# Patient Record
Sex: MALE | Race: WHITE | Employment: FULL TIME | URBAN - METROPOLITAN AREA
[De-identification: names, ages, dates, MRNs, and addresses within clinical notes are randomized per-mention and may not be internally consistent; named-entity substitution may affect disease eponyms.]

---

## 2018-03-30 ENCOUNTER — E-VISIT (OUTPATIENT)
Dept: FAMILY MEDICINE CLINIC | Age: 48
End: 2018-03-30

## 2018-03-30 DIAGNOSIS — J11.1 INFLUENZA: Primary | ICD-10-CM

## 2018-03-30 PROCEDURE — 99444 PR PHYSICIAN ONLINE EVALUATION & MANAGEMENT SERVICE: CPT | Performed by: FAMILY MEDICINE

## 2018-03-30 RX ORDER — OSELTAMIVIR PHOSPHATE 75 MG/1
75 CAPSULE ORAL 2 TIMES DAILY
Qty: 10 CAPSULE | Refills: 0 | Status: SHIPPED | OUTPATIENT
Start: 2018-03-30 | End: 2018-04-04

## 2018-03-30 ASSESSMENT — LIFESTYLE VARIABLES: SMOKING_STATUS: NO

## 2018-04-01 ENCOUNTER — HOSPITAL ENCOUNTER (EMERGENCY)
Age: 48
Discharge: HOME | End: 2018-04-01
Attending: EMERGENCY MEDICINE
Payer: COMMERCIAL

## 2018-04-01 VITALS
TEMPERATURE: 99.3 F | HEIGHT: 76 IN | OXYGEN SATURATION: 99 % | DIASTOLIC BLOOD PRESSURE: 83 MMHG | SYSTOLIC BLOOD PRESSURE: 135 MMHG | WEIGHT: 315 LBS | HEART RATE: 76 BPM | BODY MASS INDEX: 38.36 KG/M2 | RESPIRATION RATE: 16 BRPM

## 2018-04-01 DIAGNOSIS — L03.116 CELLULITIS OF LEFT LOWER EXTREMITY: Primary | ICD-10-CM

## 2018-04-01 PROCEDURE — 99282 EMERGENCY DEPT VISIT SF MDM: CPT

## 2018-04-01 RX ORDER — DOXYCYCLINE HYCLATE 100 MG
100 TABLET ORAL 2 TIMES DAILY
Qty: 20 TABLET | Refills: 0 | Status: SHIPPED | OUTPATIENT
Start: 2018-04-01 | End: 2019-08-30 | Stop reason: SDUPTHER

## 2018-04-01 RX ORDER — CLINDAMYCIN HYDROCHLORIDE 150 MG/1
CAPSULE ORAL
Qty: 80 CAPSULE | Refills: 0 | Status: SHIPPED | OUTPATIENT
Start: 2018-04-01 | End: 2019-08-30 | Stop reason: SDUPTHER

## 2018-04-01 ASSESSMENT — PAIN SCALES - WONG BAKER: WONGBAKER_NUMERICALRESPONSE: 4

## 2018-04-01 ASSESSMENT — PAIN DESCRIPTION - PAIN TYPE: TYPE: ACUTE PAIN

## 2018-04-01 ASSESSMENT — PAIN SCALES - GENERAL: PAINLEVEL_OUTOF10: 4

## 2018-04-01 ASSESSMENT — PAIN DESCRIPTION - DESCRIPTORS: DESCRIPTORS: ACHING

## 2018-04-01 ASSESSMENT — PAIN DESCRIPTION - ORIENTATION: ORIENTATION: LEFT

## 2018-04-02 ENCOUNTER — OFFICE VISIT (OUTPATIENT)
Dept: FAMILY MEDICINE CLINIC | Age: 48
End: 2018-04-02
Payer: COMMERCIAL

## 2018-04-02 VITALS — BODY MASS INDEX: 41.39 KG/M2 | WEIGHT: 315 LBS

## 2018-04-02 DIAGNOSIS — L03.116 CELLULITIS OF LEFT LOWER EXTREMITY: ICD-10-CM

## 2018-04-02 DIAGNOSIS — K12.0 APHTHOUS ULCER: Primary | ICD-10-CM

## 2018-04-02 PROCEDURE — 99213 OFFICE O/P EST LOW 20 MIN: CPT | Performed by: FAMILY MEDICINE

## 2018-04-02 RX ORDER — FLUCONAZOLE 100 MG/1
100 TABLET ORAL DAILY
Qty: 7 TABLET | Refills: 0 | Status: SHIPPED | OUTPATIENT
Start: 2018-04-02 | End: 2018-04-09

## 2018-04-02 RX ORDER — CHLORHEXIDINE GLUCONATE 0.12 MG/ML
RINSE ORAL
Qty: 630 ML | Refills: 0 | Status: SHIPPED | OUTPATIENT
Start: 2018-04-02 | End: 2019-01-10 | Stop reason: ALTCHOICE

## 2018-04-02 ASSESSMENT — PATIENT HEALTH QUESTIONNAIRE - PHQ9
2. FEELING DOWN, DEPRESSED OR HOPELESS: 0
SUM OF ALL RESPONSES TO PHQ9 QUESTIONS 1 & 2: 0
SUM OF ALL RESPONSES TO PHQ QUESTIONS 1-9: 0
1. LITTLE INTEREST OR PLEASURE IN DOING THINGS: 0

## 2018-04-03 RX ORDER — VALACYCLOVIR HYDROCHLORIDE 1 G/1
TABLET, FILM COATED ORAL
Qty: 4 TABLET | Refills: 5 | Status: SHIPPED | OUTPATIENT
Start: 2018-04-03 | End: 2019-08-30 | Stop reason: SDUPTHER

## 2018-04-03 ASSESSMENT — ENCOUNTER SYMPTOMS
STRIDOR: 0
CHOKING: 0
BACK PAIN: 0
RHINORRHEA: 0
DIARRHEA: 0
CHEST TIGHTNESS: 0
SINUS PRESSURE: 0
APNEA: 0
VOICE CHANGE: 0
CONSTIPATION: 0
COUGH: 0
WHEEZING: 0
EYE PAIN: 0
RECTAL PAIN: 0
COLOR CHANGE: 0
NAUSEA: 0
ABDOMINAL PAIN: 0
SHORTNESS OF BREATH: 0
FACIAL SWELLING: 0
VOMITING: 0
EYE REDNESS: 0
PHOTOPHOBIA: 0
EYE ITCHING: 0
BLOOD IN STOOL: 0
ANAL BLEEDING: 0
SORE THROAT: 0
TROUBLE SWALLOWING: 0
ABDOMINAL DISTENTION: 0
EYE DISCHARGE: 0

## 2018-04-03 ASSESSMENT — PATIENT HEALTH QUESTIONNAIRE - PHQ9
SUM OF ALL RESPONSES TO PHQ QUESTIONS 1-9: 0
SUM OF ALL RESPONSES TO PHQ QUESTIONS 1-9: 0
SUM OF ALL RESPONSES TO PHQ9 QUESTIONS 1 & 2: 0
2. FEELING DOWN, DEPRESSED OR HOPELESS: 0
1. LITTLE INTEREST OR PLEASURE IN DOING THINGS: 0
2. FEELING DOWN, DEPRESSED OR HOPELESS: 0
SUM OF ALL RESPONSES TO PHQ9 QUESTIONS 1 & 2: 0
1. LITTLE INTEREST OR PLEASURE IN DOING THINGS: 0

## 2018-04-17 ENCOUNTER — OFFICE VISIT (OUTPATIENT)
Dept: FAMILY MEDICINE CLINIC | Age: 48
End: 2018-04-17
Payer: COMMERCIAL

## 2018-04-17 VITALS — SYSTOLIC BLOOD PRESSURE: 128 MMHG | DIASTOLIC BLOOD PRESSURE: 80 MMHG | WEIGHT: 315 LBS | BODY MASS INDEX: 41.4 KG/M2

## 2018-04-17 DIAGNOSIS — F98.8 ATTENTION DEFICIT DISORDER, UNSPECIFIED HYPERACTIVITY PRESENCE: ICD-10-CM

## 2018-04-17 DIAGNOSIS — M51.16 LUMBAR DISC DISEASE WITH RADICULOPATHY: ICD-10-CM

## 2018-04-17 PROCEDURE — 99213 OFFICE O/P EST LOW 20 MIN: CPT | Performed by: FAMILY MEDICINE

## 2018-04-17 RX ORDER — GABAPENTIN 600 MG/1
600 TABLET ORAL 3 TIMES DAILY
Qty: 180 TABLET | Refills: 3 | Status: SHIPPED | OUTPATIENT
Start: 2018-04-17 | End: 2019-03-28 | Stop reason: SDUPTHER

## 2018-04-17 ASSESSMENT — ENCOUNTER SYMPTOMS
CONSTIPATION: 0
EYE ITCHING: 0
ANAL BLEEDING: 0
SORE THROAT: 0
EYE DISCHARGE: 0
EYE PAIN: 0
PHOTOPHOBIA: 0
APNEA: 0
CHOKING: 0
EYE REDNESS: 0
STRIDOR: 0
WHEEZING: 0
CHEST TIGHTNESS: 0
ABDOMINAL DISTENTION: 0
VOMITING: 0
ABDOMINAL PAIN: 0
VOICE CHANGE: 0
BLOOD IN STOOL: 0
RECTAL PAIN: 0
TROUBLE SWALLOWING: 0
FACIAL SWELLING: 0
BACK PAIN: 0
COUGH: 0
NAUSEA: 0
COLOR CHANGE: 0
SHORTNESS OF BREATH: 0

## 2018-04-17 ASSESSMENT — PATIENT HEALTH QUESTIONNAIRE - PHQ9
2. FEELING DOWN, DEPRESSED OR HOPELESS: 0
1. LITTLE INTEREST OR PLEASURE IN DOING THINGS: 0
SUM OF ALL RESPONSES TO PHQ QUESTIONS 1-9: 0
SUM OF ALL RESPONSES TO PHQ9 QUESTIONS 1 & 2: 0

## 2018-05-10 DIAGNOSIS — I87.2 STASIS DERMATITIS OF BOTH LEGS: ICD-10-CM

## 2018-05-10 DIAGNOSIS — I87.2 VENOUS INSUFFICIENCY (CHRONIC) (PERIPHERAL): ICD-10-CM

## 2018-05-15 ENCOUNTER — TELEPHONE (OUTPATIENT)
Dept: FAMILY MEDICINE CLINIC | Age: 48
End: 2018-05-15

## 2018-05-15 DIAGNOSIS — E05.90 HYPERTHYROIDISM: Primary | ICD-10-CM

## 2018-05-15 DIAGNOSIS — R00.1 BRADYCARDIA: ICD-10-CM

## 2018-06-15 DIAGNOSIS — G25.0 ESSENTIAL TREMOR: ICD-10-CM

## 2018-06-18 ENCOUNTER — OFFICE VISIT (OUTPATIENT)
Dept: FAMILY MEDICINE CLINIC | Age: 48
End: 2018-06-18
Payer: COMMERCIAL

## 2018-06-18 VITALS
WEIGHT: 315 LBS | BODY MASS INDEX: 41.8 KG/M2 | SYSTOLIC BLOOD PRESSURE: 118 MMHG | HEART RATE: 62 BPM | DIASTOLIC BLOOD PRESSURE: 80 MMHG | OXYGEN SATURATION: 98 %

## 2018-06-18 DIAGNOSIS — I10 BENIGN ESSENTIAL HTN: ICD-10-CM

## 2018-06-18 DIAGNOSIS — L20.84 INTRINSIC ECZEMA: Primary | ICD-10-CM

## 2018-06-18 DIAGNOSIS — F98.8 ATTENTION DEFICIT DISORDER, UNSPECIFIED HYPERACTIVITY PRESENCE: ICD-10-CM

## 2018-06-18 DIAGNOSIS — K21.00 GASTROESOPHAGEAL REFLUX DISEASE WITH ESOPHAGITIS: ICD-10-CM

## 2018-06-18 DIAGNOSIS — F51.01 PRIMARY INSOMNIA: ICD-10-CM

## 2018-06-18 PROCEDURE — 99214 OFFICE O/P EST MOD 30 MIN: CPT | Performed by: FAMILY MEDICINE

## 2018-06-18 RX ORDER — TRAZODONE HYDROCHLORIDE 50 MG/1
100 TABLET ORAL NIGHTLY
Qty: 60 TABLET | Refills: 5 | Status: SHIPPED | OUTPATIENT
Start: 2018-06-18 | End: 2018-12-13 | Stop reason: SDUPTHER

## 2018-06-18 RX ORDER — RANITIDINE 300 MG/1
300 TABLET ORAL NIGHTLY
Qty: 30 TABLET | Refills: 5 | Status: SHIPPED | OUTPATIENT
Start: 2018-06-18 | End: 2018-12-13 | Stop reason: SDUPTHER

## 2018-06-18 RX ORDER — LISINOPRIL 40 MG/1
40 TABLET ORAL DAILY
Qty: 90 TABLET | Refills: 4 | Status: SHIPPED | OUTPATIENT
Start: 2018-06-18 | End: 2019-01-10 | Stop reason: SDUPTHER

## 2018-06-18 RX ORDER — CLOBETASOL PROPIONATE 0.5 MG/ML
LOTION TOPICAL
Qty: 118 ML | Refills: 0 | Status: SHIPPED | OUTPATIENT
Start: 2018-06-18 | End: 2019-06-04 | Stop reason: SDUPTHER

## 2018-06-18 RX ORDER — ESOMEPRAZOLE MAGNESIUM 40 MG/1
40 CAPSULE, DELAYED RELEASE ORAL DAILY
Qty: 90 CAPSULE | Refills: 4 | Status: SHIPPED | OUTPATIENT
Start: 2018-06-18 | End: 2019-06-04

## 2018-06-18 RX ORDER — PROPRANOLOL HYDROCHLORIDE 120 MG/1
120 CAPSULE, EXTENDED RELEASE ORAL NIGHTLY
Qty: 30 CAPSULE | Refills: 5 | Status: SHIPPED | OUTPATIENT
Start: 2018-06-18 | End: 2019-01-10 | Stop reason: DRUGHIGH

## 2018-06-18 RX ORDER — DEXAMETHASONE 4 MG/1
4 TABLET ORAL 2 TIMES DAILY WITH MEALS
Qty: 20 TABLET | Refills: 0 | Status: SHIPPED | OUTPATIENT
Start: 2018-06-18 | End: 2018-06-28

## 2018-06-18 ASSESSMENT — ENCOUNTER SYMPTOMS
EYE ITCHING: 0
VOICE CHANGE: 0
RHINORRHEA: 1
WHEEZING: 0
CHEST TIGHTNESS: 0
ABDOMINAL DISTENTION: 0
COLOR CHANGE: 0
SINUS PAIN: 1
VOMITING: 0
EYE REDNESS: 0
TROUBLE SWALLOWING: 0
NAUSEA: 0
HEARTBURN: 1
CONSTIPATION: 0
EYE PAIN: 0
CHOKING: 0
RECTAL PAIN: 0
APNEA: 0
EYE DISCHARGE: 0
BACK PAIN: 0
ANAL BLEEDING: 0
FACIAL SWELLING: 0
ABDOMINAL PAIN: 0
DIARRHEA: 0
STRIDOR: 0
COUGH: 0
PHOTOPHOBIA: 0
SORE THROAT: 0
SHORTNESS OF BREATH: 0
BLOOD IN STOOL: 0
SINUS PRESSURE: 0

## 2018-09-10 DIAGNOSIS — M51.16 LUMBAR DISC DISEASE WITH RADICULOPATHY: ICD-10-CM

## 2018-09-10 RX ORDER — CARISOPRODOL 350 MG/1
TABLET ORAL
Qty: 320 TABLET | Refills: 5 | Status: SHIPPED | OUTPATIENT
Start: 2018-09-10 | End: 2018-11-19 | Stop reason: SDUPTHER

## 2018-11-19 DIAGNOSIS — M51.16 LUMBAR DISC DISEASE WITH RADICULOPATHY: ICD-10-CM

## 2018-11-19 RX ORDER — CARISOPRODOL 350 MG/1
TABLET ORAL
Qty: 320 TABLET | Refills: 5 | Status: SHIPPED | OUTPATIENT
Start: 2018-11-19 | End: 2019-02-26 | Stop reason: SDUPTHER

## 2018-11-19 NOTE — TELEPHONE ENCOUNTER
Patient last office visit 6/18/2018  Last medication refill 9/10/2018 with 5 refills  Order Pended  No future appointment schedule

## 2018-12-13 DIAGNOSIS — F51.01 PRIMARY INSOMNIA: ICD-10-CM

## 2018-12-13 DIAGNOSIS — K21.00 GASTROESOPHAGEAL REFLUX DISEASE WITH ESOPHAGITIS: ICD-10-CM

## 2018-12-13 RX ORDER — TRAZODONE HYDROCHLORIDE 50 MG/1
TABLET ORAL
Qty: 60 TABLET | Refills: 5 | Status: SHIPPED | OUTPATIENT
Start: 2018-12-13 | End: 2019-10-21 | Stop reason: SDUPTHER

## 2018-12-13 RX ORDER — RANITIDINE 300 MG/1
TABLET ORAL
Qty: 30 TABLET | Refills: 5 | Status: SHIPPED | OUTPATIENT
Start: 2018-12-13 | End: 2019-02-26 | Stop reason: SDUPTHER

## 2019-01-10 ENCOUNTER — OFFICE VISIT (OUTPATIENT)
Dept: FAMILY MEDICINE CLINIC | Age: 49
End: 2019-01-10
Payer: COMMERCIAL

## 2019-01-10 VITALS
WEIGHT: 315 LBS | HEART RATE: 60 BPM | BODY MASS INDEX: 38.36 KG/M2 | DIASTOLIC BLOOD PRESSURE: 84 MMHG | RESPIRATION RATE: 18 BRPM | HEIGHT: 76 IN | SYSTOLIC BLOOD PRESSURE: 126 MMHG | OXYGEN SATURATION: 97 %

## 2019-01-10 DIAGNOSIS — Z23 NEED FOR IMMUNIZATION AGAINST INFLUENZA: Primary | ICD-10-CM

## 2019-01-10 DIAGNOSIS — I10 BENIGN ESSENTIAL HTN: ICD-10-CM

## 2019-01-10 DIAGNOSIS — M77.12 LATERAL EPICONDYLITIS OF LEFT ELBOW: ICD-10-CM

## 2019-01-10 DIAGNOSIS — J01.00 ACUTE NON-RECURRENT MAXILLARY SINUSITIS: ICD-10-CM

## 2019-01-10 PROCEDURE — 99213 OFFICE O/P EST LOW 20 MIN: CPT | Performed by: FAMILY MEDICINE

## 2019-01-10 PROCEDURE — 90686 IIV4 VACC NO PRSV 0.5 ML IM: CPT | Performed by: FAMILY MEDICINE

## 2019-01-10 PROCEDURE — 90471 IMMUNIZATION ADMIN: CPT | Performed by: FAMILY MEDICINE

## 2019-01-10 RX ORDER — DEXAMETHASONE 4 MG/1
4 TABLET ORAL 2 TIMES DAILY WITH MEALS
Qty: 20 TABLET | Refills: 0 | Status: SHIPPED | OUTPATIENT
Start: 2019-01-10 | End: 2019-01-20

## 2019-01-10 RX ORDER — INDOMETHACIN 50 MG/1
50 CAPSULE ORAL 2 TIMES DAILY WITH MEALS
Qty: 20 CAPSULE | Refills: 0 | Status: SHIPPED | OUTPATIENT
Start: 2019-01-10 | End: 2019-10-15

## 2019-01-10 RX ORDER — LISINOPRIL 40 MG/1
40 TABLET ORAL DAILY
Qty: 90 TABLET | Refills: 4 | Status: SHIPPED | OUTPATIENT
Start: 2019-01-10 | End: 2020-01-10

## 2019-01-10 RX ORDER — PROPRANOLOL HYDROCHLORIDE 80 MG/1
80 TABLET ORAL DAILY
Qty: 90 TABLET | Refills: 2 | Status: SHIPPED | OUTPATIENT
Start: 2019-01-10 | End: 2019-10-30

## 2019-01-10 RX ORDER — PROPRANOLOL HYDROCHLORIDE 80 MG/1
80 TABLET ORAL 3 TIMES DAILY
COMMUNITY
End: 2019-01-10 | Stop reason: SDUPTHER

## 2019-01-10 ASSESSMENT — PATIENT HEALTH QUESTIONNAIRE - PHQ9
SUM OF ALL RESPONSES TO PHQ QUESTIONS 1-9: 0
2. FEELING DOWN, DEPRESSED OR HOPELESS: 0
1. LITTLE INTEREST OR PLEASURE IN DOING THINGS: 0
SUM OF ALL RESPONSES TO PHQ QUESTIONS 1-9: 0
SUM OF ALL RESPONSES TO PHQ9 QUESTIONS 1 & 2: 0

## 2019-01-12 ASSESSMENT — ENCOUNTER SYMPTOMS
COUGH: 1
SINUS PRESSURE: 1
RHINORRHEA: 1
SINUS PAIN: 1

## 2019-02-26 DIAGNOSIS — K21.00 GASTROESOPHAGEAL REFLUX DISEASE WITH ESOPHAGITIS: ICD-10-CM

## 2019-02-26 DIAGNOSIS — M51.16 LUMBAR DISC DISEASE WITH RADICULOPATHY: ICD-10-CM

## 2019-02-28 RX ORDER — CARISOPRODOL 350 MG/1
TABLET ORAL
Qty: 320 TABLET | Refills: 5 | Status: SHIPPED | OUTPATIENT
Start: 2019-02-28 | End: 2019-07-11 | Stop reason: SDUPTHER

## 2019-02-28 RX ORDER — RANITIDINE 300 MG/1
300 TABLET ORAL DAILY
Qty: 90 TABLET | Refills: 1 | Status: SHIPPED | OUTPATIENT
Start: 2019-02-28 | End: 2019-07-12 | Stop reason: SDUPTHER

## 2019-03-28 DIAGNOSIS — M51.16 LUMBAR DISC DISEASE WITH RADICULOPATHY: ICD-10-CM

## 2019-03-28 RX ORDER — GABAPENTIN 600 MG/1
600 TABLET ORAL 3 TIMES DAILY
Qty: 180 TABLET | Refills: 3 | Status: SHIPPED | OUTPATIENT
Start: 2019-03-28 | End: 2019-10-16 | Stop reason: SDUPTHER

## 2019-04-03 ENCOUNTER — E-VISIT (OUTPATIENT)
Dept: FAMILY MEDICINE CLINIC | Age: 49
End: 2019-04-03
Payer: COMMERCIAL

## 2019-04-03 DIAGNOSIS — J06.9 VIRAL URI: Primary | ICD-10-CM

## 2019-04-03 PROCEDURE — 98969 PR NONPHYSICIAN ONLINE ASSESSMENT AND MANAGEMENT: CPT | Performed by: NURSE PRACTITIONER

## 2019-04-03 ASSESSMENT — LIFESTYLE VARIABLES: SMOKING_STATUS: NO, I'VE NEVER SMOKED

## 2019-04-05 ENCOUNTER — E-VISIT (OUTPATIENT)
Dept: FAMILY MEDICINE CLINIC | Age: 49
End: 2019-04-05
Payer: COMMERCIAL

## 2019-04-05 PROCEDURE — 99212 OFFICE O/P EST SF 10 MIN: CPT | Performed by: FAMILY MEDICINE

## 2019-04-05 RX ORDER — AZITHROMYCIN 250 MG/1
250 TABLET, FILM COATED ORAL SEE ADMIN INSTRUCTIONS
Qty: 6 TABLET | Refills: 0 | Status: SHIPPED | OUTPATIENT
Start: 2019-04-05 | End: 2019-04-10

## 2019-04-05 ASSESSMENT — LIFESTYLE VARIABLES: SMOKING_STATUS: NO, I'VE NEVER SMOKED

## 2019-06-04 ENCOUNTER — OFFICE VISIT (OUTPATIENT)
Dept: FAMILY MEDICINE CLINIC | Age: 49
End: 2019-06-04
Payer: COMMERCIAL

## 2019-06-04 VITALS
HEART RATE: 49 BPM | OXYGEN SATURATION: 97 % | RESPIRATION RATE: 18 BRPM | HEIGHT: 76 IN | SYSTOLIC BLOOD PRESSURE: 124 MMHG | BODY MASS INDEX: 38.36 KG/M2 | WEIGHT: 315 LBS | DIASTOLIC BLOOD PRESSURE: 84 MMHG

## 2019-06-04 DIAGNOSIS — L20.84 INTRINSIC ECZEMA: ICD-10-CM

## 2019-06-04 DIAGNOSIS — K21.9 GASTROESOPHAGEAL REFLUX DISEASE WITHOUT ESOPHAGITIS: Primary | ICD-10-CM

## 2019-06-04 PROCEDURE — 99214 OFFICE O/P EST MOD 30 MIN: CPT | Performed by: FAMILY MEDICINE

## 2019-06-04 RX ORDER — CLOBETASOL PROPIONATE 0.5 MG/ML
LOTION TOPICAL
Qty: 118 ML | Refills: 5 | Status: SHIPPED | OUTPATIENT
Start: 2019-06-04 | End: 2020-01-21

## 2019-06-04 RX ORDER — DEXLANSOPRAZOLE 60 MG/1
60 CAPSULE, DELAYED RELEASE ORAL DAILY
Qty: 30 CAPSULE | Refills: 5 | Status: SHIPPED | OUTPATIENT
Start: 2019-06-04 | End: 2019-07-11 | Stop reason: SDUPTHER

## 2019-06-04 RX ORDER — PREDNISONE 1 MG/1
5 TABLET ORAL DAILY
Qty: 10 TABLET | Refills: 0 | Status: SHIPPED
Start: 2019-06-04 | End: 2020-08-28 | Stop reason: SDUPTHER

## 2019-06-04 ASSESSMENT — ENCOUNTER SYMPTOMS
STRIDOR: 0
CHEST TIGHTNESS: 0
FACIAL SWELLING: 0
NAUSEA: 0
BACK PAIN: 1
RHINORRHEA: 0
ANAL BLEEDING: 0
DIARRHEA: 0
ABDOMINAL DISTENTION: 0
SHORTNESS OF BREATH: 0
BLOOD IN STOOL: 0
TROUBLE SWALLOWING: 0
CONSTIPATION: 0
WHEEZING: 0
APNEA: 0
COLOR CHANGE: 0
ABDOMINAL PAIN: 0
COUGH: 0
CHOKING: 0
SINUS PRESSURE: 0
RECTAL PAIN: 0
VOMITING: 0
SORE THROAT: 0
VOICE CHANGE: 0
HEARTBURN: 1

## 2019-06-04 ASSESSMENT — PATIENT HEALTH QUESTIONNAIRE - PHQ9
1. LITTLE INTEREST OR PLEASURE IN DOING THINGS: 0
SUM OF ALL RESPONSES TO PHQ QUESTIONS 1-9: 0
2. FEELING DOWN, DEPRESSED OR HOPELESS: 0
SUM OF ALL RESPONSES TO PHQ QUESTIONS 1-9: 0
SUM OF ALL RESPONSES TO PHQ9 QUESTIONS 1 & 2: 0

## 2019-06-05 NOTE — PROGRESS NOTES
Carisa Mcfarland is a 51 y.o. male  . Subjective:      Gastroesophageal Reflux   He complains of heartburn. He reports no abdominal pain, no chest pain, no choking, no coughing, no nausea, no sore throat or no wheezing. This is a chronic problem. The current episode started more than 1 year ago. The problem occurs frequently. The problem has been gradually worsening. The heartburn duration is less than a minute. The heartburn is located in the substernum. The heartburn is of moderate intensity. The heartburn does not wake him from sleep. The heartburn does not limit his activity. The heartburn doesn't change with position. The symptoms are aggravated by certain foods. Associated symptoms include fatigue. He has tried a PPI for the symptoms. The treatment provided moderate relief. Rash   This is a chronic problem. The current episode started more than 1 year ago. The problem has been gradually worsening since onset. The affected locations include the left upper leg, left lower leg and right arm. The rash is characterized by scaling, itchiness and dryness. Associated symptoms include fatigue. Pertinent negatives include no congestion, cough, diarrhea, fever, rhinorrhea, shortness of breath, sore throat or vomiting. Past treatments include nothing. The treatment provided no relief. Review of Systems   Constitutional: Positive for fatigue. Negative for activity change, appetite change, chills, diaphoresis, fever and unexpected weight change. HENT: Negative for congestion, dental problem, ear discharge, ear pain, facial swelling, hearing loss, mouth sores, nosebleeds, postnasal drip, rhinorrhea, sinus pressure, sneezing, sore throat, tinnitus, trouble swallowing and voice change. Respiratory: Negative for apnea, cough, choking, chest tightness, shortness of breath, wheezing and stridor. Cardiovascular: Negative for chest pain, palpitations and leg swelling. Gastrointestinal: Positive for heartburn. Negative for abdominal distention, abdominal pain, anal bleeding, blood in stool, constipation, diarrhea, nausea, rectal pain and vomiting. Endocrine: Negative for cold intolerance, heat intolerance, polydipsia, polyphagia and polyuria. Musculoskeletal: Positive for arthralgias and back pain. Skin: Positive for rash. Negative for color change, pallor and wound. Psychiatric/Behavioral: Negative for dysphoric mood, self-injury, sleep disturbance and suicidal ideas. The patient is not nervous/anxious.         Past Medical History:   Diagnosis Date    Colitis, ulcerative chronic (HCC)     GERD (gastroesophageal reflux disease)     Hypertension        Social History     Socioeconomic History    Marital status:      Spouse name: Not on file    Number of children: Not on file    Years of education: Not on file    Highest education level: Not on file   Occupational History    Not on file   Social Needs    Financial resource strain: Not on file    Food insecurity:     Worry: Not on file     Inability: Not on file    Transportation needs:     Medical: Not on file     Non-medical: Not on file   Tobacco Use    Smoking status: Never Smoker    Smokeless tobacco: Never Used   Substance and Sexual Activity    Alcohol use: No     Comment: occasional     Drug use: No    Sexual activity: Yes     Partners: Female   Lifestyle    Physical activity:     Days per week: Not on file     Minutes per session: Not on file    Stress: Not on file   Relationships    Social connections:     Talks on phone: Not on file     Gets together: Not on file     Attends Advent service: Not on file     Active member of club or organization: Not on file     Attends meetings of clubs or organizations: Not on file     Relationship status: Not on file    Intimate partner violence:     Fear of current or ex partner: Not on file     Emotionally abused: Not on file     Physically abused: Not on file     Forced sexual activity: Not on file   Other Topics Concern    Not on file   Social History Narrative    Not on file       Family History   Problem Relation Age of Onset    No Known Problems Mother     No Known Problems Father        Current Outpatient Medications on File Prior to Visit   Medication Sig Dispense Refill    gabapentin (NEURONTIN) 600 MG tablet Take 1 tablet by mouth 3 times daily. 180 tablet 3    carisoprodol (SOMA) 350 MG tablet take 1 tablet by mouth four times a day if needed for muscle spasm. 320 tablet 5    budesonide (RHINOCORT AQUA) 32 MCG/ACT nasal spray 2 sprays by Nasal route daily 1 Bottle 0    lisinopril (ZESTRIL) 40 MG tablet Take 1 tablet by mouth daily 90 tablet 4    propranolol (INDERAL) 80 MG tablet Take 1 tablet by mouth daily 90 tablet 2    traZODone (DESYREL) 50 MG tablet take 2 tablets by mouth at bedtime 60 tablet 5    levothyroxine (SYNTHROID) 200 MCG tablet TAKE 1 TABLET BY MOUTH  DAILY 90 tablet 5    valACYclovir (VALTREX) 1 g tablet Two tablets twice a day for one day 4 tablet 5    ibuprofen (ADVIL;MOTRIN) 800 MG tablet Take 1 tablet by mouth  every 6 hours as needed for pain 360 tablet 5    ranitidine (ZANTAC) 300 MG tablet Take 1 tablet by mouth daily 90 tablet 1    indomethacin (INDOCIN) 50 MG capsule Take 1 capsule by mouth 2 times daily (with meals) for 10 days Take with food. 20 capsule 0     No current facility-administered medications on file prior to visit. Allergies   Allergen Reactions    Penicillins        I have reviewed his allergies, medications, problem list, medical,social and family history and have updated as needed in the electronic medical record. Objective:     Physical Exam   Constitutional: He appears well-developed and well-nourished. No distress. HENT:   Head: Normocephalic and atraumatic. Right Ear: External ear normal.   Left Ear: External ear normal.   Nose: Nose normal.   Mouth/Throat: Oropharynx is clear and moist. No oropharyngeal exudate. Cardiovascular: Normal rate, regular rhythm and intact distal pulses. Exam reveals no gallop and no friction rub. No murmur heard. Pulmonary/Chest: Effort normal and breath sounds normal. No respiratory distress. He has no wheezes. He has no rales. He exhibits no tenderness. Genitourinary:   Genitourinary Comments: Deferred by patient   Musculoskeletal:   Increased spasm L1 to S1 with decreased ROM. + straight leg raising and contralateral straight leg raising tests B/L   Skin: He is not diaphoretic. Plaque psoriasis worsening . Now on arms as well        Assessment / Plan:   Ronnie Alas was seen today for eczema. Diagnoses and all orders for this visit:    Gastroesophageal reflux disease without esophagitis  -     dexlansoprazole (DEXILANT) 60 MG CPDR delayed release capsule; Take 1 capsule by mouth daily    Intrinsic eczema  -     clobetasol propionate 0.05 % LOTN lotion; Apply twice a day  -     predniSONE (DELTASONE) 5 MG tablet; Take 1 tablet by mouth daily for 10 days        Reviewed healthmaintenance report. Patient is aware of deficiencies and suggested preventative tests.

## 2019-07-11 ENCOUNTER — HOSPITAL ENCOUNTER (OUTPATIENT)
Age: 49
Discharge: HOME | End: 2019-07-13
Payer: COMMERCIAL

## 2019-07-11 ENCOUNTER — OFFICE VISIT (OUTPATIENT)
Dept: FAMILY MEDICINE CLINIC | Age: 49
End: 2019-07-11
Payer: COMMERCIAL

## 2019-07-11 VITALS
OXYGEN SATURATION: 96 % | WEIGHT: 315 LBS | HEART RATE: 54 BPM | BODY MASS INDEX: 38.36 KG/M2 | HEIGHT: 76 IN | DIASTOLIC BLOOD PRESSURE: 80 MMHG | RESPIRATION RATE: 18 BRPM | SYSTOLIC BLOOD PRESSURE: 126 MMHG

## 2019-07-11 DIAGNOSIS — K21.9 GASTROESOPHAGEAL REFLUX DISEASE WITHOUT ESOPHAGITIS: ICD-10-CM

## 2019-07-11 DIAGNOSIS — I10 BENIGN ESSENTIAL HTN: Primary | ICD-10-CM

## 2019-07-11 DIAGNOSIS — M51.16 LUMBAR DISC DISEASE WITH RADICULOPATHY: ICD-10-CM

## 2019-07-11 DIAGNOSIS — I10 BENIGN ESSENTIAL HTN: ICD-10-CM

## 2019-07-11 DIAGNOSIS — L20.84 INTRINSIC ECZEMA: ICD-10-CM

## 2019-07-11 DIAGNOSIS — R53.83 FATIGUE, UNSPECIFIED TYPE: ICD-10-CM

## 2019-07-11 LAB
ALBUMIN SERPL-MCNC: 4.8 G/DL (ref 3.5–5.2)
ALP BLD-CCNC: 85 U/L (ref 40–129)
ALT SERPL-CCNC: 26 U/L (ref 0–40)
ANION GAP SERPL CALCULATED.3IONS-SCNC: 12 MMOL/L (ref 7–16)
AST SERPL-CCNC: 23 U/L (ref 0–39)
BASOPHILS ABSOLUTE: 0.05 E9/L (ref 0–0.2)
BASOPHILS RELATIVE PERCENT: 0.6 % (ref 0–2)
BILIRUB SERPL-MCNC: 0.2 MG/DL (ref 0–1.2)
BUN BLDV-MCNC: 14 MG/DL (ref 6–20)
CALCIUM SERPL-MCNC: 10 MG/DL (ref 8.6–10.2)
CHLORIDE BLD-SCNC: 105 MMOL/L (ref 98–107)
CHOLESTEROL, TOTAL: 214 MG/DL (ref 0–199)
CO2: 29 MMOL/L (ref 22–29)
CREAT SERPL-MCNC: 1.1 MG/DL (ref 0.7–1.2)
EOSINOPHILS ABSOLUTE: 0.28 E9/L (ref 0.05–0.5)
EOSINOPHILS RELATIVE PERCENT: 3.2 % (ref 0–6)
GFR AFRICAN AMERICAN: >60
GFR NON-AFRICAN AMERICAN: >60 ML/MIN/1.73
GLUCOSE BLD-MCNC: 88 MG/DL (ref 74–99)
HBA1C MFR BLD: 5.4 % (ref 4–5.6)
HCT VFR BLD CALC: 48.4 % (ref 37–54)
HDLC SERPL-MCNC: 39 MG/DL
HEMOGLOBIN: 15.6 G/DL (ref 12.5–16.5)
IMMATURE GRANULOCYTES #: 0.04 E9/L
IMMATURE GRANULOCYTES %: 0.5 % (ref 0–5)
LDL CHOLESTEROL CALCULATED: 146 MG/DL (ref 0–99)
LYMPHOCYTES ABSOLUTE: 2.21 E9/L (ref 1.5–4)
LYMPHOCYTES RELATIVE PERCENT: 25.3 % (ref 20–42)
MCH RBC QN AUTO: 28.6 PG (ref 26–35)
MCHC RBC AUTO-ENTMCNC: 32.2 % (ref 32–34.5)
MCV RBC AUTO: 88.6 FL (ref 80–99.9)
MONOCYTES ABSOLUTE: 1.03 E9/L (ref 0.1–0.95)
MONOCYTES RELATIVE PERCENT: 11.8 % (ref 2–12)
NEUTROPHILS ABSOLUTE: 5.12 E9/L (ref 1.8–7.3)
NEUTROPHILS RELATIVE PERCENT: 58.6 % (ref 43–80)
PDW BLD-RTO: 14 FL (ref 11.5–15)
PLATELET # BLD: 299 E9/L (ref 130–450)
PMV BLD AUTO: 10.6 FL (ref 7–12)
POTASSIUM SERPL-SCNC: 5 MMOL/L (ref 3.5–5)
RBC # BLD: 5.46 E12/L (ref 3.8–5.8)
SODIUM BLD-SCNC: 146 MMOL/L (ref 132–146)
T4 FREE: 1.42 NG/DL (ref 0.93–1.7)
TOTAL PROTEIN: 7.8 G/DL (ref 6.4–8.3)
TRIGL SERPL-MCNC: 147 MG/DL (ref 0–149)
TSH SERPL DL<=0.05 MIU/L-ACNC: 0.45 UIU/ML (ref 0.27–4.2)
VLDLC SERPL CALC-MCNC: 29 MG/DL
WBC # BLD: 8.7 E9/L (ref 4.5–11.5)

## 2019-07-11 PROCEDURE — 36415 COLL VENOUS BLD VENIPUNCTURE: CPT

## 2019-07-11 PROCEDURE — 83036 HEMOGLOBIN GLYCOSYLATED A1C: CPT

## 2019-07-11 PROCEDURE — 80061 LIPID PANEL: CPT

## 2019-07-11 PROCEDURE — 80053 COMPREHEN METABOLIC PANEL: CPT

## 2019-07-11 PROCEDURE — 84439 ASSAY OF FREE THYROXINE: CPT

## 2019-07-11 PROCEDURE — 99213 OFFICE O/P EST LOW 20 MIN: CPT | Performed by: FAMILY MEDICINE

## 2019-07-11 PROCEDURE — 85025 COMPLETE CBC W/AUTO DIFF WBC: CPT

## 2019-07-11 PROCEDURE — 84443 ASSAY THYROID STIM HORMONE: CPT

## 2019-07-11 RX ORDER — PREDNISONE 10 MG/1
10 TABLET ORAL DAILY
Qty: 14 TABLET | Refills: 0 | Status: SHIPPED | OUTPATIENT
Start: 2019-07-11 | End: 2019-07-25

## 2019-07-11 RX ORDER — DEXLANSOPRAZOLE 60 MG/1
60 CAPSULE, DELAYED RELEASE ORAL DAILY
Qty: 90 CAPSULE | Refills: 5 | Status: SHIPPED | OUTPATIENT
Start: 2019-07-11 | End: 2019-09-13

## 2019-07-11 RX ORDER — CARISOPRODOL 350 MG/1
TABLET ORAL
Qty: 120 TABLET | Refills: 5 | Status: SHIPPED | OUTPATIENT
Start: 2019-07-11 | End: 2020-01-21

## 2019-07-11 ASSESSMENT — PATIENT HEALTH QUESTIONNAIRE - PHQ9
SUM OF ALL RESPONSES TO PHQ QUESTIONS 1-9: 0
1. LITTLE INTEREST OR PLEASURE IN DOING THINGS: 0
SUM OF ALL RESPONSES TO PHQ9 QUESTIONS 1 & 2: 0
2. FEELING DOWN, DEPRESSED OR HOPELESS: 0
SUM OF ALL RESPONSES TO PHQ QUESTIONS 1-9: 0

## 2019-07-12 DIAGNOSIS — K21.00 GASTROESOPHAGEAL REFLUX DISEASE WITH ESOPHAGITIS: ICD-10-CM

## 2019-07-14 ASSESSMENT — ENCOUNTER SYMPTOMS
SHORTNESS OF BREATH: 0
CHEST TIGHTNESS: 0
COUGH: 0
STRIDOR: 0
NAUSEA: 0
WHEEZING: 0
RHINORRHEA: 0
FACIAL SWELLING: 0
DIARRHEA: 0
TROUBLE SWALLOWING: 0
BACK PAIN: 1
ABDOMINAL DISTENTION: 0
ABDOMINAL PAIN: 0
ANAL BLEEDING: 0
HEARTBURN: 1
COLOR CHANGE: 0
APNEA: 0
CONSTIPATION: 0
SINUS PRESSURE: 0
VOICE CHANGE: 0
RECTAL PAIN: 0
VOMITING: 0
BLOOD IN STOOL: 0
CHOKING: 0
SORE THROAT: 0

## 2019-07-15 ENCOUNTER — TELEPHONE (OUTPATIENT)
Dept: FAMILY MEDICINE CLINIC | Age: 49
End: 2019-07-15

## 2019-07-15 RX ORDER — PANTOPRAZOLE SODIUM 40 MG/1
40 TABLET, DELAYED RELEASE ORAL 2 TIMES DAILY
Qty: 60 TABLET | Refills: 5 | Status: SHIPPED | OUTPATIENT
Start: 2019-07-15 | End: 2020-01-24 | Stop reason: SDUPTHER

## 2019-07-15 RX ORDER — RANITIDINE 300 MG/1
300 TABLET ORAL DAILY
Qty: 90 TABLET | Refills: 1 | Status: SHIPPED | OUTPATIENT
Start: 2019-07-15 | End: 2019-10-15

## 2019-07-21 DIAGNOSIS — E03.9 ACQUIRED HYPOTHYROIDISM: ICD-10-CM

## 2019-07-22 RX ORDER — LEVOTHYROXINE SODIUM 0.2 MG/1
200 TABLET ORAL DAILY
Qty: 90 TABLET | Refills: 5 | Status: SHIPPED
Start: 2019-07-22 | End: 2020-08-11

## 2019-08-30 ENCOUNTER — TELEPHONE (OUTPATIENT)
Dept: FAMILY MEDICINE CLINIC | Age: 49
End: 2019-08-30

## 2019-08-30 ENCOUNTER — OFFICE VISIT (OUTPATIENT)
Dept: FAMILY MEDICINE CLINIC | Age: 49
End: 2019-08-30
Payer: COMMERCIAL

## 2019-08-30 VITALS
SYSTOLIC BLOOD PRESSURE: 124 MMHG | HEART RATE: 64 BPM | BODY MASS INDEX: 39.17 KG/M2 | DIASTOLIC BLOOD PRESSURE: 66 MMHG | WEIGHT: 315 LBS | HEIGHT: 75 IN | OXYGEN SATURATION: 98 %

## 2019-08-30 DIAGNOSIS — B00.9 HERPES SIMPLEX: ICD-10-CM

## 2019-08-30 DIAGNOSIS — B37.0 ORAL THRUSH: ICD-10-CM

## 2019-08-30 DIAGNOSIS — M79.662 PAIN AND SWELLING OF LEFT LOWER LEG: ICD-10-CM

## 2019-08-30 DIAGNOSIS — L03.116 CELLULITIS OF LEFT LOWER EXTREMITY: Primary | ICD-10-CM

## 2019-08-30 DIAGNOSIS — M79.89 PAIN AND SWELLING OF LEFT LOWER LEG: ICD-10-CM

## 2019-08-30 PROCEDURE — 99214 OFFICE O/P EST MOD 30 MIN: CPT | Performed by: PHYSICIAN ASSISTANT

## 2019-08-30 PROCEDURE — 90471 IMMUNIZATION ADMIN: CPT | Performed by: PHYSICIAN ASSISTANT

## 2019-08-30 PROCEDURE — 90715 TDAP VACCINE 7 YRS/> IM: CPT | Performed by: PHYSICIAN ASSISTANT

## 2019-08-30 RX ORDER — DOXYCYCLINE HYCLATE 100 MG
100 TABLET ORAL 2 TIMES DAILY
Qty: 20 TABLET | Refills: 0 | Status: SHIPPED | OUTPATIENT
Start: 2019-08-30 | End: 2019-09-09

## 2019-08-30 RX ORDER — VALACYCLOVIR HYDROCHLORIDE 1 G/1
TABLET, FILM COATED ORAL
Qty: 4 TABLET | Refills: 5 | Status: SHIPPED | OUTPATIENT
Start: 2019-08-30 | End: 2020-01-24 | Stop reason: SDUPTHER

## 2019-08-30 RX ORDER — CLINDAMYCIN HYDROCHLORIDE 150 MG/1
300 CAPSULE ORAL 4 TIMES DAILY
Qty: 80 CAPSULE | Refills: 0 | Status: SHIPPED | OUTPATIENT
Start: 2019-08-30 | End: 2019-09-17

## 2019-08-30 RX ORDER — CEPHALEXIN 500 MG/1
500 CAPSULE ORAL 4 TIMES DAILY
COMMUNITY
End: 2019-08-30 | Stop reason: HOSPADM

## 2019-08-30 ASSESSMENT — ENCOUNTER SYMPTOMS
EYE REDNESS: 0
SORE THROAT: 1
COUGH: 0
EYE DISCHARGE: 0
SINUS PRESSURE: 0
VOMITING: 0
DIARRHEA: 0
EYE PAIN: 0
ABDOMINAL DISTENTION: 0
WHEEZING: 0
BACK PAIN: 0
SHORTNESS OF BREATH: 0
NAUSEA: 0
ABDOMINAL PAIN: 0

## 2019-08-30 NOTE — PATIENT INSTRUCTIONS
healthcare professional. [place] [address] any warranty or liability for your use of this information. Patient Education        Candidiasis: Care Instructions  Your Care Instructions  Candidiasis (say \"rrj-aev-PJ-uh-dafne\") is a yeast infection. Yeast normally lives in your body. But it can cause problems if your body's defenses don't work as they should. Some medicines can increase your chance of getting a yeast infection. These include antibiotics, steroids, and cancer drugs. And some diseases like AIDS and diabetes can make you more likely to get yeast infections. There are different types of yeast infections. Shruthi Crane is a yeast infection in the mouth. It usually occurs in people with weak immune systems. It causes white patches inside the mouth and throat. Yeast infections of the skin usually occur in skin folds where the skin stays moist. They cause red, oozing patches on your skin. Babies can get these infections under the diaper. People who often wear gloves can get them on their hands. Many women get vaginal yeast infections. They are most common when women take antibiotics. These infections can cause the vagina to itch and burn. They also cause white discharge that looks like cottage cheese. In rare cases, yeast infects the blood. This can cause serious disease. This kind of infection is treated with medicine given through a needle into a vein (IV). After you start treatment, a yeast infection usually goes away quickly. But if your immune system is weak, the infection may come back. Tell your doctor if you get yeast infections often. Follow-up care is a key part of your treatment and safety. Be sure to make and go to all appointments, and call your doctor if you are having problems. It's also a good idea to know your test results and keep a list of the medicines you take. How can you care for yourself at home? · Take your medicines exactly as prescribed.  Call your doctor if

## 2019-08-31 NOTE — PROGRESS NOTES
8/30/2019     Levi Holt    HPI:  Patient presents office for same-day evaluation. Patient went to a local urgent care recently and was started on some oral Keflex for a left lower leg cellulitis. Patient states he has had similar in the past.  He states he has some mild dry skin and eczema. He had an area to his left lateral distal calf that was dry and scaly and they feel that this was a source of his infection. He states that the area has somewhat improved. He is still having some persistent swelling to his left lower leg. He states he has had some intermittent fever and possible chills. He has been trying to elevate his leg at rest.  He has been taking some Motrin for the pain if needed. He denies any recent travel for any long extended period. He complains of no shortness of breath or chest discomfort. He is not a known diabetic. He also reports he has been having some issues with cold sores intermittently, he is requesting a refill for his Valtrex. He states that because of the oral antibiotic, he currently is suffering from thrush and is also requesting medication concerning this. He had some previous Peridex at home that he was using, but it was not seeming to help with his symptoms. ROS:  Review of Systems   Constitutional: Positive for chills (intermittent) and fever (intermittent per patient). Negative for diaphoresis and fatigue. HENT: Positive for mouth sores (intermittent cold sores) and sore throat (thrush from recent antibiotic). Negative for ear pain and sinus pressure. Eyes: Negative for pain, discharge and redness. Respiratory: Negative for cough, shortness of breath and wheezing. Cardiovascular: Negative for chest pain. Gastrointestinal: Negative for abdominal distention, abdominal pain, diarrhea, nausea and vomiting. Endocrine: Negative for polydipsia and polyuria. Genitourinary: Negative for dysuria and frequency.    Musculoskeletal: Negative for

## 2019-09-13 ENCOUNTER — OFFICE VISIT (OUTPATIENT)
Dept: FAMILY MEDICINE CLINIC | Age: 49
End: 2019-09-13
Payer: COMMERCIAL

## 2019-09-13 VITALS
OXYGEN SATURATION: 97 % | SYSTOLIC BLOOD PRESSURE: 132 MMHG | HEART RATE: 68 BPM | HEIGHT: 75 IN | BODY MASS INDEX: 39.17 KG/M2 | DIASTOLIC BLOOD PRESSURE: 64 MMHG | WEIGHT: 315 LBS | TEMPERATURE: 97.8 F

## 2019-09-13 DIAGNOSIS — L30.9 DERMATITIS: Primary | ICD-10-CM

## 2019-09-13 DIAGNOSIS — L85.3 DRY SKIN: ICD-10-CM

## 2019-09-13 PROCEDURE — 99213 OFFICE O/P EST LOW 20 MIN: CPT | Performed by: PHYSICIAN ASSISTANT

## 2019-09-13 ASSESSMENT — ENCOUNTER SYMPTOMS
SORE THROAT: 0
COLOR CHANGE: 0
ABDOMINAL PAIN: 0
EYE PAIN: 0
EYE REDNESS: 0
DIARRHEA: 0
SHORTNESS OF BREATH: 0
SINUS PRESSURE: 0
NAUSEA: 0
VOMITING: 0
BACK PAIN: 0
COUGH: 0
WHEEZING: 0

## 2019-09-17 ENCOUNTER — OFFICE VISIT (OUTPATIENT)
Dept: FAMILY MEDICINE CLINIC | Age: 49
End: 2019-09-17
Payer: COMMERCIAL

## 2019-09-17 VITALS
BODY MASS INDEX: 39.17 KG/M2 | WEIGHT: 315 LBS | RESPIRATION RATE: 18 BRPM | HEART RATE: 59 BPM | HEIGHT: 75 IN | SYSTOLIC BLOOD PRESSURE: 126 MMHG | DIASTOLIC BLOOD PRESSURE: 80 MMHG | OXYGEN SATURATION: 96 %

## 2019-09-17 DIAGNOSIS — L03.116 CELLULITIS OF LEFT LOWER EXTREMITY: ICD-10-CM

## 2019-09-17 DIAGNOSIS — I87.2 VENOUS INSUFFICIENCY (CHRONIC) (PERIPHERAL): Primary | ICD-10-CM

## 2019-09-17 PROCEDURE — 99213 OFFICE O/P EST LOW 20 MIN: CPT | Performed by: FAMILY MEDICINE

## 2019-09-17 RX ORDER — DOXYCYCLINE HYCLATE 100 MG
100 TABLET ORAL 2 TIMES DAILY WITH MEALS
Qty: 40 TABLET | Refills: 0 | Status: SHIPPED | OUTPATIENT
Start: 2019-09-17 | End: 2019-10-07

## 2019-09-17 ASSESSMENT — PATIENT HEALTH QUESTIONNAIRE - PHQ9
SUM OF ALL RESPONSES TO PHQ9 QUESTIONS 1 & 2: 0
1. LITTLE INTEREST OR PLEASURE IN DOING THINGS: 0
SUM OF ALL RESPONSES TO PHQ QUESTIONS 1-9: 0
SUM OF ALL RESPONSES TO PHQ QUESTIONS 1-9: 0
2. FEELING DOWN, DEPRESSED OR HOPELESS: 0

## 2019-09-18 ASSESSMENT — ENCOUNTER SYMPTOMS
ANAL BLEEDING: 0
BACK PAIN: 0
RECTAL PAIN: 0
CHOKING: 0
EYE DISCHARGE: 0
VOMITING: 0
SORE THROAT: 0
RHINORRHEA: 0
CONSTIPATION: 0
SINUS PRESSURE: 0
CHEST TIGHTNESS: 0
COLOR CHANGE: 0
EYE REDNESS: 0
VOICE CHANGE: 0
FACIAL SWELLING: 0
PHOTOPHOBIA: 0
ABDOMINAL PAIN: 0
APNEA: 0
ABDOMINAL DISTENTION: 0
COUGH: 0
STRIDOR: 0
BLOOD IN STOOL: 0
EYE ITCHING: 0
EYE PAIN: 0
WHEEZING: 0
NAUSEA: 0
TROUBLE SWALLOWING: 0
SHORTNESS OF BREATH: 0
DIARRHEA: 0

## 2019-09-23 ENCOUNTER — TELEPHONE (OUTPATIENT)
Dept: INTERNAL MEDICINE CLINIC | Age: 49
End: 2019-09-23

## 2019-09-23 ENCOUNTER — OFFICE VISIT (OUTPATIENT)
Dept: INTERNAL MEDICINE CLINIC | Age: 49
End: 2019-09-23
Payer: COMMERCIAL

## 2019-09-23 ENCOUNTER — HOSPITAL ENCOUNTER (OUTPATIENT)
Dept: INTERVENTIONAL RADIOLOGY/VASCULAR | Age: 49
Discharge: HOME | End: 2019-09-25
Payer: COMMERCIAL

## 2019-09-23 ENCOUNTER — HOSPITAL ENCOUNTER (OUTPATIENT)
Dept: ULTRASOUND IMAGING | Age: 49
Discharge: HOME | End: 2019-09-23
Payer: COMMERCIAL

## 2019-09-23 ENCOUNTER — TELEPHONE (OUTPATIENT)
Dept: FAMILY MEDICINE CLINIC | Age: 49
End: 2019-09-23

## 2019-09-23 VITALS
BODY MASS INDEX: 38.36 KG/M2 | DIASTOLIC BLOOD PRESSURE: 86 MMHG | OXYGEN SATURATION: 94 % | TEMPERATURE: 98.3 F | WEIGHT: 315 LBS | HEIGHT: 76 IN | SYSTOLIC BLOOD PRESSURE: 137 MMHG | HEART RATE: 68 BPM

## 2019-09-23 DIAGNOSIS — M79.662 PAIN AND SWELLING OF LEFT LOWER LEG: Primary | ICD-10-CM

## 2019-09-23 DIAGNOSIS — M79.89 PAIN AND SWELLING OF LEFT LOWER LEG: ICD-10-CM

## 2019-09-23 DIAGNOSIS — M79.89 PAIN AND SWELLING OF LEFT LOWER LEG: Primary | ICD-10-CM

## 2019-09-23 DIAGNOSIS — L03.116 CELLULITIS OF LEFT LOWER EXTREMITY: ICD-10-CM

## 2019-09-23 DIAGNOSIS — M79.662 PAIN AND SWELLING OF LEFT LOWER LEG: ICD-10-CM

## 2019-09-23 DIAGNOSIS — L03.116 CELLULITIS OF LEFT LOWER EXTREMITY: Primary | ICD-10-CM

## 2019-09-23 PROCEDURE — 93971 EXTREMITY STUDY: CPT

## 2019-09-23 PROCEDURE — 99213 OFFICE O/P EST LOW 20 MIN: CPT | Performed by: PHYSICIAN ASSISTANT

## 2019-09-23 PROCEDURE — 99212 OFFICE O/P EST SF 10 MIN: CPT | Performed by: PHYSICIAN ASSISTANT

## 2019-09-23 RX ORDER — SULFAMETHOXAZOLE AND TRIMETHOPRIM 800; 160 MG/1; MG/1
TABLET ORAL
COMMUNITY
Start: 2019-09-22 | End: 2019-10-02

## 2019-09-23 ASSESSMENT — ENCOUNTER SYMPTOMS
SINUS PRESSURE: 0
NAUSEA: 0
SORE THROAT: 0
DIARRHEA: 0
VOMITING: 0
ABDOMINAL DISTENTION: 0
COUGH: 0
BACK PAIN: 0
EYE REDNESS: 0
WHEEZING: 0
SHORTNESS OF BREATH: 0
EYE PAIN: 0

## 2019-09-23 NOTE — PATIENT INSTRUCTIONS
scrapes, or other injuries to your skin. Cellulitis most often occurs where there is a break in the skin. · If you get a scrape, cut, mild burn, or bite, wash the wound with clean water as soon as you can to help avoid infection. Don't use hydrogen peroxide or alcohol, which can slow healing. · If you have swelling in your legs (edema), support stockings and good skin care may help prevent leg sores and cellulitis. · Take care of your feet, especially if you have diabetes or other conditions that increase the risk of infection. Wear shoes and socks. Do not go barefoot. If you have athlete's foot or other skin problems on your feet, talk to your doctor about how to treat them. When should you call for help? Call your doctor now or seek immediate medical care if:    · You have signs that your infection is getting worse, such as:  ? Increased pain, swelling, warmth, or redness. ? Red streaks leading from the area. ? Pus draining from the area. ? A fever.     · You get a rash.    Watch closely for changes in your health, and be sure to contact your doctor if:    · You do not get better as expected. Where can you learn more? Go to https://PlayMobs.kajeet. org and sign in to your el? account. Enter A544 in the Taplet box to learn more about \"Cellulitis: Care Instructions. \"     If you do not have an account, please click on the \"Sign Up Now\" link. Current as of: April 1, 2019  Content Version: 12.1  © 9644-6908 Healthwise, Incorporated. Care instructions adapted under license by [facility] [place] ([facility]). If you have questions about a medical condition or this instruction, always ask your healthcare professional. [place] [address] any warranty or liability for your use of this information. Patient Education        Leg Pain: Care Instructions  Your Care Instructions  Many things can cause leg pain.  Too much exercise or overuse can cause a muscle cramp (or charley horse). You can get leg cramps from not eating a balanced diet that has enough potassium, calcium, and other minerals. If you do not drink enough fluids or are taking certain medicines, you may develop leg cramps. Other causes of leg pain include injuries, blood flow problems, nerve damage, and twisted and enlarged veins (varicose veins). You can usually ease pain with self-care. Your doctor may recommend that you rest your leg and keep it elevated. Follow-up care is a key part of your treatment and safety. Be sure to make and go to all appointments, and call your doctor if you are having problems. It's also a good idea to know your test results and keep a list of the medicines you take. How can you care for yourself at home? · Take pain medicines exactly as directed. ? If the doctor gave you a prescription medicine for pain, take it as prescribed. ? If you are not taking a prescription pain medicine, ask your doctor if you can take an over-the-counter medicine. · Take any other medicines exactly as prescribed. Call your doctor if you think you are having a problem with your medicine. · Rest your leg while you have pain, and avoid standing for long periods of time. · Prop up your leg at or above the level of your heart when possible. · Make sure you are eating a balanced diet that is rich in calcium, potassium, and magnesium, especially if you are pregnant. · If directed by your doctor, put ice or a cold pack on the area for 10 to 20 minutes at a time. Put a thin cloth between the ice and your skin. · Your leg may be in a splint, a brace, or an elastic bandage, and you may have crutches to help you walk. Follow your doctor's directions about how long to wear supports and how to use the crutches. When should you call for help? Call 911 anytime you think you may need emergency care.  For example, call if:    · You have sudden chest pain and shortness of breath, or you cough up blood.     · Your leg is cool or pale or changes color.    Call your doctor now or seek immediate medical care if:    · You have increasing or severe pain.     · Your leg suddenly feels weak and you cannot move it.     · You have signs of a blood clot, such as:  ? Pain in your calf, back of the knee, thigh, or groin. ? Redness and swelling in your leg or groin.     · You have signs of infection, such as:  ? Increased pain, swelling, warmth, or redness. ? Red streaks leading from the sore area. ? Pus draining from a place on your leg. ? A fever.     · You cannot bear weight on your leg.    Watch closely for changes in your health, and be sure to contact your doctor if:    · You do not get better as expected. Where can you learn more? Go to https://Modus eDiscoverypeRotoPopeb.[employer]. org and sign in to your WebThriftStore account. Enter K889 in the Incont box to learn more about \"Leg Pain: Care Instructions. \"     If you do not have an account, please click on the \"Sign Up Now\" link. Current as of: September 23, 2018  Content Version: 12.1  © 6853-5114 Healthwise, Incorporated. Care instructions adapted under license by [facility] ([facility]). If you have questions about a medical condition or this instruction, always ask your healthcare professional. [address] any warranty or liability for your use of this information.

## 2019-09-23 NOTE — PROGRESS NOTES
dizziness, weakness, light-headedness, numbness and headaches. Hematological: Negative for adenopathy. Psychiatric/Behavioral: Negative. All other systems reviewed and are negative. PHYSICAL EXAM:    Physical Exam   Constitutional: He is oriented to person, place, and time. He appears well-developed and well-nourished. HENT:   Head: Normocephalic and atraumatic. Eyes: Pupils are equal, round, and reactive to light. Conjunctivae and EOM are normal.   Neck: Normal range of motion. Neck supple. Cardiovascular: Normal rate, regular rhythm and normal heart sounds. Pulmonary/Chest: Effort normal and breath sounds normal.   Abdominal: Soft. Bowel sounds are normal. There is no tenderness. Musculoskeletal: Normal range of motion. He exhibits edema (diffuse edema to left lower extremity beneath left knee) and tenderness (tenderness overlying left posterior calf/appears firm). Local STS and tenderness to left lower leg, redness to left anterior/proximal and left lateral/distal lower leg. Dry scabbed wound noted to left lateral inferior leg, approximately 2cm in total diameter. Please refer to pictures below. No posterior left popliteal fullness or palpable cord. Neurological: He is alert and oriented to person, place, and time. Distal pulses left foot DP and PT intact and strong. Skin: Skin is warm and dry. Capillary refill takes less than 2 seconds. Rash noted. There is erythema. Redness to left lower leg, beneath left knee. Psychiatric: He has a normal mood and affect. His behavior is normal. Judgment and thought content normal.   Nursing note and vitals reviewed. Left lateral lower leg wound    Left anterior lower leg    /86   Pulse 68   Temp 98.3 °F (36.8 °C) (Oral)   Ht 6' 4\" (1.93 m)   Wt (!) 349 lb (158.3 kg)   SpO2 94%   BMI 42.48 kg/m²     ASSESSMENT/PLAN:  Evelina Burk was seen today for check-up and health maintenance.     Diagnoses and all orders for this

## 2019-09-23 NOTE — Clinical Note
Karma Tavareseleuterio came to see me in [facility]. He is on the Doxy- you gave him and Bactrim he got from [address]. I sent him for STAT US to R/O DVT. His appointment with vascular not until November. ? If your office could call to see if he could get in sooner or ? Schedule MIGUEL ANGEL testing until that time. Not sure what else to do with him at this time. I put pictures in chart for you. Merlyn Peterson PA-C

## 2019-09-24 DIAGNOSIS — L03.116 CELLULITIS OF LEFT LOWER EXTREMITY: Primary | ICD-10-CM

## 2019-09-24 DIAGNOSIS — I87.2 VENOUS INSUFFICIENCY (CHRONIC) (PERIPHERAL): ICD-10-CM

## 2019-09-30 ENCOUNTER — HOSPITAL ENCOUNTER (OUTPATIENT)
Dept: WOUND CARE | Age: 49
Discharge: HOME | End: 2019-09-30
Payer: COMMERCIAL

## 2019-09-30 VITALS
HEART RATE: 62 BPM | RESPIRATION RATE: 18 BRPM | BODY MASS INDEX: 38.36 KG/M2 | WEIGHT: 315 LBS | SYSTOLIC BLOOD PRESSURE: 110 MMHG | HEIGHT: 76 IN | TEMPERATURE: 98.9 F | DIASTOLIC BLOOD PRESSURE: 72 MMHG

## 2019-09-30 DIAGNOSIS — L97.921 SKIN ULCER OF LEFT LOWER LEG, LIMITED TO BREAKDOWN OF SKIN (HCC): Primary | ICD-10-CM

## 2019-09-30 DIAGNOSIS — I73.9 PAD (PERIPHERAL ARTERY DISEASE) (HCC): ICD-10-CM

## 2019-09-30 PROCEDURE — 99213 OFFICE O/P EST LOW 20 MIN: CPT

## 2019-09-30 PROCEDURE — 87070 CULTURE OTHR SPECIMN AEROBIC: CPT

## 2019-09-30 PROCEDURE — 87075 CULTR BACTERIA EXCEPT BLOOD: CPT

## 2019-10-02 LAB — WOUND/ABSCESS: NORMAL

## 2019-10-04 ENCOUNTER — HOSPITAL ENCOUNTER (OUTPATIENT)
Dept: INTERVENTIONAL RADIOLOGY/VASCULAR | Age: 49
Discharge: HOME | End: 2019-10-06
Payer: COMMERCIAL

## 2019-10-04 DIAGNOSIS — L97.921 SKIN ULCER OF LEFT LOWER LEG, LIMITED TO BREAKDOWN OF SKIN (HCC): ICD-10-CM

## 2019-10-04 PROCEDURE — 93923 UPR/LXTR ART STDY 3+ LVLS: CPT

## 2019-10-05 LAB — ANAEROBIC CULTURE: NORMAL

## 2019-10-07 ENCOUNTER — HOSPITAL ENCOUNTER (OUTPATIENT)
Dept: WOUND CARE | Age: 49
Discharge: HOME | End: 2019-10-07
Payer: COMMERCIAL

## 2019-10-07 VITALS
SYSTOLIC BLOOD PRESSURE: 106 MMHG | RESPIRATION RATE: 18 BRPM | BODY MASS INDEX: 38.36 KG/M2 | HEART RATE: 68 BPM | DIASTOLIC BLOOD PRESSURE: 78 MMHG | WEIGHT: 315 LBS | TEMPERATURE: 98.8 F | HEIGHT: 76 IN

## 2019-10-07 PROCEDURE — 11042 DBRDMT SUBQ TIS 1ST 20SQCM/<: CPT

## 2019-10-15 ENCOUNTER — HOSPITAL ENCOUNTER (OUTPATIENT)
Dept: WOUND CARE | Age: 49
Discharge: HOME | End: 2019-10-15
Payer: COMMERCIAL

## 2019-10-15 VITALS
WEIGHT: 315 LBS | TEMPERATURE: 97.7 F | BODY MASS INDEX: 38.36 KG/M2 | RESPIRATION RATE: 16 BRPM | HEIGHT: 76 IN | DIASTOLIC BLOOD PRESSURE: 80 MMHG | SYSTOLIC BLOOD PRESSURE: 130 MMHG | HEART RATE: 60 BPM

## 2019-10-15 DIAGNOSIS — L81.9 DISCOLORATION OF SKIN OF LOWER LEG: Primary | Chronic | ICD-10-CM

## 2019-10-15 PROCEDURE — 99213 OFFICE O/P EST LOW 20 MIN: CPT

## 2019-10-15 PROCEDURE — 99203 OFFICE O/P NEW LOW 30 MIN: CPT | Performed by: SURGERY

## 2019-10-15 RX ORDER — LIDOCAINE HYDROCHLORIDE 20 MG/ML
JELLY TOPICAL ONCE
Status: DISCONTINUED | OUTPATIENT
Start: 2019-10-15 | End: 2019-10-16 | Stop reason: HOSPADM

## 2019-10-16 DIAGNOSIS — M51.16 LUMBAR DISC DISEASE WITH RADICULOPATHY: ICD-10-CM

## 2019-10-17 RX ORDER — GABAPENTIN 600 MG/1
600 TABLET ORAL 3 TIMES DAILY
Qty: 270 TABLET | Refills: 5 | Status: SHIPPED | OUTPATIENT
Start: 2019-10-17 | End: 2019-10-30 | Stop reason: SDUPTHER

## 2019-10-21 ENCOUNTER — HOSPITAL ENCOUNTER (OUTPATIENT)
Dept: WOUND CARE | Age: 49
Discharge: HOME | End: 2019-10-21
Payer: COMMERCIAL

## 2019-10-21 DIAGNOSIS — F51.01 PRIMARY INSOMNIA: ICD-10-CM

## 2019-10-21 RX ORDER — TRAZODONE HYDROCHLORIDE 50 MG/1
50 TABLET ORAL NIGHTLY
Qty: 60 TABLET | Refills: 5 | Status: SHIPPED | OUTPATIENT
Start: 2019-10-21 | End: 2019-11-08 | Stop reason: SDUPTHER

## 2019-10-30 ENCOUNTER — OFFICE VISIT (OUTPATIENT)
Dept: FAMILY MEDICINE CLINIC | Age: 49
End: 2019-10-30
Payer: COMMERCIAL

## 2019-10-30 ENCOUNTER — HOSPITAL ENCOUNTER (OUTPATIENT)
Dept: INTERVENTIONAL RADIOLOGY/VASCULAR | Age: 49
Discharge: HOME | End: 2019-11-01
Payer: COMMERCIAL

## 2019-10-30 VITALS
WEIGHT: 315 LBS | OXYGEN SATURATION: 96 % | RESPIRATION RATE: 18 BRPM | HEART RATE: 53 BPM | BODY MASS INDEX: 38.36 KG/M2 | SYSTOLIC BLOOD PRESSURE: 120 MMHG | DIASTOLIC BLOOD PRESSURE: 78 MMHG | HEIGHT: 76 IN

## 2019-10-30 DIAGNOSIS — M51.16 LUMBAR DISC DISEASE WITH RADICULOPATHY: ICD-10-CM

## 2019-10-30 DIAGNOSIS — L81.9 DISCOLORATION OF SKIN OF LOWER LEG: Chronic | ICD-10-CM

## 2019-10-30 DIAGNOSIS — I87.2 VENOUS INSUFFICIENCY (CHRONIC) (PERIPHERAL): Primary | ICD-10-CM

## 2019-10-30 PROCEDURE — 99213 OFFICE O/P EST LOW 20 MIN: CPT | Performed by: FAMILY MEDICINE

## 2019-10-30 PROCEDURE — 93970 EXTREMITY STUDY: CPT

## 2019-10-30 RX ORDER — HYDROCHLOROTHIAZIDE 25 MG/1
25 TABLET ORAL DAILY
Qty: 30 TABLET | Refills: 5 | Status: SHIPPED | OUTPATIENT
Start: 2019-10-30 | End: 2020-01-24 | Stop reason: SDUPTHER

## 2019-10-30 RX ORDER — GABAPENTIN 600 MG/1
600 TABLET ORAL 3 TIMES DAILY
Qty: 270 TABLET | Refills: 5 | Status: SHIPPED
Start: 2019-10-30 | End: 2020-12-02

## 2019-10-30 ASSESSMENT — PATIENT HEALTH QUESTIONNAIRE - PHQ9
2. FEELING DOWN, DEPRESSED OR HOPELESS: 0
SUM OF ALL RESPONSES TO PHQ QUESTIONS 1-9: 0
1. LITTLE INTEREST OR PLEASURE IN DOING THINGS: 0
SUM OF ALL RESPONSES TO PHQ QUESTIONS 1-9: 0
SUM OF ALL RESPONSES TO PHQ9 QUESTIONS 1 & 2: 0

## 2019-11-01 ASSESSMENT — ENCOUNTER SYMPTOMS
BLOOD IN STOOL: 0
SHORTNESS OF BREATH: 0
RHINORRHEA: 0
CONSTIPATION: 0
ANAL BLEEDING: 0
ABDOMINAL DISTENTION: 0
EYE PAIN: 0
CHOKING: 0
NAUSEA: 0
TROUBLE SWALLOWING: 0
DIARRHEA: 0
COUGH: 0
VOMITING: 0
WHEEZING: 0
RECTAL PAIN: 0
STRIDOR: 0
SORE THROAT: 0
BACK PAIN: 0
SINUS PRESSURE: 0
EYE REDNESS: 0
VOICE CHANGE: 0
APNEA: 0
EYE DISCHARGE: 0
CHEST TIGHTNESS: 0
ABDOMINAL PAIN: 0
FACIAL SWELLING: 0
PHOTOPHOBIA: 0
EYE ITCHING: 0
COLOR CHANGE: 0

## 2019-11-08 DIAGNOSIS — F51.01 PRIMARY INSOMNIA: ICD-10-CM

## 2019-11-08 RX ORDER — TRAZODONE HYDROCHLORIDE 50 MG/1
100 TABLET ORAL NIGHTLY
Qty: 60 TABLET | Refills: 5 | Status: SHIPPED | OUTPATIENT
Start: 2019-11-08 | End: 2020-01-24 | Stop reason: SDUPTHER

## 2019-12-04 ENCOUNTER — OFFICE VISIT (OUTPATIENT)
Dept: VASCULAR SURGERY | Age: 49
End: 2019-12-04
Payer: COMMERCIAL

## 2019-12-04 VITALS
HEIGHT: 76 IN | DIASTOLIC BLOOD PRESSURE: 69 MMHG | BODY MASS INDEX: 38.36 KG/M2 | RESPIRATION RATE: 16 BRPM | SYSTOLIC BLOOD PRESSURE: 123 MMHG | HEART RATE: 58 BPM | WEIGHT: 315 LBS

## 2019-12-04 DIAGNOSIS — L81.9 DISCOLORATION OF SKIN OF LOWER LEG: Primary | Chronic | ICD-10-CM

## 2019-12-04 DIAGNOSIS — I87.2 VENOUS INSUFFICIENCY: Chronic | ICD-10-CM

## 2019-12-04 PROCEDURE — 99213 OFFICE O/P EST LOW 20 MIN: CPT | Performed by: SURGERY

## 2019-12-23 ENCOUNTER — TELEPHONE (OUTPATIENT)
Dept: VASCULAR SURGERY | Age: 49
End: 2019-12-23

## 2020-01-10 RX ORDER — LISINOPRIL 40 MG/1
TABLET ORAL
Qty: 90 TABLET | Refills: 4 | Status: SHIPPED | OUTPATIENT
Start: 2020-01-10 | End: 2020-01-24 | Stop reason: SDUPTHER

## 2020-03-05 ENCOUNTER — E-VISIT (OUTPATIENT)
Dept: FAMILY MEDICINE CLINIC | Age: 50
End: 2020-03-05
Payer: COMMERCIAL

## 2020-03-05 PROCEDURE — 99421 OL DIG E/M SVC 5-10 MIN: CPT | Performed by: NURSE PRACTITIONER

## 2020-03-05 ASSESSMENT — LIFESTYLE VARIABLES: SMOKING_STATUS: NO, I'VE NEVER SMOKED

## 2020-03-06 RX ORDER — AZITHROMYCIN 250 MG/1
250 TABLET, FILM COATED ORAL SEE ADMIN INSTRUCTIONS
Qty: 6 TABLET | Refills: 0 | Status: SHIPPED | OUTPATIENT
Start: 2020-03-06 | End: 2020-03-11

## 2020-05-01 ENCOUNTER — OFFICE VISIT (OUTPATIENT)
Dept: FAMILY MEDICINE CLINIC | Age: 50
End: 2020-05-01
Payer: COMMERCIAL

## 2020-05-01 VITALS
DIASTOLIC BLOOD PRESSURE: 78 MMHG | RESPIRATION RATE: 20 BRPM | HEART RATE: 53 BPM | BODY MASS INDEX: 38.36 KG/M2 | SYSTOLIC BLOOD PRESSURE: 124 MMHG | OXYGEN SATURATION: 97 % | WEIGHT: 315 LBS | HEIGHT: 76 IN

## 2020-05-01 PROCEDURE — 99214 OFFICE O/P EST MOD 30 MIN: CPT | Performed by: FAMILY MEDICINE

## 2020-05-01 RX ORDER — PROPRANOLOL HYDROCHLORIDE 80 MG/1
80 CAPSULE, EXTENDED RELEASE ORAL DAILY
Qty: 90 CAPSULE | Refills: 5 | Status: SHIPPED
Start: 2020-05-01 | End: 2021-05-03 | Stop reason: SDUPTHER

## 2020-05-01 ASSESSMENT — PATIENT HEALTH QUESTIONNAIRE - PHQ9
1. LITTLE INTEREST OR PLEASURE IN DOING THINGS: 0
SUM OF ALL RESPONSES TO PHQ9 QUESTIONS 1 & 2: 0
SUM OF ALL RESPONSES TO PHQ QUESTIONS 1-9: 0
SUM OF ALL RESPONSES TO PHQ QUESTIONS 1-9: 0
2. FEELING DOWN, DEPRESSED OR HOPELESS: 0

## 2020-05-04 ASSESSMENT — ENCOUNTER SYMPTOMS
BACK PAIN: 0
TROUBLE SWALLOWING: 0
PHOTOPHOBIA: 0
EYE PAIN: 0
SINUS PRESSURE: 0
EYE ITCHING: 0
COUGH: 0
APNEA: 0
CHEST TIGHTNESS: 0
CONSTIPATION: 0
NAUSEA: 0
FACIAL SWELLING: 0
DIARRHEA: 0
CHOKING: 0
ANAL BLEEDING: 0
EYE REDNESS: 0
SORE THROAT: 0
RECTAL PAIN: 0
COLOR CHANGE: 0
SHORTNESS OF BREATH: 0
RHINORRHEA: 0
ABDOMINAL PAIN: 0
WHEEZING: 0
VOICE CHANGE: 0
EYE DISCHARGE: 0
HEARTBURN: 1
ABDOMINAL DISTENTION: 0
BLOOD IN STOOL: 0
VOMITING: 0
STRIDOR: 0

## 2020-05-05 NOTE — PROGRESS NOTES
Monse Bales is a 53 y.o. male  . Subjective:      Hypertension   This is a chronic problem. The current episode started more than 1 year ago. The problem has been waxing and waning since onset. The problem is resistant. Pertinent negatives include no chest pain, headaches, neck pain, palpitations or shortness of breath. Risk factors for coronary artery disease include dyslipidemia, male gender and obesity. Past treatments include ACE inhibitors and beta blockers. The current treatment provides moderate improvement. There are no compliance problems. Gastroesophageal Reflux   He complains of heartburn. He reports no abdominal pain, no chest pain, no choking, no coughing, no nausea, no sore throat or no wheezing. This is a chronic problem. The current episode started more than 1 year ago. The problem occurs occasionally. The problem has been waxing and waning. The heartburn duration is less than a minute. The heartburn is located in the substernum. The heartburn is of moderate intensity. The heartburn does not wake him from sleep. The heartburn does not limit his activity. The heartburn doesn't change with position. Nothing aggravates the symptoms. Associated symptoms include fatigue. There are no known risk factors. He has tried a PPI for the symptoms. The treatment provided moderate relief. Hyperlipidemia   This is a chronic problem. The current episode started more than 1 year ago. The problem is resistant. Recent lipid tests were reviewed and are variable. Pertinent negatives include no chest pain, myalgias or shortness of breath. Current antihyperlipidemic treatment includes diet change. The current treatment provides mild improvement of lipids. Compliance problems include adherence to diet, adherence to exercise and medication side effects. Risk factors for coronary artery disease include diabetes mellitus, dyslipidemia, hypertension, male sex and obesity.    Other   This is a recurrent (umbilical hernia) Endocrine: Negative for cold intolerance, heat intolerance, polydipsia, polyphagia and polyuria. Genitourinary: Negative for decreased urine volume, difficulty urinating, discharge, dysuria, enuresis, flank pain, frequency, genital sores, hematuria, penile pain, penile swelling, scrotal swelling, testicular pain and urgency. Musculoskeletal: Positive for arthralgias. Negative for back pain, gait problem, joint swelling, myalgias, neck pain and neck stiffness. Skin: Negative for color change, pallor, rash and wound. Allergic/Immunologic: Positive for environmental allergies. Negative for food allergies and immunocompromised state. Neurological: Negative for dizziness, tremors, seizures, syncope, facial asymmetry, speech difficulty, weakness, light-headedness, numbness and headaches. Hematological: Negative for adenopathy. Does not bruise/bleed easily. Psychiatric/Behavioral: Negative for agitation, behavioral problems, confusion, decreased concentration, dysphoric mood, hallucinations, self-injury, sleep disturbance and suicidal ideas. The patient is not nervous/anxious and is not hyperactive.         Past Medical History:   Diagnosis Date    Colitis, ulcerative chronic (HCC)     GERD (gastroesophageal reflux disease)     Hypertension        Social History     Socioeconomic History    Marital status:      Spouse name: Not on file    Number of children: Not on file    Years of education: Not on file    Highest education level: Not on file   Occupational History    Not on file   Social Needs    Financial resource strain: Not on file    Food insecurity     Worry: Not on file     Inability: Not on file    Transportation needs     Medical: Not on file     Non-medical: Not on file   Tobacco Use    Smoking status: Never Smoker    Smokeless tobacco: Never Used   Substance and Sexual Activity    Alcohol use: No     Comment: occasional     Drug use: No    Sexual activity: Yes     Partners:

## 2020-05-11 ENCOUNTER — INITIAL CONSULT (OUTPATIENT)
Dept: SURGERY | Age: 50
End: 2020-05-11
Payer: COMMERCIAL

## 2020-05-11 VITALS
HEIGHT: 76 IN | WEIGHT: 315 LBS | DIASTOLIC BLOOD PRESSURE: 82 MMHG | TEMPERATURE: 97.7 F | OXYGEN SATURATION: 95 % | SYSTOLIC BLOOD PRESSURE: 135 MMHG | BODY MASS INDEX: 38.36 KG/M2 | HEART RATE: 62 BPM

## 2020-05-11 PROCEDURE — 99204 OFFICE O/P NEW MOD 45 MIN: CPT | Performed by: SURGERY

## 2020-05-22 RX ORDER — VALACYCLOVIR HYDROCHLORIDE 1 G/1
TABLET, FILM COATED ORAL
Qty: 12 TABLET | Refills: 5 | Status: SHIPPED
Start: 2020-05-22 | End: 2021-06-15

## 2020-05-22 RX ORDER — HYDROCHLOROTHIAZIDE 25 MG/1
25 TABLET ORAL DAILY
Qty: 90 TABLET | Refills: 5 | Status: SHIPPED
Start: 2020-05-22 | End: 2021-07-07

## 2020-06-24 RX ORDER — PANTOPRAZOLE SODIUM 40 MG/1
40 TABLET, DELAYED RELEASE ORAL 2 TIMES DAILY
Qty: 60 TABLET | Refills: 5 | Status: SHIPPED
Start: 2020-06-24 | End: 2020-12-11

## 2020-07-06 ENCOUNTER — TELEPHONE (OUTPATIENT)
Dept: SURGERY | Age: 50
End: 2020-07-06

## 2020-07-06 NOTE — TELEPHONE ENCOUNTER
Per Dr. Felipa Jim, patient is scheduled for Lap robot umbilical hernia repair with mesh at [address] [place] [address] on 7/14/20. Surgery scheduling form faxed with confirmation, surgeon's calendar updated. Dr. Felipa Jim to enter orders. Follow up appointment scheduled. Will check if pre-cert is required.    Electronically signed by Camille Yeboah RN on 7/6/2020 at 1:42 PM

## 2020-07-07 NOTE — TELEPHONE ENCOUNTER
MA called and spoke with Zelda Alvarez regarding CPT 68795 and ICD 10 K42.9. Rep advised there was authorization required. Authorization obtained and Ref # [id number]    Forms scanned into chart.     Electronically signed by Maribel Prieto MA on 7/7/2020 at 1:02 PM

## 2020-07-08 ENCOUNTER — HOSPITAL ENCOUNTER (OUTPATIENT)
Dept: PREADMISSION TESTING | Age: 50
Discharge: HOME | End: 2020-07-08
Payer: COMMERCIAL

## 2020-07-08 ENCOUNTER — HOSPITAL ENCOUNTER (OUTPATIENT)
Age: 50
Discharge: HOME | End: 2020-07-08
Payer: COMMERCIAL

## 2020-07-08 ENCOUNTER — HOSPITAL ENCOUNTER (OUTPATIENT)
Age: 50
Discharge: HOME | End: 2020-07-10
Payer: COMMERCIAL

## 2020-07-08 VITALS
DIASTOLIC BLOOD PRESSURE: 57 MMHG | HEART RATE: 61 BPM | BODY MASS INDEX: 38.36 KG/M2 | SYSTOLIC BLOOD PRESSURE: 111 MMHG | OXYGEN SATURATION: 96 % | HEIGHT: 76 IN | TEMPERATURE: 97 F | WEIGHT: 315 LBS | RESPIRATION RATE: 16 BRPM

## 2020-07-08 LAB
ALBUMIN SERPL-MCNC: 4.2 G/DL (ref 3.5–5.2)
ALP BLD-CCNC: 69 U/L (ref 40–129)
ALT SERPL-CCNC: 28 U/L (ref 0–40)
ANION GAP SERPL CALCULATED.3IONS-SCNC: 11 MMOL/L (ref 7–16)
AST SERPL-CCNC: 28 U/L (ref 0–39)
BASOPHILS ABSOLUTE: 0.04 E9/L (ref 0–0.2)
BASOPHILS RELATIVE PERCENT: 0.5 % (ref 0–2)
BILIRUB SERPL-MCNC: 0.4 MG/DL (ref 0–1.2)
BUN BLDV-MCNC: 24 MG/DL (ref 6–20)
CALCIUM SERPL-MCNC: 9.4 MG/DL (ref 8.6–10.2)
CHLORIDE BLD-SCNC: 103 MMOL/L (ref 98–107)
CHOLESTEROL, TOTAL: 171 MG/DL (ref 0–199)
CO2: 27 MMOL/L (ref 22–29)
CREAT SERPL-MCNC: 1.3 MG/DL (ref 0.7–1.2)
EOSINOPHILS ABSOLUTE: 0.33 E9/L (ref 0.05–0.5)
EOSINOPHILS RELATIVE PERCENT: 4 % (ref 0–6)
GFR AFRICAN AMERICAN: >60
GFR NON-AFRICAN AMERICAN: 59 ML/MIN/1.73
GLUCOSE BLD-MCNC: 96 MG/DL (ref 74–99)
HBA1C MFR BLD: 5.2 % (ref 4–5.6)
HCT VFR BLD CALC: 45.5 % (ref 37–54)
HDLC SERPL-MCNC: 40 MG/DL
HEMOGLOBIN: 14.6 G/DL (ref 12.5–16.5)
IMMATURE GRANULOCYTES #: 0.03 E9/L
IMMATURE GRANULOCYTES %: 0.4 % (ref 0–5)
LDL CHOLESTEROL CALCULATED: 110 MG/DL (ref 0–99)
LYMPHOCYTES ABSOLUTE: 1.74 E9/L (ref 1.5–4)
LYMPHOCYTES RELATIVE PERCENT: 21.3 % (ref 20–42)
MCH RBC QN AUTO: 28.7 PG (ref 26–35)
MCHC RBC AUTO-ENTMCNC: 32.1 % (ref 32–34.5)
MCV RBC AUTO: 89.6 FL (ref 80–99.9)
MONOCYTES ABSOLUTE: 0.88 E9/L (ref 0.1–0.95)
MONOCYTES RELATIVE PERCENT: 10.8 % (ref 2–12)
NEUTROPHILS ABSOLUTE: 5.16 E9/L (ref 1.8–7.3)
NEUTROPHILS RELATIVE PERCENT: 63 % (ref 43–80)
PDW BLD-RTO: 13.7 FL (ref 11.5–15)
PLATELET # BLD: 239 E9/L (ref 130–450)
PMV BLD AUTO: 9.9 FL (ref 7–12)
POTASSIUM SERPL-SCNC: 4.2 MMOL/L (ref 3.5–5)
RBC # BLD: 5.08 E12/L (ref 3.8–5.8)
SODIUM BLD-SCNC: 141 MMOL/L (ref 132–146)
T4 FREE: 1.2 NG/DL (ref 0.93–1.7)
TOTAL PROTEIN: 7 G/DL (ref 6.4–8.3)
TRIGL SERPL-MCNC: 105 MG/DL (ref 0–149)
TSH SERPL DL<=0.05 MIU/L-ACNC: 0.1 UIU/ML (ref 0.27–4.2)
VLDLC SERPL CALC-MCNC: 21 MG/DL
WBC # BLD: 8.2 E9/L (ref 4.5–11.5)

## 2020-07-08 PROCEDURE — 36415 COLL VENOUS BLD VENIPUNCTURE: CPT

## 2020-07-08 PROCEDURE — 80053 COMPREHEN METABOLIC PANEL: CPT

## 2020-07-08 PROCEDURE — 93005 ELECTROCARDIOGRAM TRACING: CPT | Performed by: ANESTHESIOLOGY

## 2020-07-08 PROCEDURE — 84439 ASSAY OF FREE THYROXINE: CPT

## 2020-07-08 PROCEDURE — 80061 LIPID PANEL: CPT

## 2020-07-08 PROCEDURE — 84443 ASSAY THYROID STIM HORMONE: CPT

## 2020-07-08 PROCEDURE — U0003 INFECTIOUS AGENT DETECTION BY NUCLEIC ACID (DNA OR RNA); SEVERE ACUTE RESPIRATORY SYNDROME CORONAVIRUS 2 (SARS-COV-2) (CORONAVIRUS DISEASE [COVID-19]), AMPLIFIED PROBE TECHNIQUE, MAKING USE OF HIGH THROUGHPUT TECHNOLOGIES AS DESCRIBED BY CMS-2020-01-R: HCPCS

## 2020-07-08 PROCEDURE — 85025 COMPLETE CBC W/AUTO DIFF WBC: CPT

## 2020-07-08 PROCEDURE — 83036 HEMOGLOBIN GLYCOSYLATED A1C: CPT

## 2020-07-08 ASSESSMENT — PAIN DESCRIPTION - FREQUENCY: FREQUENCY: INTERMITTENT

## 2020-07-08 ASSESSMENT — PAIN DESCRIPTION - ORIENTATION: ORIENTATION: MID

## 2020-07-08 ASSESSMENT — PAIN DESCRIPTION - LOCATION: LOCATION: ABDOMEN

## 2020-07-08 ASSESSMENT — PAIN DESCRIPTION - PAIN TYPE: TYPE: ACUTE PAIN

## 2020-07-08 ASSESSMENT — PAIN DESCRIPTION - PROGRESSION: CLINICAL_PROGRESSION: GRADUALLY WORSENING

## 2020-07-08 ASSESSMENT — PAIN DESCRIPTION - DESCRIPTORS: DESCRIPTORS: DISCOMFORT;DULL

## 2020-07-08 ASSESSMENT — PAIN SCALES - GENERAL: PAINLEVEL_OUTOF10: 2

## 2020-07-08 NOTE — PROGRESS NOTES
[address] [facility]                                                                                                                    PRE OP INSTRUCTIONS FOR  Jennie Robb        Date: 7/8/2020    Date of surgery:  kirit 7/14/20 Arrival Time: Hospital will call you between 5pm and 7pm with your final arrival time for surgery    1. Do not eat or drink anything after none prior to surgery. This includes no water, chewing gum, mints or ice chips. 2. Take the following medications with a small sip of water on the morning of Surgery: take your propanoolol, levothyroxine, protonix and gabapentin with sip of water      3. Aspirin, Ibuprofen, Advil, Naproxen, Vitamin E and other Anti-inflammatory products should be stopped  before surgery  as directed by your physician. Take Tylenol only unless instructed otherwise by your surgeon. 4. Do not smoke,use illicit drugs and do not drink any alcoholic beverages 24 hours prior to surgery. 5. You may brush your teeth the morning of surgery. DO NOT SWALLOW WATER    6. You MUST make arrangements for a responsible adult to take you home after your surgery. You will not be allowed to leave alone or drive yourself home. It is strongly suggested someone stay with you the first 24 hrs. Your surgery will be cancelled if you do not have a ride home. 7. Please wear simple, loose fitting clothing to the hospital.  Haether Chan not bring valuables (money, credit cards, checkbooks, etc.) Do not wear any makeup (including no eye makeup) or nail polish on your fingers or toes. 8. DO NOT wear any jewelry or piercings on day of surgery. All body piercing jewelry must be removed. 9. Shower the night before surgery with ___Antibacterial soap /GABRIELA WIPES________      10. If you have a Living Will and Durable Power of  for Healthcare, please bring in a copy.     11. If appropriate bring crutches, inspirex, WALKER, CANE etc...    12. Notify your Surgeon if you develop any illness between now and surgery time, cough, cold, fever, sore throat, nausea, vomiting, etc.  Please notify your surgeon if you experience dizziness, shortness of breath or blurred vision between now & the time of your surgery. 13. If you have ___dentures, they will be removed before going to the OR; we will provide you a container. If you wear ___contact lenses or ___glasses, they will be removed; please bring a case for them. 14. To provide excellent care visitors will be limited to 2 in the room at any given time. 15. Please bring picture ID and insurance card. 16. Sleep apnea patients need to bring CPAP AND SETTINGS to hospital on day of surgery. 16. During flu season no children under the age of 15 are permitted in the hospital for the safety of all patients. 18. Other                 Please call AMBULATORY CARE if you have any further questions.    [address] [facility]     [address]

## 2020-07-09 LAB
EKG ATRIAL RATE: 58 BPM
EKG P AXIS: -12 DEGREES
EKG P-R INTERVAL: 162 MS
EKG Q-T INTERVAL: 442 MS
EKG QRS DURATION: 88 MS
EKG QTC CALCULATION (BAZETT): 433 MS
EKG R AXIS: -4 DEGREES
EKG T AXIS: 13 DEGREES
EKG VENTRICULAR RATE: 58 BPM
SARS-COV-2: NOT DETECTED
SOURCE: NORMAL

## 2020-07-14 ENCOUNTER — ANESTHESIA EVENT (OUTPATIENT)
Dept: OPERATING ROOM | Age: 50
End: 2020-07-14
Payer: COMMERCIAL

## 2020-07-14 ENCOUNTER — ANESTHESIA (OUTPATIENT)
Dept: OPERATING ROOM | Age: 50
End: 2020-07-14
Payer: COMMERCIAL

## 2020-07-14 ENCOUNTER — HOSPITAL ENCOUNTER (OUTPATIENT)
Age: 50
Setting detail: OUTPATIENT SURGERY
Discharge: HOME | End: 2020-07-14
Attending: SURGERY | Admitting: SURGERY
Payer: COMMERCIAL

## 2020-07-14 VITALS
SYSTOLIC BLOOD PRESSURE: 118 MMHG | DIASTOLIC BLOOD PRESSURE: 65 MMHG | HEIGHT: 76 IN | HEART RATE: 57 BPM | BODY MASS INDEX: 38.36 KG/M2 | OXYGEN SATURATION: 91 % | TEMPERATURE: 97.1 F | RESPIRATION RATE: 16 BRPM | WEIGHT: 315 LBS

## 2020-07-14 VITALS
SYSTOLIC BLOOD PRESSURE: 128 MMHG | OXYGEN SATURATION: 96 % | RESPIRATION RATE: 2 BRPM | DIASTOLIC BLOOD PRESSURE: 93 MMHG

## 2020-07-14 PROCEDURE — 2580000003 HC RX 258: Performed by: SURGERY

## 2020-07-14 PROCEDURE — 6360000002 HC RX W HCPCS: Performed by: NURSE ANESTHETIST, CERTIFIED REGISTERED

## 2020-07-14 PROCEDURE — 3700000000 HC ANESTHESIA ATTENDED CARE: Performed by: SURGERY

## 2020-07-14 PROCEDURE — C1781 MESH (IMPLANTABLE): HCPCS | Performed by: SURGERY

## 2020-07-14 PROCEDURE — 3600000019 HC SURGERY ROBOT ADDTL 15MIN: Performed by: SURGERY

## 2020-07-14 PROCEDURE — 6360000002 HC RX W HCPCS: Performed by: SURGERY

## 2020-07-14 PROCEDURE — 2500000003 HC RX 250 WO HCPCS: Performed by: NURSE ANESTHETIST, CERTIFIED REGISTERED

## 2020-07-14 PROCEDURE — 3600000009 HC SURGERY ROBOT BASE: Performed by: SURGERY

## 2020-07-14 PROCEDURE — 7100000010 HC PHASE II RECOVERY - FIRST 15 MIN: Performed by: SURGERY

## 2020-07-14 PROCEDURE — 7100000011 HC PHASE II RECOVERY - ADDTL 15 MIN: Performed by: SURGERY

## 2020-07-14 PROCEDURE — 7100000000 HC PACU RECOVERY - FIRST 15 MIN: Performed by: SURGERY

## 2020-07-14 PROCEDURE — 2500000003 HC RX 250 WO HCPCS: Performed by: SURGERY

## 2020-07-14 PROCEDURE — S2900 ROBOTIC SURGICAL SYSTEM: HCPCS | Performed by: SURGERY

## 2020-07-14 PROCEDURE — 6360000002 HC RX W HCPCS: Performed by: ANESTHESIOLOGY

## 2020-07-14 PROCEDURE — 2709999900 HC NON-CHARGEABLE SUPPLY: Performed by: SURGERY

## 2020-07-14 PROCEDURE — 3700000001 HC ADD 15 MINUTES (ANESTHESIA): Performed by: SURGERY

## 2020-07-14 PROCEDURE — 6360000002 HC RX W HCPCS

## 2020-07-14 PROCEDURE — 49653 PR LAP, VENTRAL HERNIA REPAIR,INCARCERATED: CPT | Performed by: SURGERY

## 2020-07-14 PROCEDURE — 7100000001 HC PACU RECOVERY - ADDTL 15 MIN: Performed by: SURGERY

## 2020-07-14 DEVICE — MESH HERN W7.5XL15CM INGUINAL POLYPR SYN FLAT L PORE MFIL: Type: IMPLANTABLE DEVICE | Site: ABDOMEN | Status: FUNCTIONAL

## 2020-07-14 RX ORDER — ONDANSETRON 2 MG/ML
INJECTION INTRAMUSCULAR; INTRAVENOUS PRN
Status: DISCONTINUED | OUTPATIENT
Start: 2020-07-14 | End: 2020-07-14 | Stop reason: SDUPTHER

## 2020-07-14 RX ORDER — ROCURONIUM BROMIDE 10 MG/ML
INJECTION, SOLUTION INTRAVENOUS PRN
Status: DISCONTINUED | OUTPATIENT
Start: 2020-07-14 | End: 2020-07-14 | Stop reason: SDUPTHER

## 2020-07-14 RX ORDER — SODIUM CHLORIDE 0.9 % (FLUSH) 0.9 %
10 SYRINGE (ML) INJECTION PRN
Status: DISCONTINUED | OUTPATIENT
Start: 2020-07-14 | End: 2020-07-14 | Stop reason: HOSPADM

## 2020-07-14 RX ORDER — FENTANYL CITRATE 50 UG/ML
INJECTION, SOLUTION INTRAMUSCULAR; INTRAVENOUS PRN
Status: DISCONTINUED | OUTPATIENT
Start: 2020-07-14 | End: 2020-07-14 | Stop reason: SDUPTHER

## 2020-07-14 RX ORDER — CIPROFLOXACIN 2 MG/ML
400 INJECTION, SOLUTION INTRAVENOUS ONCE
Status: COMPLETED | OUTPATIENT
Start: 2020-07-14 | End: 2020-07-14

## 2020-07-14 RX ORDER — ONDANSETRON 2 MG/ML
4 INJECTION INTRAMUSCULAR; INTRAVENOUS
Status: DISCONTINUED | OUTPATIENT
Start: 2020-07-14 | End: 2020-07-14 | Stop reason: HOSPADM

## 2020-07-14 RX ORDER — PROPOFOL 10 MG/ML
INJECTION, EMULSION INTRAVENOUS PRN
Status: DISCONTINUED | OUTPATIENT
Start: 2020-07-14 | End: 2020-07-14 | Stop reason: SDUPTHER

## 2020-07-14 RX ORDER — OXYCODONE HYDROCHLORIDE AND ACETAMINOPHEN 5; 325 MG/1; MG/1
1 TABLET ORAL EVERY 6 HOURS PRN
Qty: 18 TABLET | Refills: 0 | Status: SHIPPED | OUTPATIENT
Start: 2020-07-14 | End: 2020-07-19

## 2020-07-14 RX ORDER — MEPERIDINE HYDROCHLORIDE 25 MG/ML
12.5 INJECTION INTRAMUSCULAR; INTRAVENOUS; SUBCUTANEOUS EVERY 5 MIN PRN
Status: DISCONTINUED | OUTPATIENT
Start: 2020-07-14 | End: 2020-07-14 | Stop reason: HOSPADM

## 2020-07-14 RX ORDER — SODIUM CHLORIDE 9 MG/ML
INJECTION, SOLUTION INTRAVENOUS CONTINUOUS
Status: DISCONTINUED | OUTPATIENT
Start: 2020-07-14 | End: 2020-07-14 | Stop reason: HOSPADM

## 2020-07-14 RX ORDER — LIDOCAINE HYDROCHLORIDE 20 MG/ML
INJECTION, SOLUTION INTRAVENOUS PRN
Status: DISCONTINUED | OUTPATIENT
Start: 2020-07-14 | End: 2020-07-14 | Stop reason: SDUPTHER

## 2020-07-14 RX ORDER — DEXAMETHASONE SODIUM PHOSPHATE 10 MG/ML
INJECTION, SOLUTION INTRAMUSCULAR; INTRAVENOUS PRN
Status: DISCONTINUED | OUTPATIENT
Start: 2020-07-14 | End: 2020-07-14 | Stop reason: SDUPTHER

## 2020-07-14 RX ORDER — BUPIVACAINE HYDROCHLORIDE AND EPINEPHRINE 2.5; 5 MG/ML; UG/ML
INJECTION, SOLUTION EPIDURAL; INFILTRATION; INTRACAUDAL; PERINEURAL PRN
Status: DISCONTINUED | OUTPATIENT
Start: 2020-07-14 | End: 2020-07-14 | Stop reason: ALTCHOICE

## 2020-07-14 RX ORDER — IBUPROFEN 800 MG/1
800 TABLET ORAL EVERY 6 HOURS PRN
Qty: 90 TABLET | Refills: 1 | Status: SHIPPED | OUTPATIENT
Start: 2020-07-14 | End: 2020-09-10

## 2020-07-14 RX ORDER — GLYCOPYRROLATE 1 MG/5 ML
SYRINGE (ML) INTRAVENOUS PRN
Status: DISCONTINUED | OUTPATIENT
Start: 2020-07-14 | End: 2020-07-14 | Stop reason: SDUPTHER

## 2020-07-14 RX ORDER — SODIUM CHLORIDE 0.9 % (FLUSH) 0.9 %
10 SYRINGE (ML) INJECTION EVERY 12 HOURS SCHEDULED
Status: DISCONTINUED | OUTPATIENT
Start: 2020-07-14 | End: 2020-07-14 | Stop reason: HOSPADM

## 2020-07-14 RX ORDER — DOCUSATE SODIUM 100 MG/1
100 CAPSULE, LIQUID FILLED ORAL 2 TIMES DAILY
Qty: 30 CAPSULE | Refills: 0 | Status: SHIPPED | OUTPATIENT
Start: 2020-07-14 | End: 2020-07-29

## 2020-07-14 RX ADMIN — SODIUM CHLORIDE: 9 INJECTION, SOLUTION INTRAVENOUS at 10:44

## 2020-07-14 RX ADMIN — Medication 0.6 MG: at 12:58

## 2020-07-14 RX ADMIN — PROPOFOL 200 MG: 10 INJECTION, EMULSION INTRAVENOUS at 12:18

## 2020-07-14 RX ADMIN — HYDROMORPHONE HYDROCHLORIDE 0.25 MG: 1 INJECTION, SOLUTION INTRAMUSCULAR; INTRAVENOUS; SUBCUTANEOUS at 13:24

## 2020-07-14 RX ADMIN — LIDOCAINE HYDROCHLORIDE 80 MG: 20 INJECTION, SOLUTION INTRAVENOUS at 12:18

## 2020-07-14 RX ADMIN — HYDROMORPHONE HYDROCHLORIDE 0.25 MG: 1 INJECTION, SOLUTION INTRAMUSCULAR; INTRAVENOUS; SUBCUTANEOUS at 13:43

## 2020-07-14 RX ADMIN — SODIUM CHLORIDE: 9 INJECTION, SOLUTION INTRAVENOUS at 12:48

## 2020-07-14 RX ADMIN — Medication 3 MG: at 12:59

## 2020-07-14 RX ADMIN — FENTANYL CITRATE 200 MCG: 50 INJECTION, SOLUTION INTRAMUSCULAR; INTRAVENOUS at 12:18

## 2020-07-14 RX ADMIN — SUGAMMADEX 500 MG: 100 INJECTION, SOLUTION INTRAVENOUS at 13:05

## 2020-07-14 RX ADMIN — DEXAMETHASONE SODIUM PHOSPHATE 10 MG: 10 INJECTION, SOLUTION INTRAMUSCULAR; INTRAVENOUS at 12:22

## 2020-07-14 RX ADMIN — ONDANSETRON 4 MG: 2 INJECTION INTRAMUSCULAR; INTRAVENOUS at 12:57

## 2020-07-14 RX ADMIN — HYDROMORPHONE HYDROCHLORIDE 0.25 MG: 1 INJECTION, SOLUTION INTRAMUSCULAR; INTRAVENOUS; SUBCUTANEOUS at 14:03

## 2020-07-14 RX ADMIN — ROCURONIUM BROMIDE 50 MG: 10 SOLUTION INTRAVENOUS at 12:18

## 2020-07-14 RX ADMIN — CIPROFLOXACIN 400 MG: 2 INJECTION INTRAVENOUS at 12:22

## 2020-07-14 RX ADMIN — FENTANYL CITRATE 50 MCG: 50 INJECTION, SOLUTION INTRAMUSCULAR; INTRAVENOUS at 12:37

## 2020-07-14 RX ADMIN — HYDROMORPHONE HYDROCHLORIDE 0.25 MG: 1 INJECTION, SOLUTION INTRAMUSCULAR; INTRAVENOUS; SUBCUTANEOUS at 13:31

## 2020-07-14 ASSESSMENT — PULMONARY FUNCTION TESTS
PIF_VALUE: 28
PIF_VALUE: 28
PIF_VALUE: 27
PIF_VALUE: 15
PIF_VALUE: 6
PIF_VALUE: 28
PIF_VALUE: 1
PIF_VALUE: 27
PIF_VALUE: 17
PIF_VALUE: 22
PIF_VALUE: 28
PIF_VALUE: 0
PIF_VALUE: 17
PIF_VALUE: 27
PIF_VALUE: 28
PIF_VALUE: 2
PIF_VALUE: 30
PIF_VALUE: 29
PIF_VALUE: 18
PIF_VALUE: 27
PIF_VALUE: 1
PIF_VALUE: 28
PIF_VALUE: 2
PIF_VALUE: 18
PIF_VALUE: 27
PIF_VALUE: 27
PIF_VALUE: 17
PIF_VALUE: 17
PIF_VALUE: 28
PIF_VALUE: 17
PIF_VALUE: 28
PIF_VALUE: 34
PIF_VALUE: 27
PIF_VALUE: 18
PIF_VALUE: 1
PIF_VALUE: 27
PIF_VALUE: 28
PIF_VALUE: 21
PIF_VALUE: 18
PIF_VALUE: 0
PIF_VALUE: 17
PIF_VALUE: 12
PIF_VALUE: 19
PIF_VALUE: 28
PIF_VALUE: 28
PIF_VALUE: 1
PIF_VALUE: 2
PIF_VALUE: 3
PIF_VALUE: 17
PIF_VALUE: 0
PIF_VALUE: 26
PIF_VALUE: 28
PIF_VALUE: 26
PIF_VALUE: 3
PIF_VALUE: 27
PIF_VALUE: 28
PIF_VALUE: 67
PIF_VALUE: 18
PIF_VALUE: 28

## 2020-07-14 ASSESSMENT — PAIN SCALES - GENERAL
PAINLEVEL_OUTOF10: 4
PAINLEVEL_OUTOF10: 4
PAINLEVEL_OUTOF10: 5
PAINLEVEL_OUTOF10: 0
PAINLEVEL_OUTOF10: 5
PAINLEVEL_OUTOF10: 3
PAINLEVEL_OUTOF10: 5

## 2020-07-14 ASSESSMENT — PAIN DESCRIPTION - ONSET
ONSET: ON-GOING
ONSET: ON-GOING

## 2020-07-14 ASSESSMENT — PAIN DESCRIPTION - ORIENTATION
ORIENTATION: MID

## 2020-07-14 ASSESSMENT — PAIN DESCRIPTION - PAIN TYPE
TYPE: SURGICAL PAIN

## 2020-07-14 ASSESSMENT — PAIN DESCRIPTION - DESCRIPTORS
DESCRIPTORS: CONSTANT;DISCOMFORT;DULL
DESCRIPTORS: ACHING
DESCRIPTORS: DISCOMFORT
DESCRIPTORS: ACHING;DULL;DISCOMFORT
DESCRIPTORS: ACHING;DISCOMFORT;SORE

## 2020-07-14 ASSESSMENT — PAIN DESCRIPTION - LOCATION
LOCATION: ABDOMEN;UMBILICUS
LOCATION: ABDOMEN

## 2020-07-14 ASSESSMENT — PAIN DESCRIPTION - FREQUENCY
FREQUENCY: INTERMITTENT
FREQUENCY: CONTINUOUS
FREQUENCY: CONTINUOUS

## 2020-07-14 ASSESSMENT — PAIN DESCRIPTION - PROGRESSION: CLINICAL_PROGRESSION: GRADUALLY WORSENING

## 2020-07-14 ASSESSMENT — PAIN - FUNCTIONAL ASSESSMENT
PAIN_FUNCTIONAL_ASSESSMENT: ACTIVITIES ARE NOT PREVENTED
PAIN_FUNCTIONAL_ASSESSMENT: 0-10

## 2020-07-14 NOTE — OP NOTE
quadrant and one in the left lateral abdomen under direct visualization. The bed was fractured to separate the costal margin and ASIS. We then docked the robot and performed a medial to lateral dissection, started at the left side of the peritoneum. I was able to reduce the omentum prior to starting my dissection, dissecting the preperitoneal plane, dissecting across the midline and identified the hernia sac, completely reducing it as well as some preperitoneal fat that was incarcerated within the hernia itself. They were completely reduced. The hernia defect size was about 3 cm in diameter. I cleared off an area of about 3.5 x 6.5 inches in the preperitoneal space. I then used a  nonabsorbable permanent 6-inch barbed suture in order to reapproximate the fascial edges with the hernia defect. I completely closed the defect. I then used a piece of 3 x 6 inch Bard soft mesh, cut it to size to fit the space and inserted it into the preperitoneal space and overlap of at least 4 cm on either side as well as about 6 cm cephalad and caudad overlapping on the hernia repair, secured in place with 3-0 Vicryl interrupted stitches. It was secured into the posterior fascial sheath. I then reapproximated the peritoneum and this completely  obliterated the mesh into the preperitoneal space and there was no mesh exposed at the intra-abdominal or intraperitoneal contents. I then closed the peritoneum using a running absorbable 6-inch barbed suture until I completely closed the peritoneal defect. I then removed each one of the needles, and removed each one of the trocars, decompressed the abdomen, and reapproximated the skin incisions with 4-0 Monocryl suture and surgical glue. The patient was then awoken, extubated, and transferred to postoperative care unit in stable condition. All instrument counts, lap counts and needle counts were correct at completion of the procedure.     Radha Garcia MD, MS  Minimally Invasive and Bariatric Surgery  [phone] (p)  7/14/2020  12:58 PM

## 2020-07-14 NOTE — ANESTHESIA PRE PROCEDURE
Department of Anesthesiology  Preprocedure Note       Name:  Dustin Dillon   Age:  53 y.o.  :  1970                                          MRN:  [id number]         Date:  2020      Surgeon: Jeannine Nicole):  Gilles Maher MD    Procedure: Procedure(s): UMBILICAL HERNIA REPAIR WITH MESH, LAPAROSCOPIC, ROBOTIC XI ASSISTED    Medications prior to admission:   Prior to Admission medications    Medication Sig Start Date End Date Taking? Authorizing Provider   pantoprazole (PROTONIX) 40 MG tablet Take 1 tablet by mouth 2 times daily 20  Yes Isa Plummer DO   propranolol (INDERAL LA) 80 MG extended release capsule Take 1 capsule by mouth daily  Patient taking differently: Take 80 mg by mouth nightly  20  Yes Isa Plummer DO   gabapentin (NEURONTIN) 600 MG tablet Take 1 tablet by mouth 3 times daily. Patient taking differently: Take 600 mg by mouth 3 times daily.  TAKES 1800MG AT HS 10/30/19 10/29/20 Yes Isa Plummer DO   levothyroxine (SYNTHROID) 200 MCG tablet TAKE 1 TABLET BY MOUTH  DAILY 19  Yes Isa Plummer DO   valACYclovir (VALTREX) 1 g tablet TAKE TWO TABLETS BY MOUTH  TWICE A DAY FOR ONE DAY 20   Isa Plummer DO   hydroCHLOROthiazide (HYDRODIURIL) 25 MG tablet TAKE 1 TABLET BY MOUTH  DAILY 20   Isa Plummer DO   ibuprofen (ADVIL;MOTRIN) 800 MG tablet take 1 tablet by mouth every 6 hours if needed for pain 20   Isa Plummer DO   traZODone (DESYREL) 50 MG tablet Take 2 tablets by mouth nightly 20   Isa Plummer DO   lisinopril (PRINIVIL;ZESTRIL) 40 MG tablet Take 1 tablet by mouth daily  Patient not taking: Reported on 2020   Isa Plummer DO       Current medications:    Current Facility-Administered Medications   Medication Dose Route Frequency Provider Last Rate Last Dose    0.9 % sodium chloride infusion   Intravenous Continuous Gilles Maher  mL/hr at 20 1044      sodium chloride flush 0.9 % injection 10 mL  10 mL Intravenous 2 times per day Ezio Botello MD        sodium chloride flush 0.9 % injection 10 mL  10 mL Intravenous PRN Ezio Botello MD        ciprofloxacin (CIPRO) IVPB 400 mg  400 mg Intravenous Once Ezio Botello MD           Allergies:     Allergies   Allergen Reactions    Penicillins        Problem List:    Patient Active Problem List   Diagnosis Code    ADD (attention deficit disorder) F98.8    Benign essential HTN I10    [facility] Y56.56    Gastroesophageal reflux disease with esophagitis K21.0    Discoloration of skin of lower leg L81.9    Venous insufficiency I87.2       Past Medical History:        Diagnosis Date    Colitis, ulcerative chronic (HCC)     GERD (gastroesophageal reflux disease)     Hypertension     Thyroid disease        Past Surgical History:        Procedure Laterality Date    COLONOSCOPY      ENDOSCOPY, COLON, DIAGNOSTIC      SHOULDER SURGERY Right 1996    clean torn cartilage       Social History:    Social History     Tobacco Use    Smoking status: Never Smoker    Smokeless tobacco: Never Used   Substance Use Topics    Alcohol use: No     Comment: occasional                                 Counseling given: Not Answered      Vital Signs (Current):   Vitals:    07/14/20 1029   BP: (!) 124/59   Pulse: 60   Resp: 16   Temp: 97.5 °F (36.4 °C)   TempSrc: Temporal   SpO2: 95%   Weight: (!) 336 lb 4.8 oz (152.5 kg)   Height: 6' 4\" (1.93 m)                                              BP Readings from Last 3 Encounters:   07/14/20 (!) 124/59   07/08/20 (!) 111/57   05/11/20 135/82       NPO Status: Time of last liquid consumption: 1830                        Time of last solid consumption: 1830                        Date of last liquid consumption: 07/13/20                        Date of last solid food consumption: 07/13/20    BMI:   Wt Readings from Last 3 Encounters:   07/14/20 (!) 336 lb 4.8 oz (152.5 kg) 07/08/20 (!) 336 lb 4.8 oz (152.5 kg)   05/11/20 (!) 335 lb (152 kg)     Body mass index is 40.94 kg/m². CBC:   Lab Results   Component Value Date    WBC 8.2 07/08/2020    RBC 5.08 07/08/2020    HGB 14.6 07/08/2020    HCT 45.5 07/08/2020    MCV 89.6 07/08/2020    RDW 13.7 07/08/2020     07/08/2020       CMP:   Lab Results   Component Value Date     07/08/2020    K 4.2 07/08/2020     07/08/2020    CO2 27 07/08/2020    BUN 24 07/08/2020    CREATININE 1.3 07/08/2020    GFRAA >60 07/08/2020    LABGLOM 59 07/08/2020    GLUCOSE 96 07/08/2020    PROT 7.0 07/08/2020    CALCIUM 9.4 07/08/2020    BILITOT 0.4 07/08/2020    ALKPHOS 69 07/08/2020    AST 28 07/08/2020    ALT 28 07/08/2020       POC Tests: No results for input(s): POCGLU, POCNA, POCK, POCCL, POCBUN, POCHEMO, POCHCT in the last 72 hours.     Coags: No results found for: PROTIME, INR, APTT    HCG (If Applicable): No results found for: PREGTESTUR, PREGSERUM, HCG, HCGQUANT     ABGs: No results found for: PHART, PO2ART, CKN5KYB, KBI8HGM, BEART, T9OSBYOH     Type & Screen (If Applicable):  No results found for: LABABO, LABRH    Drug/Infectious Status (If Applicable):  No results found for: HIV, HEPCAB    COVID-19 Screening (If Applicable):   Lab Results   Component Value Date    COVID19 Not Detected 07/08/2020         Anesthesia Evaluation  Patient summary reviewed  Airway: Mallampati: II  TM distance: >3 FB   Neck ROM: full  Mouth opening: > = 3 FB Dental: normal exam         Pulmonary: breath sounds clear to auscultation                             Cardiovascular:  Exercise tolerance: good (>4 METS),   (+) hypertension: moderate,     (-) past MI and CAD    ECG reviewed  Rhythm: regular  Rate: normal                    Neuro/Psych:   (+) psychiatric history: stable with treatmentdepression/anxiety             GI/Hepatic/Renal:   (+) GERD:, PUD, morbid obesity          Endo/Other:    (+) hypothyroidism::., .    (-) diabetes mellitus, blood dyscrasia               Abdominal:   (+) obese,         Vascular:                                        Anesthesia Plan      general     ASA 3       Induction: intravenous. BIS  MIPS: Postoperative opioids intended and Prophylactic antiemetics administered. Anesthetic plan and risks discussed with patient. Plan discussed with CRNA.                   Brielle Palafox MD   7/14/2020

## 2020-07-14 NOTE — H&P
General Surgery History and Physical  [facility]    Patient's Name/Date of Birth: Shannon Sanchez / 1970    Date: July 14, 2020     Surgeon: Wally Vivar M.D.    PCP: Jesus January, DO     Chief Complaint: Abdominal hernia    HPI:   Shannon Sanchez is a 53 y.o. male who presents for evaluation of umbilical hernia. Timing is constant, radiation to midline, alleviated by none and started several months ago and severity is 7/10. he has no previous abdominal surgeries. he has no history of previous repairs of the hernia. Denies nausea, vomiting, fever, chills, SOB, chest pain, diarrheal constipation. No history of abnormal colonoscopy. They are a nonsmoker. Patient Active Problem List   Diagnosis    ADD (attention deficit disorder)    Benign essential HTN    [facility] complications ([facility]    Gastroesophageal reflux disease with esophagitis    Discoloration of skin of lower leg    Venous insufficiency       Past Medical History:   Diagnosis Date    Colitis, ulcerative chronic (HCC)     GERD (gastroesophageal reflux disease)     Hypertension     Thyroid disease        Past Surgical History:   Procedure Laterality Date    COLONOSCOPY      ENDOSCOPY, COLON, DIAGNOSTIC      SHOULDER SURGERY Right 1996    clean torn cartilage       Allergies   Allergen Reactions    Penicillins        The patient has a family history that is negative for severe cardiovascular or respiratory issues, negative for reaction to anesthesia. Time spent reviewing past medical, surgical, social and family history, vitals, nursing assessment and images. No changes from above documented history.     Social History     Socioeconomic History    Marital status:      Spouse name: Not on file    Number of children: Not on file    Years of education: Not on file    Highest education level: Not on file   Occupational History    Not on file   Social Needs    Financial resource strain: Not negative for - visual or auditory hallucinations, suicidal ideation    Physical exam:   BP (!) 124/59   Pulse 60   Temp 97.5 °F (36.4 °C) (Temporal)   Resp 16   Ht 6' 4\" (1.93 m)   Wt (!) 336 lb 4.8 oz (152.5 kg)   SpO2 95%   BMI 40.94 kg/m²   General appearance:  NAD, appears stated age  Head: NCAT, PERRLA, EOMI, red conjunctiva  Neck: supple, no masses, trachea midline  Lungs: Equal chest rise bilateral, no retractions, no wheezing  Heart: Reg rate  Abdomen: soft, obese, reducible umbilical hernia, tender, no overlying skin changes  Skin; warm and dry, no cyanosis  Gu: no cva tenderness  Extremities: atraumatic, no focal motor deficits, no open wounds  Psych: No tremor, visual hallucinations        Radiology: I reviewed relevant abdominal imaging from this admission and that available in the EMR    Assessment:  Dorothea Wells is a 53 y.o. male with umbilical hernia  Patient Active Problem List   Diagnosis    ADD (attention deficit disorder)    Benign essential HTN    Ulcerative colitis without complications (HCC)    Gastroesophageal reflux disease with esophagitis    Discoloration of skin of lower leg    Venous insufficiency         Plan:  OR for Laparoscopic robot assisted umbilical hernia repair with mesh    Discussed the risk, benefits and alternatives of surgery including wound infections, bleeding, scar, seroma, mesh infection, mesh removal and migration and recurrent hernia formation and the risks of general anesthetic including MI, CVA, sudden death or reactions to anesthetic medications. The patient understands the risks and alternatives and the possibility of converting to an open procedure. All questions were answered to the patient's satisfaction and they freely signed the consent. The patient was counseled at length about the risks of juana Covid-19 during their perioperative period and any recovery window from their procedure.   The patient was made aware that juana Covid-19 may worsen their prognosis for recovering from their procedure  and lend to a higher morbidity and/or mortality risk. All material risks, benefits, and reasonable alternatives including postponing the procedure were discussed. The patient does wish to proceed with the procedure at this time.           Christina Good MD  11:54 AM  7/14/2020

## 2020-07-29 ENCOUNTER — OFFICE VISIT (OUTPATIENT)
Dept: SURGERY | Age: 50
End: 2020-07-29

## 2020-07-29 VITALS
HEIGHT: 76 IN | BODY MASS INDEX: 38.36 KG/M2 | WEIGHT: 315 LBS | TEMPERATURE: 97.6 F | HEART RATE: 67 BPM | OXYGEN SATURATION: 95 %

## 2020-07-29 PROCEDURE — 99024 POSTOP FOLLOW-UP VISIT: CPT | Performed by: SURGERY

## 2020-07-29 RX ORDER — ASPIRIN 81 MG
TABLET, DELAYED RELEASE (ENTERIC COATED) ORAL
COMMUNITY
Start: 2020-07-14 | End: 2020-07-29 | Stop reason: SDUPTHER

## 2020-07-29 NOTE — PROGRESS NOTES
or hematuria  Musculoskeletal ROS: negative for - gait disturbance, muscle pain or muscular weakness  Psych/Neuro ROS: negative for - visual or auditory hallucinations, suicidal ideation    General appearance: AA, NAD  HEENT: NCAT, PERRLA, EOMI  Lungs: Clear, equal rise bilateral  Heart: Reg  Abdomen: soft, nondistended, nontender, incisions well healed, no signs of infection, no cellulitis, no hematoma  Ext: Atraumatic, no swelling  : No hernia         Assessment/Plan:  Marlene Martines is a 53 y.o. male 2 weeks s/p laparoscopic robot assisted hernia repair with mesh. Doing well.     Resume activity gradually   No heavy lifting more than 20lbs for 4 weeks total  Pathology reviewed and copy provided  Follow up as needed    Physician Signature: Electronically signed by Dr. Felipa Jim  [phone] (p)  7/29/2020  8:40 AM

## 2020-07-29 NOTE — LETTER
[facility]  [address] [address]  Phone: [phone]  Fax: [phone]    Florencio Jimenes MD        7/29/2020    Patient: Caro Hills   YOB: 1970   Date of Visit: 7/29/2020       To Whom it May Concern:    Caro Hills was seen in my clinic on 7/29/2020. Caro Hills recently underwent a surgical procedure/hospitalization with us on 7/14. They were seen in our office on 7/29/2020. They may return to unrestricted activity on 7/29/20. Thank you for your assistance in helping us accommodate Caro Hills in their time of recovery. If you have any questions or concerns, please don't hesitate to call.     Sincerely,      Livia Schmid MD, MS  Minimally Invasive General Surgery

## 2020-08-10 NOTE — TELEPHONE ENCOUNTER
Last Appointment   5/1/2020  Next Appointment  11/2/2020    Name from pharmacy: LEVOTHYROXINE  200MCG  TAB           Will file in chart as: levothyroxine (SYNTHROID) 200 MCG tablet          Sig: TAKE 1 TABLET BY MOUTH  DAILY     Disp:  90 tablet    Refills:  3     Start: 8/9/2020     Class: Normal     Non-formulary  For: Acquired hypothyroidism     To pharmacy: Requesting 1 year supply     Last ordered: 1 year ago by Lizzie Gomez DO

## 2020-08-11 RX ORDER — LEVOTHYROXINE SODIUM 0.2 MG/1
200 TABLET ORAL DAILY
Qty: 90 TABLET | Refills: 3 | Status: SHIPPED
Start: 2020-08-11 | End: 2020-08-12

## 2020-08-12 RX ORDER — LEVOTHYROXINE SODIUM 0.2 MG/1
200 TABLET ORAL DAILY
Qty: 90 TABLET | Refills: 1 | Status: SHIPPED
Start: 2020-08-12 | End: 2021-07-22

## 2020-08-24 RX ORDER — TRAZODONE HYDROCHLORIDE 50 MG/1
100 TABLET ORAL NIGHTLY
Qty: 60 TABLET | Refills: 1 | Status: SHIPPED
Start: 2020-08-24 | End: 2020-09-22

## 2020-08-28 RX ORDER — CARISOPRODOL 350 MG/1
TABLET ORAL
Qty: 120 TABLET | Refills: 5 | Status: SHIPPED | OUTPATIENT
Start: 2020-08-28 | End: 2021-08-28

## 2020-08-28 RX ORDER — PREDNISONE 1 MG/1
5 TABLET ORAL DAILY
Qty: 10 TABLET | Refills: 0 | Status: SHIPPED | OUTPATIENT
Start: 2020-08-28 | End: 2020-09-07

## 2020-09-10 ENCOUNTER — OFFICE VISIT (OUTPATIENT)
Dept: FAMILY MEDICINE CLINIC | Age: 50
End: 2020-09-10
Payer: COMMERCIAL

## 2020-09-10 VITALS
OXYGEN SATURATION: 98 % | RESPIRATION RATE: 18 BRPM | HEART RATE: 57 BPM | WEIGHT: 315 LBS | BODY MASS INDEX: 38.36 KG/M2 | DIASTOLIC BLOOD PRESSURE: 80 MMHG | HEIGHT: 76 IN | SYSTOLIC BLOOD PRESSURE: 118 MMHG

## 2020-09-10 PROCEDURE — 99213 OFFICE O/P EST LOW 20 MIN: CPT | Performed by: FAMILY MEDICINE

## 2020-09-10 RX ORDER — DEXAMETHASONE 4 MG/1
4 TABLET ORAL 2 TIMES DAILY WITH MEALS
Qty: 20 TABLET | Refills: 0 | Status: SHIPPED | OUTPATIENT
Start: 2020-09-10 | End: 2020-09-20

## 2020-09-10 RX ORDER — INDOMETHACIN 50 MG/1
50 CAPSULE ORAL 2 TIMES DAILY WITH MEALS
Qty: 20 CAPSULE | Refills: 5 | Status: SHIPPED
Start: 2020-09-10 | End: 2021-11-22

## 2020-09-11 ASSESSMENT — ENCOUNTER SYMPTOMS
RECTAL PAIN: 0
ABDOMINAL DISTENTION: 0
CHOKING: 0
VOICE CHANGE: 0
EYE REDNESS: 0
WHEEZING: 0
FACIAL SWELLING: 0
SHORTNESS OF BREATH: 0
TROUBLE SWALLOWING: 0
EYE DISCHARGE: 0
NAUSEA: 0
ANAL BLEEDING: 0
BLOOD IN STOOL: 0
COLOR CHANGE: 0
EYE PAIN: 0
PHOTOPHOBIA: 0
CONSTIPATION: 0
RHINORRHEA: 0
EYE ITCHING: 0
ABDOMINAL PAIN: 0
SORE THROAT: 0
SINUS PRESSURE: 0
VOMITING: 0
DIARRHEA: 0
CHEST TIGHTNESS: 0
BACK PAIN: 1
COUGH: 0
STRIDOR: 0
APNEA: 0

## 2020-09-11 NOTE — PROGRESS NOTES
Lieutenant Lucas is a 53 y.o. male  . Subjective:      Back Pain   This is a recurrent problem. The current episode started more than 1 year ago. The problem occurs daily. The problem has been waxing and waning since onset. The pain is present in the lumbar spine. The quality of the pain is described as aching. The pain does not radiate. The pain is at a severity of 6/10. The pain is moderate. The pain is the same all the time. The symptoms are aggravated by bending and sitting. Pertinent negatives include no abdominal pain, chest pain, dysuria, fever, headaches, numbness or weakness. Risk factors include obesity. He has tried muscle relaxant for the symptoms. The treatment provided moderate relief. Hypertension   This is a chronic problem. The current episode started more than 1 year ago. The problem has been waxing and waning since onset. The problem is controlled. Pertinent negatives include no chest pain, headaches, neck pain, palpitations or shortness of breath. There are no associated agents to hypertension. Risk factors for coronary artery disease include post-menopausal state. Past treatments include beta blockers. The current treatment provides moderate improvement. There are no compliance problems. Review of Systems   Constitutional: Negative for activity change, appetite change, chills, diaphoresis, fatigue, fever and unexpected weight change. HENT: Negative for congestion, dental problem, drooling, ear discharge, ear pain, facial swelling, hearing loss, mouth sores, nosebleeds, postnasal drip, rhinorrhea, sinus pressure, sneezing, sore throat, tinnitus, trouble swallowing and voice change. Eyes: Negative for photophobia, pain, discharge, redness, itching and visual disturbance. Respiratory: Negative for apnea, cough, choking, chest tightness, shortness of breath, wheezing and stridor. Cardiovascular: Negative for chest pain, palpitations and leg swelling.    Gastrointestinal: Negative for abdominal distention, abdominal pain, anal bleeding, blood in stool, constipation, diarrhea, nausea, rectal pain and vomiting. Endocrine: Negative for cold intolerance, heat intolerance, polydipsia, polyphagia and polyuria. Genitourinary: Negative for decreased urine volume, difficulty urinating, discharge, dysuria, enuresis, flank pain, frequency, genital sores, hematuria, penile pain, penile swelling, scrotal swelling, testicular pain and urgency. Musculoskeletal: Positive for arthralgias and back pain. Negative for gait problem, joint swelling, myalgias, neck pain and neck stiffness. Skin: Negative for color change, pallor, rash and wound. Allergic/Immunologic: Negative for environmental allergies, food allergies and immunocompromised state. Neurological: Negative for dizziness, tremors, seizures, syncope, facial asymmetry, speech difficulty, weakness, light-headedness, numbness and headaches. Hematological: Negative for adenopathy. Does not bruise/bleed easily. Psychiatric/Behavioral: Negative for agitation, behavioral problems, confusion, decreased concentration, dysphoric mood, hallucinations, self-injury, sleep disturbance and suicidal ideas. The patient is not nervous/anxious and is not hyperactive.         Past Medical History:   Diagnosis Date    Colitis, ulcerative chronic (HCC)     GERD (gastroesophageal reflux disease)     Hypertension     Thyroid disease        Social History     Socioeconomic History    Marital status:      Spouse name: Not on file    Number of children: Not on file    Years of education: Not on file    Highest education level: Not on file   Occupational History    Not on file   Social Needs    Financial resource strain: Not on file    Food insecurity     Worry: Not on file     Inability: Not on file    Transportation needs     Medical: Not on file     Non-medical: Not on file   Tobacco Use    Smoking status: Never Smoker    Smokeless tobacco: Never Used   Substance and Sexual Activity    Alcohol use: No     Comment: occasional     Drug use: No    Sexual activity: Yes     Partners: Female   Lifestyle    Physical activity     Days per week: Not on file     Minutes per session: Not on file    Stress: Not on file   Relationships    Social connections     Talks on phone: Not on file     Gets together: Not on file     Attends Zoroastrianism service: Not on file     Active member of club or organization: Not on file     Attends meetings of clubs or organizations: Not on file     Relationship status: Not on file    Intimate partner violence     Fear of current or ex partner: Not on file     Emotionally abused: Not on file     Physically abused: Not on file     Forced sexual activity: Not on file   Other Topics Concern    Not on file   Social History Narrative    Not on file       Family History   Problem Relation Age of Onset    Heart Disease Mother     Heart Disease Father     Arthritis Sister        Current Outpatient Medications on File Prior to Visit   Medication Sig Dispense Refill    carisoprodol (SOMA) 350 MG tablet take 1 tablet by mouth four times a day if needed for muscle spasm 120 tablet 5    traZODone (DESYREL) 50 MG tablet TAKE 2 TABLETS BY MOUTH  NIGHTLY 60 tablet 1    levothyroxine (SYNTHROID) 200 MCG tablet TAKE 1 TABLET BY MOUTH  DAILY 90 tablet 1    pantoprazole (PROTONIX) 40 MG tablet Take 1 tablet by mouth 2 times daily 60 tablet 5    valACYclovir (VALTREX) 1 g tablet TAKE TWO TABLETS BY MOUTH  TWICE A DAY FOR ONE DAY 12 tablet 5    hydroCHLOROthiazide (HYDRODIURIL) 25 MG tablet TAKE 1 TABLET BY MOUTH  DAILY 90 tablet 5    propranolol (INDERAL LA) 80 MG extended release capsule Take 1 capsule by mouth daily (Patient taking differently: Take 80 mg by mouth nightly ) 90 capsule 5    lisinopril (PRINIVIL;ZESTRIL) 40 MG tablet Take 1 tablet by mouth daily 90 tablet 4    gabapentin (NEURONTIN) 600 MG tablet Take 1 tablet by mouth 3 cervical adenopathy. Skin:     General: Skin is warm and dry. Coloration: Skin is not pale. Findings: No erythema or rash. Neurological:      Mental Status: He is alert and oriented to person, place, and time. Cranial Nerves: No cranial nerve deficit. Motor: No abnormal muscle tone. Coordination: Coordination normal.      Deep Tendon Reflexes: Reflexes are normal and symmetric. Reflexes normal.   Psychiatric:         Behavior: Behavior normal.         Thought Content: Thought content normal.         Judgment: Judgment normal.         Assessment / Plan:   Vinay Miranda was seen today for back pain. Diagnoses and all orders for this visit:    Benign essential HTN  -     TSH without Reflex; Future  -     T4, Free; Future  -     Hemoglobin A1C; Future  -     Comprehensive Metabolic Panel; Future  -     CBC Auto Differential; Future  -     Lipid Panel; Future    Acquired hypothyroidism  -     TSH without Reflex; Future  -     T4, Free; Future  -     Hemoglobin A1C; Future  -     Comprehensive Metabolic Panel; Future  -     CBC Auto Differential; Future  -     Lipid Panel; Future    Chronic bilateral low back pain without sciatica  -     indomethacin (INDOCIN) 50 MG capsule; Take 1 capsule by mouth 2 times daily (with meals) for 10 days Take with food. Acute non-recurrent maxillary sinusitis    Lateral epicondylitis of left elbow  -     indomethacin (INDOCIN) 50 MG capsule; Take 1 capsule by mouth 2 times daily (with meals) for 10 days Take with food. Lumbar disc disease with radiculopathy  -     dexamethasone (DECADRON) 4 MG tablet; Take 1 tablet by mouth 2 times daily (with meals) for 10 days  -     indomethacin (INDOCIN) 50 MG capsule; Take 1 capsule by mouth 2 times daily (with meals) for 10 days Take with food. Reviewed healthmaintenance report. Patient is aware of deficiencies and suggested preventative tests.

## 2020-09-18 ENCOUNTER — TELEPHONE (OUTPATIENT)
Dept: FAMILY MEDICINE CLINIC | Age: 50
End: 2020-09-18

## 2020-09-18 NOTE — TELEPHONE ENCOUNTER
Patient wife phoned stating that patient has c diff again and needs medication again. Please advise. Thanks.

## 2020-09-21 RX ORDER — METRONIDAZOLE 500 MG/1
500 TABLET ORAL 3 TIMES DAILY
Qty: 60 TABLET | Refills: 0 | Status: SHIPPED | OUTPATIENT
Start: 2020-09-21 | End: 2020-10-11

## 2020-09-22 RX ORDER — TRAZODONE HYDROCHLORIDE 50 MG/1
TABLET ORAL
Qty: 60 TABLET | Refills: 11 | Status: SHIPPED
Start: 2020-09-22 | End: 2021-06-21

## 2020-11-02 ENCOUNTER — OFFICE VISIT (OUTPATIENT)
Dept: FAMILY MEDICINE CLINIC | Age: 50
End: 2020-11-02
Payer: COMMERCIAL

## 2020-11-02 VITALS
RESPIRATION RATE: 18 BRPM | DIASTOLIC BLOOD PRESSURE: 70 MMHG | SYSTOLIC BLOOD PRESSURE: 110 MMHG | HEIGHT: 75 IN | TEMPERATURE: 97.3 F | BODY MASS INDEX: 39.17 KG/M2 | HEART RATE: 64 BPM | WEIGHT: 315 LBS | OXYGEN SATURATION: 95 %

## 2020-11-02 PROCEDURE — 99214 OFFICE O/P EST MOD 30 MIN: CPT | Performed by: FAMILY MEDICINE

## 2020-11-02 RX ORDER — BUSPIRONE HYDROCHLORIDE 7.5 MG/1
7.5 TABLET ORAL 2 TIMES DAILY
Qty: 60 TABLET | Refills: 5 | Status: SHIPPED
Start: 2020-11-02 | End: 2021-05-03

## 2020-11-02 ASSESSMENT — ENCOUNTER SYMPTOMS
ABDOMINAL PAIN: 0
RHINORRHEA: 0
COLOR CHANGE: 0
CONSTIPATION: 0
NAUSEA: 0
SORE THROAT: 0
SINUS PRESSURE: 0
VOICE CHANGE: 0
FACIAL SWELLING: 0
EYE REDNESS: 0
EYE ITCHING: 0
DIARRHEA: 1
ANAL BLEEDING: 0
APNEA: 0
CHEST TIGHTNESS: 0
PHOTOPHOBIA: 0
VOMITING: 0
CHOKING: 0
COUGH: 0
STRIDOR: 0
EYE PAIN: 0
BACK PAIN: 0
BLOOD IN STOOL: 0
EYE DISCHARGE: 0
RECTAL PAIN: 0
WHEEZING: 0
ABDOMINAL DISTENTION: 0
TROUBLE SWALLOWING: 0
SHORTNESS OF BREATH: 0

## 2020-11-02 NOTE — PROGRESS NOTES
sores, hematuria, penile pain, penile swelling, scrotal swelling, testicular pain and urgency. Musculoskeletal: Positive for arthralgias. Negative for back pain, gait problem, joint swelling, myalgias, neck pain and neck stiffness. Skin: Negative for color change, pallor, rash and wound. Allergic/Immunologic: Negative for environmental allergies, food allergies and immunocompromised state. Neurological: Negative for dizziness, tremors, seizures, syncope, facial asymmetry, speech difficulty, weakness, light-headedness, numbness and headaches. Hematological: Negative for adenopathy. Does not bruise/bleed easily. Psychiatric/Behavioral: Negative for agitation, behavioral problems, confusion, decreased concentration, dysphoric mood, hallucinations, self-injury, sleep disturbance and suicidal ideas. The patient is not nervous/anxious and is not hyperactive.         Past Medical History:   Diagnosis Date    Colitis, ulcerative chronic (HCC)     GERD (gastroesophageal reflux disease)     Hypertension     Thyroid disease        Social History     Socioeconomic History    Marital status:      Spouse name: Not on file    Number of children: Not on file    Years of education: Not on file    Highest education level: Not on file   Occupational History    Not on file   Social Needs    Financial resource strain: Not on file    Food insecurity     Worry: Not on file     Inability: Not on file    Transportation needs     Medical: Not on file     Non-medical: Not on file   Tobacco Use    Smoking status: Never Smoker    Smokeless tobacco: Never Used   Substance and Sexual Activity    Alcohol use: No     Comment: occasional     Drug use: No    Sexual activity: Yes     Partners: Female   Lifestyle    Physical activity     Days per week: Not on file     Minutes per session: Not on file    Stress: Not on file   Relationships    Social connections     Talks on phone: Not on file     Gets together: Not on file     Attends Denominational service: Not on file     Active member of club or organization: Not on file     Attends meetings of clubs or organizations: Not on file     Relationship status: Not on file    Intimate partner violence     Fear of current or ex partner: Not on file     Emotionally abused: Not on file     Physically abused: Not on file     Forced sexual activity: Not on file   Other Topics Concern    Not on file   Social History Narrative    Not on file       Family History   Problem Relation Age of Onset    Heart Disease Mother     Heart Disease Father     Arthritis Sister        Current Outpatient Medications on File Prior to Visit   Medication Sig Dispense Refill    traZODone (DESYREL) 50 MG tablet TAKE 2 TABLETS BY MOUTH AT  NIGHT 60 tablet 11    indomethacin (INDOCIN) 50 MG capsule Take 1 capsule by mouth 2 times daily (with meals) for 10 days Take with food. 20 capsule 5    carisoprodol (SOMA) 350 MG tablet take 1 tablet by mouth four times a day if needed for muscle spasm 120 tablet 5    levothyroxine (SYNTHROID) 200 MCG tablet TAKE 1 TABLET BY MOUTH  DAILY 90 tablet 1    pantoprazole (PROTONIX) 40 MG tablet Take 1 tablet by mouth 2 times daily 60 tablet 5    valACYclovir (VALTREX) 1 g tablet TAKE TWO TABLETS BY MOUTH  TWICE A DAY FOR ONE DAY 12 tablet 5    hydroCHLOROthiazide (HYDRODIURIL) 25 MG tablet TAKE 1 TABLET BY MOUTH  DAILY 90 tablet 5    propranolol (INDERAL LA) 80 MG extended release capsule Take 1 capsule by mouth daily (Patient taking differently: Take 80 mg by mouth nightly ) 90 capsule 5    gabapentin (NEURONTIN) 600 MG tablet Take 1 tablet by mouth 3 times daily. (Patient taking differently: Take 600 mg by mouth 3 times daily. TAKES 1800MG AT HS) 270 tablet 5     No current facility-administered medications on file prior to visit.         Allergies   Allergen Reactions    Penicillins        I have reviewed his allergies, medications, problem list, medical,social and family history and have updated as needed in the electronic medical record. Objective:     Physical Exam  Vitals signs and nursing note reviewed. Constitutional:       General: He is not in acute distress. Appearance: He is well-developed. He is not diaphoretic. HENT:      Head: Normocephalic and atraumatic. Right Ear: External ear normal.      Left Ear: External ear normal.      Nose: Nose normal.      Mouth/Throat:      Pharynx: No oropharyngeal exudate. Eyes:      General: No scleral icterus. Right eye: No discharge. Left eye: No discharge. Conjunctiva/sclera: Conjunctivae normal.      Pupils: Pupils are equal, round, and reactive to light. Neck:      Musculoskeletal: Normal range of motion and neck supple. Thyroid: No thyromegaly. Vascular: No JVD. Trachea: No tracheal deviation. Cardiovascular:      Rate and Rhythm: Normal rate and regular rhythm. Heart sounds: Normal heart sounds. No murmur. No friction rub. No gallop. Pulmonary:      Effort: Pulmonary effort is normal. No respiratory distress. Breath sounds: Normal breath sounds. No stridor. No wheezing or rales. Chest:      Chest wall: No tenderness. Abdominal:      General: Bowel sounds are normal. There is no distension. Palpations: Abdomen is soft. There is no mass. Tenderness: There is no abdominal tenderness. There is no guarding or rebound. Genitourinary:     Comments: Deferred by patient  Musculoskeletal: Normal range of motion. General: No tenderness. Lymphadenopathy:      Cervical: No cervical adenopathy. Skin:     General: Skin is warm and dry. Coloration: Skin is not pale. Findings: No erythema or rash. Neurological:      Mental Status: He is alert and oriented to person, place, and time. Cranial Nerves: No cranial nerve deficit. Motor: No abnormal muscle tone.       Coordination: Coordination normal.      Deep Tendon Reflexes: Reflexes are normal and symmetric. Reflexes normal.   Psychiatric:         Behavior: Behavior normal.         Thought Content: Thought content normal.         Judgment: Judgment normal.         Assessment / Plan:   Becca Vallecillo was seen today for hypertension. Diagnoses and all orders for this visit:    Anxiety  -     busPIRone (BUSPAR) 7.5 MG tablet; Take 1 tablet by mouth 2 times daily    Acquired hypothyroidism    Benign essential HTN    Primary insomnia    Ulcerative colitis without complications, unspecified location [facility])        Reviewed healthmaintenance report. Patient is aware of deficiencies and suggested preventative tests.

## 2020-12-02 RX ORDER — GABAPENTIN 600 MG/1
600 TABLET ORAL 3 TIMES DAILY
Qty: 270 TABLET | Refills: 3 | Status: SHIPPED
Start: 2020-12-02 | End: 2021-05-03 | Stop reason: SDUPTHER

## 2020-12-11 RX ORDER — PANTOPRAZOLE SODIUM 40 MG/1
TABLET, DELAYED RELEASE ORAL
Qty: 180 TABLET | Refills: 3 | Status: SHIPPED
Start: 2020-12-11 | End: 2021-05-03 | Stop reason: ALTCHOICE

## 2021-01-13 ENCOUNTER — APPOINTMENT (OUTPATIENT)
Dept: ULTRASOUND IMAGING | Age: 51
End: 2021-01-13
Payer: COMMERCIAL

## 2021-01-13 ENCOUNTER — HOSPITAL ENCOUNTER (EMERGENCY)
Age: 51
Discharge: HOME | End: 2021-01-13
Attending: EMERGENCY MEDICINE
Payer: COMMERCIAL

## 2021-01-13 VITALS
TEMPERATURE: 98 F | OXYGEN SATURATION: 96 % | HEIGHT: 76 IN | BODY MASS INDEX: 38.36 KG/M2 | DIASTOLIC BLOOD PRESSURE: 88 MMHG | WEIGHT: 315 LBS | HEART RATE: 74 BPM | SYSTOLIC BLOOD PRESSURE: 142 MMHG | RESPIRATION RATE: 18 BRPM

## 2021-01-13 DIAGNOSIS — R10.13 ABDOMINAL PAIN, EPIGASTRIC: ICD-10-CM

## 2021-01-13 DIAGNOSIS — R11.2 NON-INTRACTABLE VOMITING WITH NAUSEA, UNSPECIFIED VOMITING TYPE: Primary | ICD-10-CM

## 2021-01-13 LAB
ALBUMIN SERPL-MCNC: 4.5 G/DL (ref 3.5–5.2)
ALP BLD-CCNC: 73 U/L (ref 40–129)
ALT SERPL-CCNC: 19 U/L (ref 0–40)
ANION GAP SERPL CALCULATED.3IONS-SCNC: 12 MMOL/L (ref 7–16)
AST SERPL-CCNC: 25 U/L (ref 0–39)
BASOPHILS ABSOLUTE: 0.06 E9/L (ref 0–0.2)
BASOPHILS RELATIVE PERCENT: 0.4 % (ref 0–2)
BILIRUB SERPL-MCNC: 0.4 MG/DL (ref 0–1.2)
BILIRUBIN URINE: NEGATIVE
BLOOD, URINE: NEGATIVE
BUN BLDV-MCNC: 14 MG/DL (ref 6–20)
CALCIUM SERPL-MCNC: 10.2 MG/DL (ref 8.6–10.2)
CHLORIDE BLD-SCNC: 99 MMOL/L (ref 98–107)
CLARITY: CLEAR
CO2: 27 MMOL/L (ref 22–29)
COLOR: YELLOW
CREAT SERPL-MCNC: 1.1 MG/DL (ref 0.7–1.2)
EOSINOPHILS ABSOLUTE: 0.19 E9/L (ref 0.05–0.5)
EOSINOPHILS RELATIVE PERCENT: 1.3 % (ref 0–6)
GFR AFRICAN AMERICAN: >60
GFR NON-AFRICAN AMERICAN: >60 ML/MIN/1.73
GLUCOSE BLD-MCNC: 102 MG/DL (ref 74–99)
GLUCOSE URINE: NEGATIVE MG/DL
HCT VFR BLD CALC: 50 % (ref 37–54)
HEMOGLOBIN: 17.2 G/DL (ref 12.5–16.5)
IMMATURE GRANULOCYTES #: 0.07 E9/L
IMMATURE GRANULOCYTES %: 0.5 % (ref 0–5)
KETONES, URINE: ABNORMAL MG/DL
LACTIC ACID: 1.2 MMOL/L (ref 0.5–2.2)
LEUKOCYTE ESTERASE, URINE: NEGATIVE
LIPASE: 15 U/L (ref 13–60)
LYMPHOCYTES ABSOLUTE: 1.36 E9/L (ref 1.5–4)
LYMPHOCYTES RELATIVE PERCENT: 9 % (ref 20–42)
MAGNESIUM: 2 MG/DL (ref 1.6–2.6)
MCH RBC QN AUTO: 29.6 PG (ref 26–35)
MCHC RBC AUTO-ENTMCNC: 34.4 % (ref 32–34.5)
MCV RBC AUTO: 86.1 FL (ref 80–99.9)
MONOCYTES ABSOLUTE: 1.14 E9/L (ref 0.1–0.95)
MONOCYTES RELATIVE PERCENT: 7.6 % (ref 2–12)
NEUTROPHILS ABSOLUTE: 12.25 E9/L (ref 1.8–7.3)
NEUTROPHILS RELATIVE PERCENT: 81.2 % (ref 43–80)
NITRITE, URINE: NEGATIVE
PDW BLD-RTO: 12.7 FL (ref 11.5–15)
PH UA: 8 (ref 5–9)
PLATELET # BLD: 325 E9/L (ref 130–450)
PMV BLD AUTO: 9.7 FL (ref 7–12)
POTASSIUM SERPL-SCNC: 3.8 MMOL/L (ref 3.5–5)
PROTEIN UA: NEGATIVE MG/DL
RBC # BLD: 5.81 E12/L (ref 3.8–5.8)
SODIUM BLD-SCNC: 138 MMOL/L (ref 132–146)
SPECIFIC GRAVITY UA: 1.01 (ref 1–1.03)
TOTAL PROTEIN: 7.8 G/DL (ref 6.4–8.3)
TROPONIN: <0.01 NG/ML (ref 0–0.03)
UROBILINOGEN, URINE: 0.2 E.U./DL
WBC # BLD: 15.1 E9/L (ref 4.5–11.5)

## 2021-01-13 PROCEDURE — 80053 COMPREHEN METABOLIC PANEL: CPT

## 2021-01-13 PROCEDURE — 2580000003 HC RX 258: Performed by: STUDENT IN AN ORGANIZED HEALTH CARE EDUCATION/TRAINING PROGRAM

## 2021-01-13 PROCEDURE — 93005 ELECTROCARDIOGRAM TRACING: CPT | Performed by: STUDENT IN AN ORGANIZED HEALTH CARE EDUCATION/TRAINING PROGRAM

## 2021-01-13 PROCEDURE — 83690 ASSAY OF LIPASE: CPT

## 2021-01-13 PROCEDURE — 96375 TX/PRO/DX INJ NEW DRUG ADDON: CPT

## 2021-01-13 PROCEDURE — 81003 URINALYSIS AUTO W/O SCOPE: CPT

## 2021-01-13 PROCEDURE — 83735 ASSAY OF MAGNESIUM: CPT

## 2021-01-13 PROCEDURE — 96374 THER/PROPH/DIAG INJ IV PUSH: CPT

## 2021-01-13 PROCEDURE — 76705 ECHO EXAM OF ABDOMEN: CPT

## 2021-01-13 PROCEDURE — 85025 COMPLETE CBC W/AUTO DIFF WBC: CPT

## 2021-01-13 PROCEDURE — 6370000000 HC RX 637 (ALT 250 FOR IP): Performed by: STUDENT IN AN ORGANIZED HEALTH CARE EDUCATION/TRAINING PROGRAM

## 2021-01-13 PROCEDURE — 83605 ASSAY OF LACTIC ACID: CPT

## 2021-01-13 PROCEDURE — 84484 ASSAY OF TROPONIN QUANT: CPT

## 2021-01-13 PROCEDURE — 99283 EMERGENCY DEPT VISIT LOW MDM: CPT

## 2021-01-13 PROCEDURE — 6360000002 HC RX W HCPCS: Performed by: STUDENT IN AN ORGANIZED HEALTH CARE EDUCATION/TRAINING PROGRAM

## 2021-01-13 RX ORDER — SUCRALFATE 1 G/1
1 TABLET ORAL 4 TIMES DAILY
Qty: 12 TABLET | Refills: 0 | Status: SHIPPED
Start: 2021-01-13 | End: 2021-05-03 | Stop reason: ALTCHOICE

## 2021-01-13 RX ORDER — FENTANYL CITRATE 50 UG/ML
50 INJECTION, SOLUTION INTRAMUSCULAR; INTRAVENOUS ONCE
Status: COMPLETED | OUTPATIENT
Start: 2021-01-13 | End: 2021-01-13

## 2021-01-13 RX ORDER — ONDANSETRON 4 MG/1
4 TABLET, ORALLY DISINTEGRATING ORAL EVERY 12 HOURS PRN
Qty: 6 TABLET | Refills: 0 | Status: SHIPPED | OUTPATIENT
Start: 2021-01-13 | End: 2021-01-16

## 2021-01-13 RX ORDER — ONDANSETRON 2 MG/ML
4 INJECTION INTRAMUSCULAR; INTRAVENOUS ONCE
Status: COMPLETED | OUTPATIENT
Start: 2021-01-13 | End: 2021-01-13

## 2021-01-13 RX ORDER — 0.9 % SODIUM CHLORIDE 0.9 %
1000 INTRAVENOUS SOLUTION INTRAVENOUS ONCE
Status: COMPLETED | OUTPATIENT
Start: 2021-01-13 | End: 2021-01-13

## 2021-01-13 RX ADMIN — ONDANSETRON 4 MG: 2 INJECTION INTRAMUSCULAR; INTRAVENOUS at 18:16

## 2021-01-13 RX ADMIN — SODIUM CHLORIDE 1000 ML: 9 INJECTION, SOLUTION INTRAVENOUS at 18:15

## 2021-01-13 RX ADMIN — FENTANYL CITRATE 50 MCG: 50 INJECTION INTRAMUSCULAR; INTRAVENOUS at 18:16

## 2021-01-13 RX ADMIN — LIDOCAINE HYDROCHLORIDE: 20 SOLUTION ORAL; TOPICAL at 20:32

## 2021-01-13 ASSESSMENT — ENCOUNTER SYMPTOMS
TROUBLE SWALLOWING: 0
VOMITING: 0
RECTAL PAIN: 0
BACK PAIN: 0
ABDOMINAL PAIN: 1
COLOR CHANGE: 0
DIARRHEA: 0
COUGH: 0
WHEEZING: 0
VOICE CHANGE: 0
CONSTIPATION: 0
SHORTNESS OF BREATH: 0
NAUSEA: 1

## 2021-01-13 ASSESSMENT — PAIN DESCRIPTION - PAIN TYPE
TYPE: ACUTE PAIN
TYPE: ACUTE PAIN;CHRONIC PAIN

## 2021-01-13 ASSESSMENT — PAIN SCALES - GENERAL: PAINLEVEL_OUTOF10: 8

## 2021-01-13 ASSESSMENT — PAIN DESCRIPTION - ONSET: ONSET: ON-GOING

## 2021-01-13 NOTE — ED PROVIDER NOTES
[address]      Pt Name: Darnell Oliveira  MRN: [id number]  [place] 1970  Date of evaluation: 1/13/2021      CHIEF COMPLAINT       Chief Complaint   Patient presents with    Abdominal Pain     onset this morning.  Nausea        HPI  Darnell Oliveira is a 53 y.o. male with a past medical history of ulcerative colitis and GERD presents with complaints of abdominal pain that started this morning. Describes the pain as gnawing, aching, moderate in severity located at his periumbilical region. He describes as like somebody is punching him in the stomach. It is nonradiating. Exacerbated with movement. Alleviated with rest.  He has not taken any medications or measures to alleviate his symptoms. Admits to nausea and vomiting today. Denies any fevers, chills, chest pain, shortness of breath, flank pain, dysuria, hematuria, diarrhea, constipation, new rashes or sores. Except as noted above the remainder of the review of systems was reviewed and negative. Review of Systems   Constitutional: Negative for appetite change, chills, diaphoresis, fatigue, fever and unexpected weight change. HENT: Negative for trouble swallowing and voice change. Eyes: Negative for visual disturbance. Respiratory: Negative for cough, shortness of breath and wheezing. Cardiovascular: Negative for chest pain. Gastrointestinal: Positive for abdominal pain and nausea. Negative for constipation, diarrhea, rectal pain and vomiting. Endocrine: Negative for polyphagia and polyuria. Genitourinary: Negative for dysuria and frequency. Musculoskeletal: Negative for arthralgias and back pain. Skin: Negative for color change, pallor, rash and wound. Neurological: Negative for weakness and headaches. Hematological: Negative for adenopathy. Psychiatric/Behavioral: Negative for agitation.    All other systems reviewed and are negative. Physical Exam  Vitals signs and nursing note reviewed. Constitutional:       General: He is not in acute distress. Appearance: Normal appearance. He is not ill-appearing or diaphoretic. HENT:      Head: Normocephalic and atraumatic. Nose: Nose normal.   Eyes:      Extraocular Movements: Extraocular movements intact. Pupils: Pupils are equal, round, and reactive to light. Neck:      Musculoskeletal: Normal range of motion. Cardiovascular:      Rate and Rhythm: Normal rate and regular rhythm. Pulses: Normal pulses. Heart sounds: Normal heart sounds. Pulmonary:      Effort: Pulmonary effort is normal.      Breath sounds: Normal breath sounds. Abdominal:      General: Abdomen is flat. Bowel sounds are normal.      Palpations: Abdomen is soft. Tenderness: There is abdominal tenderness in the epigastric area. There is no right CVA tenderness, left CVA tenderness, guarding or rebound. Negative signs include Wolf's sign, Rovsing's sign and McBurney's sign. Hernia: No hernia is present. Musculoskeletal: Normal range of motion. Skin:     General: Skin is warm and dry. Capillary Refill: Capillary refill takes less than 2 seconds. Neurological:      General: No focal deficit present. Mental Status: He is alert and oriented to person, place, and time. Psychiatric:         Mood and Affect: Mood normal.          Procedures     MDM  Number of Diagnoses or Management Options  Abdominal pain, epigastric  Non-intractable vomiting with nausea, unspecified vomiting type  Diagnosis management comments: 46-year-old male with a past medical history of ulcerative colitis, GERD, presents with complaints of epigastric abdominal pain that started suddenly today. Describes it as gnawing, aching, and moderate in severity. Vitals within normal limits. On physical exam patient is no acute distress, speaking full sentences, alert and oriented x3.   Lungs clear to auscultation bilaterally. No wheeze rales rhonchi appreciated. Normal S1-S2. Abdomen mildly tender at the epigastrium in the right upper quadrant. Negative Wolf sign. No tenderness McBurney's point. Negative Rovsing sign. No rebound or guarding. Positive bowel sounds. No bilateral leg edema. Diagnostic labs significant for mildly elevated leukocytosis of 15.6. Possibly due to a stress reaction from patient's symptoms this morning. CBC within normal limits. Electrolytes showed no signs of electrolyte imbalance. Right upper quadrant ultrasound negative for any acute process. Lipase negative. Patient was given fentanyl, Zofran, and GI cocktail in the department with moderate relief of symptoms. He was recommended to follow-up with his primary care physician and gastroenterologist.  He is agreeable plan for discharge. Amount and/or Complexity of Data Reviewed  Decide to obtain previous medical records or to obtain history from someone other than the patient: yes           Patient is awake alert, hemodynamic stable, afebrile and in no respiratory distress. Discussed with patient plan for close outpatient follow-up with the patient's PCP as well as return precautions and the patient understands and agrees to the plan. ED Course as of Jan 16 0559 Wed Jan 13, 2021   1754 Epigastric abdominal pain started at [address] in stomach. Laying flat on back. [JV]      ED Course User Index  [JV] Perfecto De Los Santos MD               --------------------------------------------- PAST HISTORY ---------------------------------------------  Past Medical History:  has a past medical history of Colitis, ulcerative chronic ([address] GERD (gastroesophageal reflux disease), Hypertension, and Thyroid disease. Past Surgical History:  has a past surgical history that includes shoulder surgery (Right, 1996); Colonoscopy; Endoscopy, colon, diagnostic; and hernia repair (N/A, 7/14/2020).     Social History: reports that he has never smoked. He has never used smokeless tobacco. He reports that he does not drink alcohol or use drugs. Family History: family history includes Arthritis in his sister; Heart Disease in his father and mother. The patients home medications have been reviewed.     Allergies: Penicillins    -------------------------------------------------- RESULTS -------------------------------------------------  Labs:  Results for orders placed or performed during the hospital encounter of 01/13/21   CBC Auto Differential   Result Value Ref Range    WBC 15.1 (H) 4.5 - 11.5 E9/L    RBC 5.81 (H) 3.80 - 5.80 E12/L    Hemoglobin 17.2 (H) 12.5 - 16.5 g/dL    Hematocrit 50.0 37.0 - 54.0 %    MCV 86.1 80.0 - 99.9 fL    MCH 29.6 26.0 - 35.0 pg    MCHC 34.4 32.0 - 34.5 %    RDW 12.7 11.5 - 15.0 fL    Platelets 270 055 - 395 E9/L    MPV 9.7 7.0 - 12.0 fL    Neutrophils % 81.2 (H) 43.0 - 80.0 %    Immature Granulocytes % 0.5 0.0 - 5.0 %    Lymphocytes % 9.0 (L) 20.0 - 42.0 %    Monocytes % 7.6 2.0 - 12.0 %    Eosinophils % 1.3 0.0 - 6.0 %    Basophils % 0.4 0.0 - 2.0 %    Neutrophils Absolute 12.25 (H) 1.80 - 7.30 E9/L    Immature Granulocytes # 0.07 E9/L    Lymphocytes Absolute 1.36 (L) 1.50 - 4.00 E9/L    Monocytes Absolute 1.14 (H) 0.10 - 0.95 E9/L    Eosinophils Absolute 0.19 0.05 - 0.50 E9/L    Basophils Absolute 0.06 0.00 - 0.20 E9/L   Comprehensive Metabolic Panel   Result Value Ref Range    Sodium 138 132 - 146 mmol/L    Potassium 3.8 3.5 - 5.0 mmol/L    Chloride 99 98 - 107 mmol/L    CO2 27 22 - 29 mmol/L    Anion Gap 12 7 - 16 mmol/L    Glucose 102 (H) 74 - 99 mg/dL    BUN 14 6 - 20 mg/dL    CREATININE 1.1 0.7 - 1.2 mg/dL    GFR Non-African American >60 >=60 mL/min/1.73    GFR African American >60     Calcium 10.2 8.6 - 10.2 mg/dL    Total Protein 7.8 6.4 - 8.3 g/dL    Alb 4.5 3.5 - 5.2 g/dL    Total Bilirubin 0.4 0.0 - 1.2 mg/dL    Alkaline Phosphatase 73 40 - 129 U/L    ALT 19 0 - 40 U/L    AST 25 0 - 39 U/L   Magnesium   Result Value Ref Range    Magnesium 2.0 1.6 - 2.6 mg/dL   Lactic Acid, Plasma   Result Value Ref Range    Lactic Acid 1.2 0.5 - 2.2 mmol/L   Lipase   Result Value Ref Range    Lipase 15 13 - 60 U/L   Urinalysis   Result Value Ref Range    Color, UA Yellow Straw/Yellow    Clarity, UA Clear Clear    Glucose, Ur Negative Negative mg/dL    Bilirubin Urine Negative Negative    Ketones, Urine TRACE (A) Negative mg/dL    Specific Gravity, UA 1.015 1.005 - 1.030    Blood, Urine Negative Negative    pH, UA 8.0 5.0 - 9.0    Protein, UA Negative Negative mg/dL    Urobilinogen, Urine 0.2 <2.0 E.U./dL    Nitrite, Urine Negative Negative    Leukocyte Esterase, Urine Negative Negative   Troponin   Result Value Ref Range    Troponin <0.01 0.00 - 0.03 ng/mL   EKG 12 Lead   Result Value Ref Range    Ventricular Rate 62 BPM    Atrial Rate 62 BPM    P-R Interval 154 ms    QRS Duration 88 ms    Q-T Interval 418 ms    QTc Calculation (Bazett) 424 ms    P Axis -5 degrees    R Axis 0 degrees    T Axis 29 degrees     EKG read by me. Heart rate 62. Normal sinus rhythm. Normal axis deviation. No acute evaluation. No ST elevations or depressions. Stable as compared to previous EKG on 7/8/2020. Radiology:  US ABDOMEN LIMITED   Final Result   Unremarkable right upper quadrant ultrasound. Specifically, there is no   sonographic evidence of acute cholecystitis.          ------------------------- NURSING NOTES AND VITALS REVIEWED ---------------------------  Date / Time Roomed:  1/13/2021  5:41 PM  ED Bed Assignment:  CADEN/CADEN    The nursing notes within the ED encounter and vital signs as below have been reviewed.    BP (!) 142/88   Pulse 74   Temp 98 °F (36.7 °C) (Oral)   Resp 18   Ht 6' 4\" (1.93 m)   Wt (!) 330 lb (149.7 kg)   SpO2 96%   BMI 40.17 kg/m²   Oxygen Saturation Interpretation: normal      ------------------------------------------ PROGRESS NOTES ------------------------------------------    I have spoken with the patient and discussed todays results, in addition to providing specific details for the plan of care and counseling regarding the diagnosis and prognosis. Their questions are answered at this time and they are agreeable with the plan. I discussed at length with them reasons for immediate return here for re evaluation. They will followup with their PCP by calling their office tomorrow. --------------------------------- ADDITIONAL PROVIDER NOTES ---------------------------------  At this time the patient is without objective evidence of an acute process requiring hospitalization or inpatient management. They have remained hemodynamically stable throughout their entire ED visit and are stable for discharge with outpatient follow-up. The plan has been discussed in detail and they are aware of the specific conditions for emergent return, as well as the importance of follow-up. Discharge Medication List as of 1/13/2021  9:08 PM      START taking these medications    Details   sucralfate (CARAFATE) 1 GM tablet Take 1 tablet by mouth 4 times daily for 3 days, Disp-12 tablet, R-0Normal      ondansetron (ZOFRAN ODT) 4 MG disintegrating tablet Take 1 tablet by mouth every 12 hours as needed for Nausea or Vomiting, Disp-6 tablet, R-0Normal             Diagnosis:  1. Non-intractable vomiting with nausea, unspecified vomiting type    2. Abdominal pain, epigastric        Disposition:  Patient's disposition: Discharge to home  Patient's condition is stable.         Ben Paris MD  Resident  01/16/21 [id number]

## 2021-01-13 NOTE — ED NOTES
FIRST PROVIDER CONTACT ASSESSMENT NOTE      Department of Emergency Medicine   Admit Date: No admission date for patient encounter. Chief Complaint: Abdominal Pain (onset this morning. ) and Nausea      History of Present Illness:    Zay Hudson is a 53 y.o. male who presents to the ED for complaints of midepigastric abdominal pain which she states began early this morning. He reports nausea but denies any vomiting or diarrhea. Did have a normal bowel movement earlier today. Denies any shortness of breath. Denies any chest pain. He states he had a hernia repair by Dr. Zeyad Milligan in June. Does still have a gallbladder in place. States he occasionally has pain into the right flank area.         -----------------END OF FIRST PROVIDER CONTACT ASSESSMENT NOTE--------------  Electronically signed by RENETTA David CNP   DD: 1/13/21               RENETTA Mccann CNP  01/13/21 1659

## 2021-01-14 LAB
EKG ATRIAL RATE: 62 BPM
EKG P AXIS: -5 DEGREES
EKG P-R INTERVAL: 154 MS
EKG Q-T INTERVAL: 418 MS
EKG QRS DURATION: 88 MS
EKG QTC CALCULATION (BAZETT): 424 MS
EKG R AXIS: 0 DEGREES
EKG T AXIS: 29 DEGREES
EKG VENTRICULAR RATE: 62 BPM

## 2021-01-14 NOTE — ED NOTES
Call to lab to inquire of Troponin level, they are processing now.       Liliana Kapadia RN  01/13/21 2040

## 2021-02-12 DIAGNOSIS — J01.90 ACUTE BACTERIAL SINUSITIS: Primary | ICD-10-CM

## 2021-02-12 DIAGNOSIS — B96.89 ACUTE BACTERIAL SINUSITIS: Primary | ICD-10-CM

## 2021-02-12 DIAGNOSIS — U07.1 COVID-19: ICD-10-CM

## 2021-02-12 RX ORDER — AZITHROMYCIN 250 MG/1
250 TABLET, FILM COATED ORAL SEE ADMIN INSTRUCTIONS
Qty: 6 TABLET | Refills: 0 | Status: SHIPPED | OUTPATIENT
Start: 2021-02-12 | End: 2021-02-17

## 2021-02-12 RX ORDER — DEXAMETHASONE 4 MG/1
4 TABLET ORAL 2 TIMES DAILY WITH MEALS
Qty: 20 TABLET | Refills: 0 | Status: SHIPPED | OUTPATIENT
Start: 2021-02-12 | End: 2021-02-22

## 2021-05-03 ENCOUNTER — OFFICE VISIT (OUTPATIENT)
Dept: FAMILY MEDICINE CLINIC | Age: 51
End: 2021-05-03
Payer: COMMERCIAL

## 2021-05-03 VITALS
HEIGHT: 76 IN | OXYGEN SATURATION: 98 % | HEART RATE: 58 BPM | DIASTOLIC BLOOD PRESSURE: 80 MMHG | BODY MASS INDEX: 38.36 KG/M2 | WEIGHT: 315 LBS | RESPIRATION RATE: 18 BRPM | SYSTOLIC BLOOD PRESSURE: 126 MMHG

## 2021-05-03 DIAGNOSIS — I10 BENIGN ESSENTIAL HTN: ICD-10-CM

## 2021-05-03 DIAGNOSIS — F41.9 ANXIETY: ICD-10-CM

## 2021-05-03 DIAGNOSIS — M51.16 LUMBAR DISC DISEASE WITH RADICULOPATHY: ICD-10-CM

## 2021-05-03 DIAGNOSIS — M71.30 MYXOID CYST: Primary | ICD-10-CM

## 2021-05-03 DIAGNOSIS — Z12.5 SCREENING FOR MALIGNANT NEOPLASM OF PROSTATE: ICD-10-CM

## 2021-05-03 DIAGNOSIS — K21.9 GASTROESOPHAGEAL REFLUX DISEASE WITHOUT ESOPHAGITIS: ICD-10-CM

## 2021-05-03 DIAGNOSIS — Z12.11 COLON CANCER SCREENING: ICD-10-CM

## 2021-05-03 DIAGNOSIS — G25.0 BENIGN ESSENTIAL TREMOR: ICD-10-CM

## 2021-05-03 PROCEDURE — 99214 OFFICE O/P EST MOD 30 MIN: CPT | Performed by: FAMILY MEDICINE

## 2021-05-03 RX ORDER — BUSPIRONE HYDROCHLORIDE 7.5 MG/1
TABLET ORAL
Qty: 60 TABLET | Refills: 5 | Status: SHIPPED
Start: 2021-05-03 | End: 2021-12-01

## 2021-05-03 RX ORDER — OMEPRAZOLE 40 MG/1
40 CAPSULE, DELAYED RELEASE ORAL
Qty: 90 CAPSULE | Refills: 1
Start: 2021-05-03 | End: 2022-01-14 | Stop reason: SDUPTHER

## 2021-05-03 RX ORDER — GABAPENTIN 600 MG/1
600 TABLET ORAL 3 TIMES DAILY
Qty: 270 TABLET | Refills: 3 | Status: SHIPPED
Start: 2021-05-03 | End: 2022-04-01

## 2021-05-03 RX ORDER — PROPRANOLOL HYDROCHLORIDE 80 MG/1
80 CAPSULE, EXTENDED RELEASE ORAL DAILY
Qty: 90 CAPSULE | Refills: 5 | Status: SHIPPED
Start: 2021-05-03 | End: 2022-04-12

## 2021-05-03 RX ORDER — METAXALONE 800 MG/1
800 TABLET ORAL 3 TIMES DAILY
Qty: 270 TABLET | Refills: 3 | Status: SHIPPED | OUTPATIENT
Start: 2021-05-03 | End: 2021-06-02

## 2021-05-03 ASSESSMENT — PATIENT HEALTH QUESTIONNAIRE - PHQ9
SUM OF ALL RESPONSES TO PHQ QUESTIONS 1-9: 0
2. FEELING DOWN, DEPRESSED OR HOPELESS: 0
SUM OF ALL RESPONSES TO PHQ QUESTIONS 1-9: 0
SUM OF ALL RESPONSES TO PHQ9 QUESTIONS 1 & 2: 0

## 2021-05-04 ASSESSMENT — ENCOUNTER SYMPTOMS
BLOOD IN STOOL: 0
RHINORRHEA: 1
PHOTOPHOBIA: 0
STRIDOR: 0
TROUBLE SWALLOWING: 0
VOMITING: 0
EYE REDNESS: 0
EYE DISCHARGE: 0
COUGH: 0
NAUSEA: 0
APNEA: 0
COLOR CHANGE: 0
CONSTIPATION: 0
SHORTNESS OF BREATH: 0
ABDOMINAL PAIN: 0
SORE THROAT: 0
ANAL BLEEDING: 0
DIARRHEA: 0
EYE ITCHING: 0
FACIAL SWELLING: 0
WHEEZING: 0
EYE PAIN: 0
ABDOMINAL DISTENTION: 0
CHOKING: 0
VOICE CHANGE: 0
BACK PAIN: 1
CHEST TIGHTNESS: 0
SINUS PRESSURE: 0
RECTAL PAIN: 0

## 2021-05-04 NOTE — PROGRESS NOTES
Mac Dowling is a 49 y.o. male  . Subjective:      Complains of cyst to toe. Has been present for a few months. Back Pain  This is a recurrent problem. The current episode started more than 1 year ago. The problem occurs daily. The problem has been waxing and waning since onset. The pain is present in the lumbar spine. The quality of the pain is described as aching. The pain does not radiate. The pain is at a severity of 6/10. The pain is moderate. The pain is the same all the time. The symptoms are aggravated by bending and sitting. Pertinent negatives include no abdominal pain, chest pain, dysuria, fever, headaches, numbness or weakness. Risk factors include obesity. He has tried muscle relaxant for the symptoms. The treatment provided moderate relief. Hypertension  This is a chronic problem. The current episode started more than 1 year ago. The problem has been waxing and waning since onset. The problem is controlled. Pertinent negatives include no chest pain, headaches, neck pain, palpitations or shortness of breath. There are no associated agents to hypertension. Risk factors for coronary artery disease include post-menopausal state. Past treatments include beta blockers. The current treatment provides moderate improvement. There are no compliance problems. Fatigue  This is a chronic problem. The current episode started more than 1 year ago. The problem occurs daily. The problem has been waxing and waning. Associated symptoms include arthralgias and fatigue. Pertinent negatives include no abdominal pain, chest pain, chills, congestion, coughing, diaphoresis, fever, headaches, joint swelling, myalgias, nausea, neck pain, numbness, rash, sore throat, vomiting or weakness. Exacerbated by: hypothyroidism  Treatments tried: thyroid treatment  The treatment provided moderate relief. Review of Systems   Constitutional: Positive for fatigue.  Negative for activity change, appetite change, chills, Colitis, ulcerative chronic (HCC)     GERD (gastroesophageal reflux disease)     Hypertension     Thyroid disease        Social History     Socioeconomic History    Marital status:      Spouse name: Not on file    Number of children: Not on file    Years of education: Not on file    Highest education level: Not on file   Occupational History    Not on file   Social Needs    Financial resource strain: Not on file    Food insecurity     Worry: Not on file     Inability: Not on file    Transportation needs     Medical: Not on file     Non-medical: Not on file   Tobacco Use    Smoking status: Never Smoker    Smokeless tobacco: Never Used   Substance and Sexual Activity    Alcohol use: No     Comment: occasional     Drug use: No    Sexual activity: Yes     Partners: Female   Lifestyle    Physical activity     Days per week: Not on file     Minutes per session: Not on file    Stress: Not on file   Relationships    Social connections     Talks on phone: Not on file     Gets together: Not on file     Attends Zoroastrianism service: Not on file     Active member of club or organization: Not on file     Attends meetings of clubs or organizations: Not on file     Relationship status: Not on file    Intimate partner violence     Fear of current or ex partner: Not on file     Emotionally abused: Not on file     Physically abused: Not on file     Forced sexual activity: Not on file   Other Topics Concern    Not on file   Social History Narrative    Not on file       Family History   Problem Relation Age of Onset    Heart Disease Mother     Heart Disease Father     Arthritis Sister        Current Outpatient Medications on File Prior to Visit   Medication Sig Dispense Refill    traZODone (DESYREL) 50 MG tablet TAKE 2 TABLETS BY MOUTH AT  NIGHT 60 tablet 11    carisoprodol (SOMA) 350 MG tablet take 1 tablet by mouth four times a day if needed for muscle spasm 120 tablet 5    levothyroxine (SYNTHROID) 200 MCG tablet TAKE 1 TABLET BY MOUTH  DAILY 90 tablet 1    valACYclovir (VALTREX) 1 g tablet TAKE TWO TABLETS BY MOUTH  TWICE A DAY FOR ONE DAY 12 tablet 5    hydroCHLOROthiazide (HYDRODIURIL) 25 MG tablet TAKE 1 TABLET BY MOUTH  DAILY 90 tablet 5    indomethacin (INDOCIN) 50 MG capsule Take 1 capsule by mouth 2 times daily (with meals) for 10 days Take with food. 20 capsule 5     No current facility-administered medications on file prior to visit. Allergies   Allergen Reactions    Penicillins        I have reviewed his allergies, medications, problem list, medical,social and family history and have updated as needed in the electronic medical record. Objective:     Physical Exam  Vitals signs and nursing note reviewed. Constitutional:       General: He is not in acute distress. Appearance: He is well-developed. He is not diaphoretic. HENT:      Head: Normocephalic and atraumatic. Right Ear: External ear normal.      Left Ear: External ear normal.      Nose: Nose normal.      Mouth/Throat:      Pharynx: No oropharyngeal exudate. Eyes:      General: No scleral icterus. Right eye: No discharge. Left eye: No discharge. Conjunctiva/sclera: Conjunctivae normal.      Pupils: Pupils are equal, round, and reactive to light. Neck:      Musculoskeletal: Normal range of motion and neck supple. Thyroid: No thyromegaly. Vascular: No JVD. Trachea: No tracheal deviation. Cardiovascular:      Rate and Rhythm: Normal rate and regular rhythm. Heart sounds: Normal heart sounds. No murmur. No friction rub. No gallop. Pulmonary:      Effort: Pulmonary effort is normal. No respiratory distress. Breath sounds: Normal breath sounds. No stridor. No wheezing or rales. Chest:      Chest wall: No tenderness. Abdominal:      General: Bowel sounds are normal. There is no distension. Palpations: Abdomen is soft. There is no mass. Tenderness:  There is no TSH without Reflex; Future  -     Lipid Panel; Future  -     Hemoglobin A1C; Future  -     Vitamin B12 & Folate; Future  -     T4, Free; Future  -     HEPATITIS C ANTIBODY; Future    Benign essential tremor  -     propranolol (INDERAL LA) 80 MG extended release capsule; Take 1 capsule by mouth daily  -     CBC Auto Differential; Future  -     Comprehensive Metabolic Panel; Future  -     TSH without Reflex; Future  -     Lipid Panel; Future  -     Hemoglobin A1C; Future  -     Vitamin B12 & Folate; Future  -     T4, Free; Future  -     HEPATITIS C ANTIBODY; Future    Colon cancer screening  -     Cologuard (For External Results Only); Future  -     CBC Auto Differential; Future  -     Comprehensive Metabolic Panel; Future  -     TSH without Reflex; Future  -     Lipid Panel; Future  -     Hemoglobin A1C; Future  -     Vitamin B12 & Folate; Future  -     T4, Free; Future  -     HEPATITIS C ANTIBODY; Future    Screening for malignant neoplasm of prostate  -     PSA screening; Future  -     HEPATITIS C ANTIBODY; Future    Gastroesophageal reflux disease without esophagitis  -     omeprazole (PRILOSEC) 40 MG delayed release capsule; Take 1 capsule by mouth every morning (before breakfast)        Reviewed healthmaintenance report. Patient is aware of deficiencies and suggested preventative tests.

## 2021-05-28 DIAGNOSIS — Z12.11 COLON CANCER SCREENING: ICD-10-CM

## 2021-06-07 DIAGNOSIS — R19.5 POSITIVE COLORECTAL CANCER SCREENING USING COLOGUARD TEST: Primary | ICD-10-CM

## 2021-06-11 DIAGNOSIS — B00.9 HERPES SIMPLEX: ICD-10-CM

## 2021-06-15 RX ORDER — VALACYCLOVIR HYDROCHLORIDE 1 G/1
TABLET, FILM COATED ORAL
Qty: 12 TABLET | Refills: 3 | Status: SHIPPED
Start: 2021-06-15 | End: 2022-05-16

## 2021-06-18 DIAGNOSIS — F51.01 PRIMARY INSOMNIA: ICD-10-CM

## 2021-06-21 RX ORDER — TRAZODONE HYDROCHLORIDE 50 MG/1
TABLET ORAL
Qty: 60 TABLET | Refills: 11 | Status: SHIPPED
Start: 2021-06-21 | End: 2022-03-28

## 2021-07-04 DIAGNOSIS — I87.2 VENOUS INSUFFICIENCY (CHRONIC) (PERIPHERAL): ICD-10-CM

## 2021-07-07 RX ORDER — HYDROCHLOROTHIAZIDE 25 MG/1
25 TABLET ORAL DAILY
Qty: 90 TABLET | Refills: 3 | Status: SHIPPED
Start: 2021-07-07 | End: 2022-06-06

## 2021-07-21 DIAGNOSIS — E03.9 ACQUIRED HYPOTHYROIDISM: ICD-10-CM

## 2021-07-22 RX ORDER — LEVOTHYROXINE SODIUM 0.2 MG/1
200 TABLET ORAL DAILY
Qty: 90 TABLET | Refills: 3 | Status: SHIPPED
Start: 2021-07-22 | End: 2022-06-24

## 2021-08-10 ENCOUNTER — E-VISIT (OUTPATIENT)
Dept: PRIMARY CARE CLINIC | Age: 51
End: 2021-08-10
Payer: COMMERCIAL

## 2021-08-10 DIAGNOSIS — J01.01 RECURRENT MAXILLARY SINUSITIS: Primary | ICD-10-CM

## 2021-08-10 PROCEDURE — 99421 OL DIG E/M SVC 5-10 MIN: CPT | Performed by: INTERNAL MEDICINE

## 2021-08-10 RX ORDER — AZITHROMYCIN 250 MG/1
TABLET, FILM COATED ORAL
Qty: 6 TABLET | Refills: 0 | Status: SHIPPED | OUTPATIENT
Start: 2021-08-10 | End: 2021-08-20

## 2021-08-10 ASSESSMENT — LIFESTYLE VARIABLES: SMOKING_STATUS: NO, I'VE NEVER SMOKED

## 2021-10-20 DIAGNOSIS — Z12.11 COLON CANCER SCREENING: ICD-10-CM

## 2021-10-20 DIAGNOSIS — M51.16 LUMBAR DISC DISEASE WITH RADICULOPATHY: ICD-10-CM

## 2021-10-20 DIAGNOSIS — M71.30 MYXOID CYST: ICD-10-CM

## 2021-10-20 DIAGNOSIS — Z12.5 SCREENING FOR MALIGNANT NEOPLASM OF PROSTATE: ICD-10-CM

## 2021-10-20 DIAGNOSIS — I10 BENIGN ESSENTIAL HTN: ICD-10-CM

## 2021-10-20 DIAGNOSIS — G25.0 BENIGN ESSENTIAL TREMOR: ICD-10-CM

## 2021-10-20 LAB
ALBUMIN SERPL-MCNC: 4.3 G/DL (ref 3.5–5.2)
ALP BLD-CCNC: 70 U/L (ref 40–129)
ALT SERPL-CCNC: 22 U/L (ref 0–40)
ANION GAP SERPL CALCULATED.3IONS-SCNC: 15 MMOL/L (ref 7–16)
AST SERPL-CCNC: 25 U/L (ref 0–39)
BASOPHILS ABSOLUTE: 0.06 E9/L (ref 0–0.2)
BASOPHILS RELATIVE PERCENT: 0.8 % (ref 0–2)
BILIRUB SERPL-MCNC: 0.4 MG/DL (ref 0–1.2)
BUN BLDV-MCNC: 19 MG/DL (ref 6–20)
CALCIUM SERPL-MCNC: 10.2 MG/DL (ref 8.6–10.2)
CHLORIDE BLD-SCNC: 98 MMOL/L (ref 98–107)
CHOLESTEROL, TOTAL: 212 MG/DL (ref 0–199)
CO2: 26 MMOL/L (ref 22–29)
CREAT SERPL-MCNC: 1.1 MG/DL (ref 0.7–1.2)
EOSINOPHILS ABSOLUTE: 0.24 E9/L (ref 0.05–0.5)
EOSINOPHILS RELATIVE PERCENT: 3 % (ref 0–6)
FOLATE: 5.6 NG/ML (ref 4.8–24.2)
GFR AFRICAN AMERICAN: >60
GFR NON-AFRICAN AMERICAN: >60 ML/MIN/1.73
GLUCOSE BLD-MCNC: 91 MG/DL (ref 74–99)
HBA1C MFR BLD: 5.1 % (ref 4–5.6)
HCT VFR BLD CALC: 48.8 % (ref 37–54)
HDLC SERPL-MCNC: 41 MG/DL
HEMOGLOBIN: 16.3 G/DL (ref 12.5–16.5)
IMMATURE GRANULOCYTES #: 0.03 E9/L
IMMATURE GRANULOCYTES %: 0.4 % (ref 0–5)
LDL CHOLESTEROL CALCULATED: 140 MG/DL (ref 0–99)
LYMPHOCYTES ABSOLUTE: 1.99 E9/L (ref 1.5–4)
LYMPHOCYTES RELATIVE PERCENT: 24.9 % (ref 20–42)
MCH RBC QN AUTO: 29.5 PG (ref 26–35)
MCHC RBC AUTO-ENTMCNC: 33.4 % (ref 32–34.5)
MCV RBC AUTO: 88.2 FL (ref 80–99.9)
MONOCYTES ABSOLUTE: 0.89 E9/L (ref 0.1–0.95)
MONOCYTES RELATIVE PERCENT: 11.1 % (ref 2–12)
NEUTROPHILS ABSOLUTE: 4.78 E9/L (ref 1.8–7.3)
NEUTROPHILS RELATIVE PERCENT: 59.8 % (ref 43–80)
PDW BLD-RTO: 14.1 FL (ref 11.5–15)
PLATELET # BLD: 279 E9/L (ref 130–450)
PMV BLD AUTO: 10.2 FL (ref 7–12)
POTASSIUM SERPL-SCNC: 4.1 MMOL/L (ref 3.5–5)
PROSTATE SPECIFIC ANTIGEN: 0.95 NG/ML (ref 0–4)
RBC # BLD: 5.53 E12/L (ref 3.8–5.8)
SODIUM BLD-SCNC: 139 MMOL/L (ref 132–146)
T4 FREE: 1.6 NG/DL (ref 0.93–1.7)
TOTAL PROTEIN: 7.3 G/DL (ref 6.4–8.3)
TRIGL SERPL-MCNC: 154 MG/DL (ref 0–149)
TSH SERPL DL<=0.05 MIU/L-ACNC: 0.12 UIU/ML (ref 0.27–4.2)
VITAMIN B-12: 430 PG/ML (ref 211–946)
VLDLC SERPL CALC-MCNC: 31 MG/DL
WBC # BLD: 8 E9/L (ref 4.5–11.5)

## 2021-10-21 LAB — HEPATITIS C ANTIBODY INTERPRETATION: NORMAL

## 2021-11-08 DIAGNOSIS — F41.9 ANXIETY: ICD-10-CM

## 2021-11-08 RX ORDER — BUSPIRONE HYDROCHLORIDE 7.5 MG/1
TABLET ORAL
Qty: 60 TABLET | Refills: 5 | OUTPATIENT
Start: 2021-11-08

## 2021-11-22 ENCOUNTER — OFFICE VISIT (OUTPATIENT)
Dept: FAMILY MEDICINE CLINIC | Age: 51
End: 2021-11-22
Payer: COMMERCIAL

## 2021-11-22 VITALS
HEIGHT: 76 IN | WEIGHT: 315 LBS | DIASTOLIC BLOOD PRESSURE: 80 MMHG | BODY MASS INDEX: 38.36 KG/M2 | HEART RATE: 61 BPM | OXYGEN SATURATION: 97 % | RESPIRATION RATE: 18 BRPM | SYSTOLIC BLOOD PRESSURE: 118 MMHG

## 2021-11-22 DIAGNOSIS — R73.01 IFG (IMPAIRED FASTING GLUCOSE): ICD-10-CM

## 2021-11-22 DIAGNOSIS — K51.90 ULCERATIVE COLITIS WITHOUT COMPLICATIONS, UNSPECIFIED LOCATION (HCC): ICD-10-CM

## 2021-11-22 DIAGNOSIS — R53.82 CHRONIC FATIGUE: ICD-10-CM

## 2021-11-22 DIAGNOSIS — E78.2 MIXED HYPERLIPIDEMIA: ICD-10-CM

## 2021-11-22 DIAGNOSIS — I10 BENIGN ESSENTIAL HTN: Primary | ICD-10-CM

## 2021-11-22 DIAGNOSIS — E03.9 ACQUIRED HYPOTHYROIDISM: ICD-10-CM

## 2021-11-22 PROCEDURE — 99213 OFFICE O/P EST LOW 20 MIN: CPT | Performed by: FAMILY MEDICINE

## 2021-11-22 SDOH — ECONOMIC STABILITY: FOOD INSECURITY: WITHIN THE PAST 12 MONTHS, YOU WORRIED THAT YOUR FOOD WOULD RUN OUT BEFORE YOU GOT MONEY TO BUY MORE.: NEVER TRUE

## 2021-11-22 SDOH — ECONOMIC STABILITY: FOOD INSECURITY: WITHIN THE PAST 12 MONTHS, THE FOOD YOU BOUGHT JUST DIDN'T LAST AND YOU DIDN'T HAVE MONEY TO GET MORE.: NEVER TRUE

## 2021-11-22 ASSESSMENT — SOCIAL DETERMINANTS OF HEALTH (SDOH): HOW HARD IS IT FOR YOU TO PAY FOR THE VERY BASICS LIKE FOOD, HOUSING, MEDICAL CARE, AND HEATING?: NOT HARD AT ALL

## 2021-11-24 ASSESSMENT — ENCOUNTER SYMPTOMS
ABDOMINAL PAIN: 0
BACK PAIN: 0
CHEST TIGHTNESS: 0
APNEA: 0
DIARRHEA: 0
VOICE CHANGE: 0
FACIAL SWELLING: 0
COLOR CHANGE: 0
PHOTOPHOBIA: 0
WHEEZING: 0
COUGH: 0
TROUBLE SWALLOWING: 0
SINUS PRESSURE: 0
CHOKING: 0
SHORTNESS OF BREATH: 0
STRIDOR: 0
EYE ITCHING: 0
RECTAL PAIN: 0
ABDOMINAL DISTENTION: 0
EYE PAIN: 0
SORE THROAT: 0
EYE REDNESS: 0
BLOOD IN STOOL: 0
EYE DISCHARGE: 0
CONSTIPATION: 0
NAUSEA: 0
VOMITING: 0
ANAL BLEEDING: 0
RHINORRHEA: 0

## 2021-11-30 DIAGNOSIS — F41.9 ANXIETY: ICD-10-CM

## 2021-12-01 RX ORDER — BUSPIRONE HYDROCHLORIDE 7.5 MG/1
TABLET ORAL
Qty: 60 TABLET | Refills: 5 | Status: SHIPPED
Start: 2021-12-01 | End: 2022-06-06

## 2022-01-11 ENCOUNTER — E-VISIT (OUTPATIENT)
Dept: FAMILY MEDICINE CLINIC | Age: 52
End: 2022-01-11
Payer: COMMERCIAL

## 2022-01-11 DIAGNOSIS — K29.50 CHRONIC GASTRITIS WITHOUT BLEEDING, UNSPECIFIED GASTRITIS TYPE: ICD-10-CM

## 2022-01-11 DIAGNOSIS — K21.9 GASTROESOPHAGEAL REFLUX DISEASE WITHOUT ESOPHAGITIS: Primary | ICD-10-CM

## 2022-01-11 PROCEDURE — 99421 OL DIG E/M SVC 5-10 MIN: CPT | Performed by: FAMILY MEDICINE

## 2022-01-11 RX ORDER — SUCRALFATE 1 G/1
1 TABLET ORAL 4 TIMES DAILY
Qty: 120 TABLET | Refills: 3 | Status: SHIPPED
Start: 2022-01-11 | End: 2022-03-15

## 2022-01-11 NOTE — PATIENT INSTRUCTIONS
Go back on protonix. Stay on this while we start a new medication. If no significant improvement in symptoms we may want to send back to gastro for upper endoscopy.

## 2022-02-06 ENCOUNTER — E-VISIT (OUTPATIENT)
Dept: FAMILY MEDICINE CLINIC | Age: 52
End: 2022-02-06
Payer: COMMERCIAL

## 2022-02-06 DIAGNOSIS — J01.90 ACUTE BACTERIAL SINUSITIS: Primary | ICD-10-CM

## 2022-02-06 DIAGNOSIS — B96.89 ACUTE BACTERIAL SINUSITIS: Primary | ICD-10-CM

## 2022-02-06 PROCEDURE — 99421 OL DIG E/M SVC 5-10 MIN: CPT | Performed by: FAMILY MEDICINE

## 2022-02-06 RX ORDER — BROMPHENIRAMINE MALEATE, PSEUDOEPHEDRINE HYDROCHLORIDE, AND DEXTROMETHORPHAN HYDROBROMIDE 2; 30; 10 MG/5ML; MG/5ML; MG/5ML
5 SYRUP ORAL 4 TIMES DAILY PRN
Qty: 120 ML | Refills: 0 | Status: SHIPPED
Start: 2022-02-06 | End: 2022-06-28 | Stop reason: ALTCHOICE

## 2022-02-06 RX ORDER — AZITHROMYCIN 250 MG/1
250 TABLET, FILM COATED ORAL SEE ADMIN INSTRUCTIONS
Qty: 6 TABLET | Refills: 0 | Status: SHIPPED
Start: 2022-02-06 | End: 2022-06-28 | Stop reason: SDUPTHER

## 2022-02-06 ASSESSMENT — LIFESTYLE VARIABLES: SMOKING_STATUS: NO, I'VE NEVER SMOKED

## 2022-03-11 DIAGNOSIS — F51.01 PRIMARY INSOMNIA: ICD-10-CM

## 2022-03-14 RX ORDER — TRAZODONE HYDROCHLORIDE 50 MG/1
TABLET ORAL
Qty: 60 TABLET | Refills: 11 | OUTPATIENT
Start: 2022-03-14

## 2022-03-15 RX ORDER — SUCRALFATE ORAL 1 G/10ML
1 SUSPENSION ORAL 4 TIMES DAILY
Qty: 1200 ML | Refills: 3 | Status: SHIPPED | OUTPATIENT
Start: 2022-03-15

## 2022-03-25 DIAGNOSIS — F51.01 PRIMARY INSOMNIA: ICD-10-CM

## 2022-03-28 DIAGNOSIS — M51.16 LUMBAR DISC DISEASE WITH RADICULOPATHY: ICD-10-CM

## 2022-03-28 RX ORDER — TRAZODONE HYDROCHLORIDE 50 MG/1
TABLET ORAL
Qty: 60 TABLET | Refills: 11 | Status: SHIPPED | OUTPATIENT
Start: 2022-03-28

## 2022-04-01 RX ORDER — GABAPENTIN 600 MG/1
600 TABLET ORAL 3 TIMES DAILY
Qty: 270 TABLET | Refills: 3 | Status: SHIPPED | OUTPATIENT
Start: 2022-04-01 | End: 2023-04-01

## 2022-04-11 DIAGNOSIS — I10 BENIGN ESSENTIAL HTN: ICD-10-CM

## 2022-04-11 DIAGNOSIS — G25.0 BENIGN ESSENTIAL TREMOR: ICD-10-CM

## 2022-04-12 RX ORDER — PROPRANOLOL HYDROCHLORIDE 80 MG/1
80 CAPSULE, EXTENDED RELEASE ORAL DAILY
Qty: 90 CAPSULE | Refills: 3 | Status: SHIPPED
Start: 2022-04-12 | End: 2022-07-03 | Stop reason: SDUPTHER

## 2022-05-06 DIAGNOSIS — K21.9 GASTROESOPHAGEAL REFLUX DISEASE WITHOUT ESOPHAGITIS: ICD-10-CM

## 2022-05-06 DIAGNOSIS — K29.50 CHRONIC GASTRITIS WITHOUT BLEEDING, UNSPECIFIED GASTRITIS TYPE: ICD-10-CM

## 2022-05-06 RX ORDER — SUCRALFATE 1 G/1
TABLET ORAL
Qty: 120 TABLET | Refills: 3 | OUTPATIENT
Start: 2022-05-06

## 2022-05-13 DIAGNOSIS — B00.9 HERPES SIMPLEX: ICD-10-CM

## 2022-05-16 RX ORDER — VALACYCLOVIR HYDROCHLORIDE 1 G/1
TABLET, FILM COATED ORAL
Qty: 12 TABLET | Refills: 3 | Status: SHIPPED | OUTPATIENT
Start: 2022-05-16

## 2022-06-05 DIAGNOSIS — I87.2 VENOUS INSUFFICIENCY (CHRONIC) (PERIPHERAL): ICD-10-CM

## 2022-06-06 DIAGNOSIS — F41.9 ANXIETY: ICD-10-CM

## 2022-06-06 RX ORDER — HYDROCHLOROTHIAZIDE 25 MG/1
25 TABLET ORAL DAILY
Qty: 90 TABLET | Refills: 3 | Status: SHIPPED | OUTPATIENT
Start: 2022-06-06

## 2022-06-06 RX ORDER — BUSPIRONE HYDROCHLORIDE 7.5 MG/1
TABLET ORAL
Qty: 60 TABLET | Refills: 5 | Status: SHIPPED | OUTPATIENT
Start: 2022-06-06

## 2022-06-23 DIAGNOSIS — E03.9 ACQUIRED HYPOTHYROIDISM: ICD-10-CM

## 2022-06-25 RX ORDER — LEVOTHYROXINE SODIUM 0.2 MG/1
200 TABLET ORAL DAILY
Qty: 90 TABLET | Refills: 0 | Status: SHIPPED
Start: 2022-06-25 | End: 2022-06-28 | Stop reason: SDUPTHER

## 2022-06-28 ENCOUNTER — OFFICE VISIT (OUTPATIENT)
Dept: FAMILY MEDICINE CLINIC | Age: 52
End: 2022-06-28
Payer: COMMERCIAL

## 2022-06-28 VITALS
BODY MASS INDEX: 38.36 KG/M2 | RESPIRATION RATE: 18 BRPM | DIASTOLIC BLOOD PRESSURE: 78 MMHG | HEIGHT: 76 IN | OXYGEN SATURATION: 97 % | WEIGHT: 315 LBS | SYSTOLIC BLOOD PRESSURE: 120 MMHG | HEART RATE: 58 BPM

## 2022-06-28 DIAGNOSIS — K58.0 IRRITABLE BOWEL SYNDROME WITH DIARRHEA: ICD-10-CM

## 2022-06-28 DIAGNOSIS — H65.01 NON-RECURRENT ACUTE SEROUS OTITIS MEDIA OF RIGHT EAR: ICD-10-CM

## 2022-06-28 DIAGNOSIS — E03.9 ACQUIRED HYPOTHYROIDISM: ICD-10-CM

## 2022-06-28 DIAGNOSIS — K21.00 GASTROESOPHAGEAL REFLUX DISEASE WITH ESOPHAGITIS WITHOUT HEMORRHAGE: ICD-10-CM

## 2022-06-28 DIAGNOSIS — G25.0 BENIGN ESSENTIAL TREMOR: ICD-10-CM

## 2022-06-28 DIAGNOSIS — J01.90 ACUTE BACTERIAL SINUSITIS: ICD-10-CM

## 2022-06-28 DIAGNOSIS — B96.89 ACUTE BACTERIAL SINUSITIS: ICD-10-CM

## 2022-06-28 DIAGNOSIS — I10 BENIGN ESSENTIAL HTN: ICD-10-CM

## 2022-06-28 DIAGNOSIS — K51.90 ULCERATIVE COLITIS WITHOUT COMPLICATIONS, UNSPECIFIED LOCATION (HCC): Primary | ICD-10-CM

## 2022-06-28 DIAGNOSIS — R53.82 CHRONIC FATIGUE: ICD-10-CM

## 2022-06-28 DIAGNOSIS — E78.2 MIXED HYPERLIPIDEMIA: ICD-10-CM

## 2022-06-28 DIAGNOSIS — R73.01 IFG (IMPAIRED FASTING GLUCOSE): ICD-10-CM

## 2022-06-28 PROCEDURE — 99214 OFFICE O/P EST MOD 30 MIN: CPT | Performed by: FAMILY MEDICINE

## 2022-06-28 RX ORDER — BUDESONIDE 28 MG/1
AEROSOL, FOAM RECTAL
Qty: 33.4 G | Refills: 5 | Status: SHIPPED | OUTPATIENT
Start: 2022-06-28

## 2022-06-28 RX ORDER — AZITHROMYCIN 250 MG/1
250 TABLET, FILM COATED ORAL SEE ADMIN INSTRUCTIONS
Qty: 6 TABLET | Refills: 0 | Status: SHIPPED | OUTPATIENT
Start: 2022-06-28 | End: 2022-07-03

## 2022-06-28 RX ORDER — LEVOTHYROXINE SODIUM 0.2 MG/1
200 TABLET ORAL DAILY
Qty: 90 TABLET | Refills: 3 | Status: SHIPPED
Start: 2022-06-28 | End: 2022-07-03 | Stop reason: SDUPTHER

## 2022-06-28 RX ORDER — HYOSCYAMINE SULFATE 0.125 MG
0.12 TABLET ORAL EVERY 4 HOURS PRN
Qty: 120 TABLET | Refills: 3 | Status: SHIPPED | OUTPATIENT
Start: 2022-06-28

## 2022-06-28 RX ORDER — BUDESONIDE 9 MG/1
1 TABLET, FILM COATED, EXTENDED RELEASE ORAL DAILY
Qty: 30 TABLET | Refills: 5 | Status: SHIPPED | OUTPATIENT
Start: 2022-06-28

## 2022-06-28 ASSESSMENT — PATIENT HEALTH QUESTIONNAIRE - PHQ9
SUM OF ALL RESPONSES TO PHQ QUESTIONS 1-9: 0
SUM OF ALL RESPONSES TO PHQ9 QUESTIONS 1 & 2: 0
SUM OF ALL RESPONSES TO PHQ QUESTIONS 1-9: 0
1. LITTLE INTEREST OR PLEASURE IN DOING THINGS: 0
SUM OF ALL RESPONSES TO PHQ QUESTIONS 1-9: 0
SUM OF ALL RESPONSES TO PHQ QUESTIONS 1-9: 0
2. FEELING DOWN, DEPRESSED OR HOPELESS: 0

## 2022-06-28 ASSESSMENT — LIFESTYLE VARIABLES: HOW OFTEN DO YOU HAVE A DRINK CONTAINING ALCOHOL: NEVER

## 2022-06-30 ENCOUNTER — TELEPHONE (OUTPATIENT)
Dept: FAMILY MEDICINE CLINIC | Age: 52
End: 2022-06-30

## 2022-06-30 NOTE — TELEPHONE ENCOUNTER
Pharmacy wants clarification on UCERIS foam, states it normally prescribed 2mg bid for 2 weeks and then for maintenance once daily in the evening for 2 weeks.  You have directions just daily as needed

## 2022-07-03 RX ORDER — PROPRANOLOL HYDROCHLORIDE 80 MG/1
80 CAPSULE, EXTENDED RELEASE ORAL DAILY
Qty: 90 CAPSULE | Refills: 3
Start: 2022-07-03

## 2022-07-03 RX ORDER — LEVOTHYROXINE SODIUM 0.2 MG/1
200 TABLET ORAL DAILY
Qty: 90 TABLET | Refills: 3
Start: 2022-07-03

## 2022-07-03 ASSESSMENT — ENCOUNTER SYMPTOMS
CONSTIPATION: 0
NAUSEA: 0
VOICE CHANGE: 0
BLOOD IN STOOL: 0
PHOTOPHOBIA: 0
EYE REDNESS: 0
APNEA: 0
CHOKING: 0
BACK PAIN: 0
EYE ITCHING: 0
EYE PAIN: 0
VOMITING: 0
SINUS PAIN: 1
SORE THROAT: 0
ANAL BLEEDING: 0
SINUS PRESSURE: 1
COLOR CHANGE: 0
ABDOMINAL DISTENTION: 0
DIARRHEA: 1
CHEST TIGHTNESS: 0
RECTAL PAIN: 0
TROUBLE SWALLOWING: 0
ABDOMINAL PAIN: 0
FACIAL SWELLING: 0
EYE DISCHARGE: 0
STRIDOR: 0
SHORTNESS OF BREATH: 0
WHEEZING: 0
COUGH: 0
RHINORRHEA: 1

## 2022-07-03 NOTE — PROGRESS NOTES
Ruby Meyers is a 47 y.o. male  . Subjective:      Patient has been having issues with inflammatory bowel. Has an upcoming appointment with [facility] [employer] [facility] for reevaluation. Patient is due for blood work. We will attempt to get uceris covered by insurance because this helped in the past.  Patient complains of sinus pressure and ear pain. Is of right ear. Discussed need for shingles vaccine. Review of Systems   Constitutional: Negative for activity change, appetite change, chills, diaphoresis, fatigue, fever and unexpected weight change. HENT: Positive for ear pain, rhinorrhea, sinus pressure and sinus pain. Negative for congestion, dental problem, drooling, ear discharge, facial swelling, hearing loss, mouth sores, nosebleeds, postnasal drip, sneezing, sore throat, tinnitus, trouble swallowing and voice change. Eyes: Negative for photophobia, pain, discharge, redness, itching and visual disturbance. Respiratory: Negative for apnea, cough, choking, chest tightness, shortness of breath, wheezing and stridor. Cardiovascular: Negative for chest pain, palpitations and leg swelling. Gastrointestinal: Positive for diarrhea. Negative for abdominal distention, abdominal pain, anal bleeding, blood in stool, constipation, nausea, rectal pain and vomiting. Endocrine: Negative for cold intolerance, heat intolerance, polydipsia, polyphagia and polyuria. Genitourinary: Negative for decreased urine volume, difficulty urinating, dysuria, enuresis, flank pain, frequency, genital sores, hematuria, penile discharge, penile pain, penile swelling, scrotal swelling, testicular pain and urgency. Musculoskeletal: Negative for arthralgias, back pain, gait problem, joint swelling, myalgias, neck pain and neck stiffness. Skin: Negative for color change, pallor, rash and wound. Allergic/Immunologic: Negative for environmental allergies, food allergies and immunocompromised state.    Neurological: Negative for dizziness, tremors, seizures, syncope, facial asymmetry, speech difficulty, weakness, light-headedness, numbness and headaches. Hematological: Negative for adenopathy. Does not bruise/bleed easily. Psychiatric/Behavioral: Negative for agitation, behavioral problems, confusion, decreased concentration, dysphoric mood, hallucinations, self-injury, sleep disturbance and suicidal ideas. The patient is not nervous/anxious and is not hyperactive. Past Medical History:   Diagnosis Date    Colitis, ulcerative chronic (HCC)     GERD (gastroesophageal reflux disease)     Hypertension     Thyroid disease        Social History     Socioeconomic History    Marital status:      Spouse name: Not on file    Number of children: Not on file    Years of education: Not on file    Highest education level: Not on file   Occupational History    Not on file   Tobacco Use    Smoking status: Never Smoker    Smokeless tobacco: Never Used   Vaping Use    Vaping Use: Never used   Substance and Sexual Activity    Alcohol use: No     Comment: occasional     Drug use: No    Sexual activity: Yes     Partners: Female   Other Topics Concern    Not on file   Social History Narrative    Not on file     Social Determinants of Health     Financial Resource Strain: Low Risk     Difficulty of Paying Living Expenses: Not hard at all   Food Insecurity: No Food Insecurity    Worried About Running Out of Food in the Last Year: Never true    [address] in the Last Year: Never true   Transportation Needs:     Lack of Transportation (Medical): Not on file    Lack of Transportation (Non-Medical):  Not on file   Physical Activity:     Days of Exercise per Week: Not on file    Minutes of Exercise per Session: Not on file   Stress:     Feeling of Stress : Not on file   Social Connections:     Frequency of Communication with Friends and Family: Not on file    Frequency of Social Gatherings with Friends and Family: Not on file    Attends [employer] Services: Not on file    Active Member of Clubs or Organizations: Not on file    Attends Club or Organization Meetings: Not on file    Marital Status: Not on file   Intimate Partner Violence:     Fear of Current or Ex-Partner: Not on file    Emotionally Abused: Not on file    Physically Abused: Not on file    Sexually Abused: Not on file   Housing Stability:     Unable to Pay for Housing in the Last Year: Not on file    Number of Places Lived in the Last Year: Not on file    Unstable Housing in the Last Year: Not on file       Family History   Problem Relation Age of Onset    Heart Disease Mother     Heart Disease Father     Arthritis Sister        Current Outpatient Medications on File Prior to Visit   Medication Sig Dispense Refill    hydroCHLOROthiazide (HYDRODIURIL) 25 MG tablet TAKE 1 TABLET BY MOUTH  DAILY 90 tablet 3    busPIRone (BUSPAR) 7.5 MG tablet take 1 tablet by mouth twice a day 60 tablet 5    valACYclovir (VALTREX) 1 g tablet TAKE TWO TABLETS BY MOUTH  TWICE A DAY FOR ONE DAY AS  DIRECTED 12 tablet 3    propranolol (INDERAL LA) 80 MG extended release capsule TAKE 1 CAPSULE BY MOUTH  DAILY 90 capsule 3    gabapentin (NEURONTIN) 600 MG tablet TAKE 1 TABLET BY MOUTH 3  TIMES DAILY 270 tablet 3    traZODone (DESYREL) 50 MG tablet TAKE 2 TABLETS BY MOUTH AT  NIGHT 60 tablet 11    sucralfate (CARAFATE) 1 GM/10ML suspension Take 10 mLs by mouth 4 times daily 1200 mL 3    omeprazole (PRILOSEC) 40 MG delayed release capsule Take 1 capsule by mouth every morning (before breakfast) 90 capsule 3     No current facility-administered medications on file prior to visit. Allergies   Allergen Reactions    Penicillins        I have reviewed his allergies, medications, problem list, medical,social and family history and have updated as needed in the electronic medical record. Objective:     Physical Exam  Vitals and nursing note reviewed.    Constitutional: General: He is not in acute distress. Appearance: He is well-developed. He is not diaphoretic. HENT:      Head: Normocephalic and atraumatic. Right Ear: External ear normal.      Left Ear: External ear normal.      Ears:      Comments: Right ear serous otitis     Nose: Congestion and rhinorrhea present. Mouth/Throat:      Pharynx: No oropharyngeal exudate. Eyes:      General: No scleral icterus. Right eye: No discharge. Left eye: No discharge. Conjunctiva/sclera: Conjunctivae normal.      Pupils: Pupils are equal, round, and reactive to light. Neck:      Thyroid: No thyromegaly. Vascular: No JVD. Trachea: No tracheal deviation. Cardiovascular:      Rate and Rhythm: Normal rate and regular rhythm. Heart sounds: Normal heart sounds. No murmur heard. No friction rub. No gallop. Pulmonary:      Effort: Pulmonary effort is normal. No respiratory distress. Breath sounds: Normal breath sounds. No stridor. No wheezing or rales. Chest:      Chest wall: No tenderness. Abdominal:      General: Bowel sounds are normal. There is no distension. Palpations: Abdomen is soft. There is no mass. Tenderness: There is no abdominal tenderness. There is no guarding or rebound. Genitourinary:     Comments: Deferred by patient  Musculoskeletal:         General: No tenderness. Normal range of motion. Cervical back: Normal range of motion and neck supple. Lymphadenopathy:      Cervical: No cervical adenopathy. Skin:     General: Skin is warm and dry. Coloration: Skin is not pale. Findings: No erythema or rash. Neurological:      Mental Status: He is alert and oriented to person, place, and time. Cranial Nerves: No cranial nerve deficit. Motor: No abnormal muscle tone. Coordination: Coordination normal.      Deep Tendon Reflexes: Reflexes are normal and symmetric.  Reflexes normal.   Psychiatric:         Behavior: Behavior normal.         Thought Content: Thought content normal.         Judgment: Judgment normal.         Assessment / Plan:   Carlos Li was seen today for 6 month follow-up. Diagnoses and all orders for this visit:    Ulcer[facility] without complications, [facility]  -     CBC; Future  -     Comprehensive Metabolic Panel; Future  -     Budesonide ER 9 MG TB24; Take 1 tablet by mouth daily  -     Budesonide (UCERIS) 2 MG/ACT FOAM; Place rectally daily as needed    Benign essential HTN  -     CBC; Future  -     Comprehensive Metabolic Panel; Future  -     propranolol (INDERAL LA) 80 MG extended release capsule; Take 1 capsule by mouth daily    Gastroesophageal reflux disease with esophagitis without hemorrhage  -     CBC; Future  -     Comprehensive Metabolic Panel; Future    Mixed hyperlipidemia  -     CBC; Future  -     Comprehensive Metabolic Panel; Future  -     Lipid Panel; Future    Acquired hypothyroidism  -     CBC; Future  -     Comprehensive Metabolic Panel; Future  -     TSH; Future  -     T4, Free; Future  -     Discontinue: levothyroxine (SYNTHROID) 200 MCG tablet; Take 1 tablet by mouth Daily  -     levothyroxine (SYNTHROID) 200 MCG tablet; Take 1 tablet by mouth Daily    Chronic fatigue  -     CBC; Future  -     Comprehensive Metabolic Panel; Future    IFG (impaired fasting glucose)  -     CBC; Future  -     Comprehensive Metabolic Panel; Future  -     Hemoglobin A1C; Future    Irritable bowel syndrome with diarrhea  -     hyoscyamine (LEVSIN) 125 MCG tablet; Take 1 tablet by mouth every 4 hours as needed (IBS symptoms)    Acute bacterial sinusitis    Non-recurrent acute serous otitis media of right ear  -     azithromycin (ZITHROMAX) 250 MG tablet; Take 1 tablet by mouth See Admin Instructions for 5 days 500mg on day 1 followed by 250mg on days 2 - 5    Benign essential tremor  -     propranolol (INDERAL LA) 80 MG extended release capsule;  Take 1 capsule by mouth daily        Reviewed healthmaintenance report. Patient is aware of deficiencies and suggested preventative tests.

## 2022-07-17 ENCOUNTER — E-VISIT (OUTPATIENT)
Dept: FAMILY MEDICINE CLINIC | Age: 52
End: 2022-07-17
Payer: COMMERCIAL

## 2022-07-17 DIAGNOSIS — J01.00 ACUTE NON-RECURRENT MAXILLARY SINUSITIS: Primary | ICD-10-CM

## 2022-07-17 PROCEDURE — 99421 OL DIG E/M SVC 5-10 MIN: CPT | Performed by: FAMILY MEDICINE

## 2022-07-17 RX ORDER — AZITHROMYCIN 250 MG/1
250 TABLET, FILM COATED ORAL SEE ADMIN INSTRUCTIONS
Qty: 6 TABLET | Refills: 0 | Status: SHIPPED
Start: 2022-07-17 | End: 2022-07-19

## 2022-07-17 ASSESSMENT — LIFESTYLE VARIABLES: SMOKING_STATUS: NO, I'VE NEVER SMOKED

## 2022-07-19 ENCOUNTER — E-VISIT (OUTPATIENT)
Dept: FAMILY MEDICINE CLINIC | Age: 52
End: 2022-07-19
Payer: COMMERCIAL

## 2022-07-19 DIAGNOSIS — U07.1 COVID: Primary | ICD-10-CM

## 2022-07-19 PROCEDURE — 99421 OL DIG E/M SVC 5-10 MIN: CPT | Performed by: FAMILY MEDICINE

## 2022-07-19 RX ORDER — BROMPHENIRAMINE MALEATE, PSEUDOEPHEDRINE HYDROCHLORIDE, AND DEXTROMETHORPHAN HYDROBROMIDE 2; 30; 10 MG/5ML; MG/5ML; MG/5ML
5 SYRUP ORAL 4 TIMES DAILY PRN
Qty: 120 ML | Refills: 0 | Status: SHIPPED | OUTPATIENT
Start: 2022-07-19

## 2022-07-19 RX ORDER — AZITHROMYCIN 250 MG/1
250 TABLET, FILM COATED ORAL SEE ADMIN INSTRUCTIONS
Qty: 6 TABLET | Refills: 0 | Status: SHIPPED | OUTPATIENT
Start: 2022-07-19 | End: 2022-07-24

## 2022-07-19 RX ORDER — DEXAMETHASONE 4 MG/1
4 TABLET ORAL 2 TIMES DAILY WITH MEALS
Qty: 20 TABLET | Refills: 0 | Status: SHIPPED | OUTPATIENT
Start: 2022-07-19 | End: 2022-07-29

## 2022-07-19 ASSESSMENT — LIFESTYLE VARIABLES: SMOKING_STATUS: NO, I HAVE NEVER SMOKED

## 2022-12-02 ENCOUNTER — E-VISIT (OUTPATIENT)
Dept: FAMILY MEDICINE CLINIC | Age: 52
End: 2022-12-02
Payer: COMMERCIAL

## 2022-12-02 DIAGNOSIS — R19.7 DIARRHEA, UNSPECIFIED TYPE: Primary | ICD-10-CM

## 2022-12-02 PROCEDURE — 99421 OL DIG E/M SVC 5-10 MIN: CPT | Performed by: FAMILY MEDICINE

## 2022-12-04 RX ORDER — DEXAMETHASONE 4 MG/1
4 TABLET ORAL 2 TIMES DAILY WITH MEALS
Qty: 20 TABLET | Refills: 1 | Status: SHIPPED | OUTPATIENT
Start: 2022-12-04 | End: 2022-12-14

## 2022-12-04 RX ORDER — DIPHENOXYLATE HYDROCHLORIDE AND ATROPINE SULFATE 2.5; .025 MG/1; MG/1
1 TABLET ORAL 4 TIMES DAILY PRN
Qty: 90 TABLET | Refills: 0 | Status: SHIPPED | OUTPATIENT
Start: 2022-12-04 | End: 2022-12-24

## 2022-12-10 DIAGNOSIS — K21.9 GASTROESOPHAGEAL REFLUX DISEASE WITHOUT ESOPHAGITIS: ICD-10-CM

## 2022-12-14 DIAGNOSIS — K21.9 GASTROESOPHAGEAL REFLUX DISEASE WITHOUT ESOPHAGITIS: ICD-10-CM

## 2022-12-14 RX ORDER — OMEPRAZOLE 40 MG/1
CAPSULE, DELAYED RELEASE ORAL
Qty: 90 CAPSULE | Refills: 3 | Status: SHIPPED
Start: 2022-12-14 | End: 2022-12-16

## 2022-12-16 RX ORDER — OMEPRAZOLE 40 MG/1
CAPSULE, DELAYED RELEASE ORAL
Qty: 90 CAPSULE | Refills: 3 | Status: SHIPPED | OUTPATIENT
Start: 2022-12-16

## 2022-12-21 DIAGNOSIS — R73.01 IFG (IMPAIRED FASTING GLUCOSE): ICD-10-CM

## 2022-12-21 DIAGNOSIS — E03.9 ACQUIRED HYPOTHYROIDISM: ICD-10-CM

## 2022-12-21 DIAGNOSIS — I10 BENIGN ESSENTIAL HTN: ICD-10-CM

## 2022-12-21 DIAGNOSIS — K51.90 ULCERATIVE COLITIS WITHOUT COMPLICATIONS, UNSPECIFIED LOCATION (HCC): ICD-10-CM

## 2022-12-21 DIAGNOSIS — F41.9 ANXIETY: ICD-10-CM

## 2022-12-21 DIAGNOSIS — K21.00 GASTROESOPHAGEAL REFLUX DISEASE WITH ESOPHAGITIS WITHOUT HEMORRHAGE: ICD-10-CM

## 2022-12-21 DIAGNOSIS — R53.82 CHRONIC FATIGUE: ICD-10-CM

## 2022-12-21 DIAGNOSIS — E78.2 MIXED HYPERLIPIDEMIA: ICD-10-CM

## 2022-12-21 LAB
ALBUMIN SERPL-MCNC: 4 G/DL (ref 3.5–5.2)
ALP BLD-CCNC: 81 U/L (ref 40–129)
ALT SERPL-CCNC: 19 U/L (ref 0–40)
ANION GAP SERPL CALCULATED.3IONS-SCNC: 12 MMOL/L (ref 7–16)
AST SERPL-CCNC: 20 U/L (ref 0–39)
BILIRUB SERPL-MCNC: 0.2 MG/DL (ref 0–1.2)
BUN BLDV-MCNC: 15 MG/DL (ref 6–20)
CALCIUM SERPL-MCNC: 9.5 MG/DL (ref 8.6–10.2)
CHLORIDE BLD-SCNC: 102 MMOL/L (ref 98–107)
CHOLESTEROL, TOTAL: 200 MG/DL (ref 0–199)
CO2: 27 MMOL/L (ref 22–29)
CREAT SERPL-MCNC: 1.1 MG/DL (ref 0.7–1.2)
GFR SERPL CREATININE-BSD FRML MDRD: >60 ML/MIN/1.73
GLUCOSE BLD-MCNC: 88 MG/DL (ref 74–99)
HBA1C MFR BLD: 5.4 % (ref 4–5.6)
HCT VFR BLD CALC: 45.6 % (ref 37–54)
HDLC SERPL-MCNC: 41 MG/DL
HEMOGLOBIN: 14.7 G/DL (ref 12.5–16.5)
LDL CHOLESTEROL CALCULATED: 112 MG/DL (ref 0–99)
MCH RBC QN AUTO: 29.2 PG (ref 26–35)
MCHC RBC AUTO-ENTMCNC: 32.2 % (ref 32–34.5)
MCV RBC AUTO: 90.5 FL (ref 80–99.9)
PDW BLD-RTO: 13.4 FL (ref 11.5–15)
PLATELET # BLD: 290 E9/L (ref 130–450)
PMV BLD AUTO: 10.7 FL (ref 7–12)
POTASSIUM SERPL-SCNC: 4.5 MMOL/L (ref 3.5–5)
RBC # BLD: 5.04 E12/L (ref 3.8–5.8)
SODIUM BLD-SCNC: 141 MMOL/L (ref 132–146)
T4 FREE: 1.13 NG/DL (ref 0.93–1.7)
TOTAL PROTEIN: 6.7 G/DL (ref 6.4–8.3)
TRIGL SERPL-MCNC: 237 MG/DL (ref 0–149)
TSH SERPL DL<=0.05 MIU/L-ACNC: 1.55 UIU/ML (ref 0.27–4.2)
VLDLC SERPL CALC-MCNC: 47 MG/DL
WBC # BLD: 9 E9/L (ref 4.5–11.5)

## 2022-12-22 ENCOUNTER — OFFICE VISIT (OUTPATIENT)
Dept: FAMILY MEDICINE CLINIC | Age: 52
End: 2022-12-22
Payer: COMMERCIAL

## 2022-12-22 VITALS
SYSTOLIC BLOOD PRESSURE: 122 MMHG | RESPIRATION RATE: 18 BRPM | OXYGEN SATURATION: 97 % | BODY MASS INDEX: 38.36 KG/M2 | HEIGHT: 76 IN | DIASTOLIC BLOOD PRESSURE: 80 MMHG | WEIGHT: 315 LBS | HEART RATE: 59 BPM

## 2022-12-22 DIAGNOSIS — F41.9 ANXIETY: ICD-10-CM

## 2022-12-22 DIAGNOSIS — E61.1 IRON DEFICIENCY: ICD-10-CM

## 2022-12-22 DIAGNOSIS — R53.82 CHRONIC FATIGUE: ICD-10-CM

## 2022-12-22 DIAGNOSIS — K51.90 ULCERATIVE COLITIS WITHOUT COMPLICATIONS, UNSPECIFIED LOCATION (HCC): ICD-10-CM

## 2022-12-22 DIAGNOSIS — R10.9 FLANK PAIN: Primary | ICD-10-CM

## 2022-12-22 DIAGNOSIS — E03.9 ACQUIRED HYPOTHYROIDISM: ICD-10-CM

## 2022-12-22 DIAGNOSIS — R19.7 DIARRHEA, UNSPECIFIED TYPE: ICD-10-CM

## 2022-12-22 DIAGNOSIS — R10.2 PELVIC PAIN: ICD-10-CM

## 2022-12-22 DIAGNOSIS — R10.31 CHRONIC RLQ PAIN: ICD-10-CM

## 2022-12-22 DIAGNOSIS — G89.29 CHRONIC RLQ PAIN: ICD-10-CM

## 2022-12-22 DIAGNOSIS — R10.11 RUQ PAIN: ICD-10-CM

## 2022-12-22 DIAGNOSIS — E53.8 B12 DEFICIENCY: ICD-10-CM

## 2022-12-22 PROCEDURE — 3078F DIAST BP <80 MM HG: CPT | Performed by: FAMILY MEDICINE

## 2022-12-22 PROCEDURE — 99214 OFFICE O/P EST MOD 30 MIN: CPT | Performed by: FAMILY MEDICINE

## 2022-12-22 PROCEDURE — 3074F SYST BP LT 130 MM HG: CPT | Performed by: FAMILY MEDICINE

## 2022-12-22 RX ORDER — DEXAMETHASONE 4 MG/1
4 TABLET ORAL 2 TIMES DAILY WITH MEALS
Qty: 20 TABLET | Refills: 1 | Status: SHIPPED | OUTPATIENT
Start: 2022-12-22 | End: 2023-01-01

## 2022-12-22 RX ORDER — BUSPIRONE HYDROCHLORIDE 7.5 MG/1
TABLET ORAL
Qty: 60 TABLET | Refills: 5 | Status: SHIPPED | OUTPATIENT
Start: 2022-12-22

## 2022-12-22 SDOH — ECONOMIC STABILITY: FOOD INSECURITY: WITHIN THE PAST 12 MONTHS, THE FOOD YOU BOUGHT JUST DIDN'T LAST AND YOU DIDN'T HAVE MONEY TO GET MORE.: NEVER TRUE

## 2022-12-22 SDOH — ECONOMIC STABILITY: FOOD INSECURITY: WITHIN THE PAST 12 MONTHS, YOU WORRIED THAT YOUR FOOD WOULD RUN OUT BEFORE YOU GOT MONEY TO BUY MORE.: NEVER TRUE

## 2022-12-22 ASSESSMENT — SOCIAL DETERMINANTS OF HEALTH (SDOH): HOW HARD IS IT FOR YOU TO PAY FOR THE VERY BASICS LIKE FOOD, HOUSING, MEDICAL CARE, AND HEATING?: NOT VERY HARD

## 2022-12-26 RX ORDER — BUSPIRONE HYDROCHLORIDE 7.5 MG/1
TABLET ORAL
Qty: 180 TABLET | Refills: 5 | Status: SHIPPED
Start: 2022-12-26 | End: 2023-01-30 | Stop reason: SDUPTHER

## 2022-12-27 ENCOUNTER — TELEPHONE (OUTPATIENT)
Dept: FAMILY MEDICINE CLINIC | Age: 52
End: 2022-12-27

## 2022-12-27 NOTE — TELEPHONE ENCOUNTER
----- Message from Durga Bryant sent at 12/27/2022  1:56 PM EST -----  Subject: Message to Provider    QUESTIONS  Information for Provider? Pt states that he has orders for a CT scan and   he would prefer to have this done at [facility]. He states that [employer] was unable to schedule him for this location and recommended he   call the practice directly to schedule. A transfer to practice was   unsuccessful. Please return call regarding.   ---------------------------------------------------------------------------  --------------  Pablito NOWAK  [phone]; OK to leave message on voicemail  ---------------------------------------------------------------------------  --------------  SCRIPT ANSWERS  Relationship to Patient?  Self

## 2022-12-28 ENCOUNTER — E-VISIT (OUTPATIENT)
Dept: PRIMARY CARE CLINIC | Age: 52
End: 2022-12-28
Payer: COMMERCIAL

## 2022-12-28 DIAGNOSIS — J06.9 ACUTE URI: Primary | ICD-10-CM

## 2022-12-28 DIAGNOSIS — K51.30 ULCERATIVE RECTOSIGMOIDITIS WITHOUT COMPLICATION (HCC): Primary | ICD-10-CM

## 2022-12-28 PROCEDURE — 99421 OL DIG E/M SVC 5-10 MIN: CPT | Performed by: NURSE PRACTITIONER

## 2022-12-28 RX ORDER — AZITHROMYCIN 250 MG/1
TABLET, FILM COATED ORAL
Qty: 1 PACKET | Refills: 0 | Status: SHIPPED | OUTPATIENT
Start: 2022-12-28 | End: 2023-01-07

## 2022-12-28 ASSESSMENT — LIFESTYLE VARIABLES: SMOKING_STATUS: NO, I'VE NEVER SMOKED

## 2022-12-30 ENCOUNTER — HOSPITAL ENCOUNTER (OUTPATIENT)
Dept: CT IMAGING | Age: 52
Discharge: HOME | End: 2022-12-30
Payer: COMMERCIAL

## 2022-12-30 DIAGNOSIS — G89.29 CHRONIC RLQ PAIN: ICD-10-CM

## 2022-12-30 DIAGNOSIS — R19.7 DIARRHEA, UNSPECIFIED TYPE: ICD-10-CM

## 2022-12-30 DIAGNOSIS — R10.11 RUQ PAIN: ICD-10-CM

## 2022-12-30 DIAGNOSIS — R10.31 CHRONIC RLQ PAIN: ICD-10-CM

## 2022-12-30 DIAGNOSIS — R10.9 FLANK PAIN: ICD-10-CM

## 2022-12-30 PROCEDURE — 74177 CT ABD & PELVIS W/CONTRAST: CPT

## 2022-12-30 PROCEDURE — 6360000004 HC RX CONTRAST MEDICATION: Performed by: RADIOLOGY

## 2022-12-30 RX ADMIN — IOPAMIDOL 75 ML: 755 INJECTION, SOLUTION INTRAVENOUS at 13:41

## 2022-12-31 NOTE — PROGRESS NOTES
Fabián Paris is a 47 y.o. male  . Subjective:      Patient is complaining of worsening of abdominal cramps. Having more diarrhea than usual.  This alternates with periods of constipation. We will perform a KUB just to make sure there is no stool impaction or any type of obstruction. We will also set up with a CT of the abdomen. Of course if symptoms worsen he is to go to the ER. We will set up with a gastro consult to further evaluate his ulcerative colitis. Discussed vaccinations including shingles vaccine, COVID-vaccine and flu vaccine. Review of Systems   Constitutional:  Positive for fatigue. Negative for activity change, appetite change, chills, diaphoresis, fever and unexpected weight change. HENT:  Negative for congestion, dental problem, drooling, ear discharge, ear pain, facial swelling, hearing loss, mouth sores, nosebleeds, postnasal drip, rhinorrhea, sinus pressure, sneezing, sore throat, tinnitus, trouble swallowing and voice change. Eyes:  Negative for photophobia, pain, discharge, redness, itching and visual disturbance. Respiratory:  Negative for apnea, cough, choking, chest tightness, shortness of breath, wheezing and stridor. Cardiovascular:  Negative for chest pain, palpitations and leg swelling. Gastrointestinal:  Positive for abdominal distention and diarrhea. Negative for abdominal pain, anal bleeding, blood in stool, constipation, nausea, rectal pain and vomiting. Endocrine: Negative for cold intolerance, heat intolerance, polydipsia, polyphagia and polyuria. Genitourinary:  Negative for decreased urine volume, difficulty urinating, dysuria, enuresis, flank pain, frequency, genital sores, hematuria, penile discharge, penile pain, penile swelling, scrotal swelling, testicular pain and urgency. Musculoskeletal:  Negative for arthralgias, back pain, gait problem, joint swelling, myalgias, neck pain and neck stiffness.    Skin:  Negative for color change, pallor, rash and wound. Allergic/Immunologic: Negative for environmental allergies, food allergies and immunocompromised state. Neurological:  Negative for dizziness, tremors, seizures, syncope, facial asymmetry, speech difficulty, weakness, light-headedness, numbness and headaches. Hematological:  Negative for adenopathy. Does not bruise/bleed easily. Psychiatric/Behavioral:  Negative for agitation, behavioral problems, confusion, decreased concentration, dysphoric mood, hallucinations, self-injury, sleep disturbance and suicidal ideas. The patient is not nervous/anxious and is not hyperactive.       Past Medical History:   Diagnosis Date    Colitis, ulcerative chronic (HCC)     GERD (gastroesophageal reflux disease)     Hypertension     Thyroid disease        Social History     Socioeconomic History    Marital status:      Spouse name: Not on file    Number of children: Not on file    Years of education: Not on file    Highest education level: Not on file   Occupational History    Not on file   Tobacco Use    Smoking status: Never    Smokeless tobacco: Never   Vaping Use    Vaping Use: Never used   Substance and Sexual Activity    Alcohol use: No     Comment: occasional     Drug use: No    Sexual activity: Yes     Partners: Female   Other Topics Concern    Not on file   Social History Narrative    Not on file     Social Determinants of Health     Financial Resource Strain: Low Risk     Difficulty of Paying Living Expenses: Not very hard   Food Insecurity: No Food Insecurity    Worried About Running Out of Food in the Last Year: Never true    Ran Out of Food in the Last Year: Never true   Transportation Needs: Not on file   Physical Activity: Not on file   Stress: Not on file   Social Connections: Not on file   Intimate Partner Violence: Not on file   Housing Stability: Not on file       Family History   Problem Relation Age of Onset    Heart Disease Mother     Heart Disease Father     Arthritis Sister Current Outpatient Medications on File Prior to Visit   Medication Sig Dispense Refill    omeprazole (PRILOSEC) 40 MG delayed release capsule TAKE 1 CAPSULE BY MOUTH IN  THE MORNING BEFORE  BREAKFAST 90 capsule 3    levothyroxine (SYNTHROID) 200 MCG tablet Take 1 tablet by mouth Daily 90 tablet 3    propranolol (INDERAL LA) 80 MG extended release capsule Take 1 capsule by mouth daily 90 capsule 3    hyoscyamine (LEVSIN) 125 MCG tablet Take 1 tablet by mouth every 4 hours as needed (IBS symptoms) 120 tablet 3    hydroCHLOROthiazide (HYDRODIURIL) 25 MG tablet TAKE 1 TABLET BY MOUTH  DAILY 90 tablet 3    valACYclovir (VALTREX) 1 g tablet TAKE TWO TABLETS BY MOUTH  TWICE A DAY FOR ONE DAY AS  DIRECTED 12 tablet 3    gabapentin (NEURONTIN) 600 MG tablet TAKE 1 TABLET BY MOUTH 3  TIMES DAILY 270 tablet 3    traZODone (DESYREL) 50 MG tablet TAKE 2 TABLETS BY MOUTH AT  NIGHT 60 tablet 11    sucralfate (CARAFATE) 1 GM/10ML suspension Take 10 mLs by mouth 4 times daily 1200 mL 3    busPIRone (BUSPAR) 7.5 MG tablet take 1 tablet by mouth twice a day 180 tablet 5     No current facility-administered medications on file prior to visit. Allergies   Allergen Reactions    Penicillins        I have reviewed his allergies, medications, problem list, medical,social and family history and have updated as needed in the electronic medical record. Objective:     Physical Exam  Vitals and nursing note reviewed. Constitutional:       General: He is not in acute distress. Appearance: He is well-developed. He is not diaphoretic. HENT:      Head: Normocephalic and atraumatic. Right Ear: External ear normal.      Left Ear: External ear normal.      Nose: Nose normal.      Mouth/Throat:      Pharynx: No oropharyngeal exudate. Eyes:      General: No scleral icterus. Right eye: No discharge. Left eye: No discharge.       Conjunctiva/sclera: Conjunctivae normal.      Pupils: Pupils are equal, round, and reactive to light. Neck:      Thyroid: No thyromegaly. Vascular: No JVD. Trachea: No tracheal deviation. Cardiovascular:      Rate and Rhythm: Normal rate and regular rhythm. Heart sounds: Normal heart sounds. No murmur heard. No friction rub. No gallop. Pulmonary:      Effort: Pulmonary effort is normal. No respiratory distress. Breath sounds: Normal breath sounds. No stridor. No wheezing or rales. Chest:      Chest wall: No tenderness. Abdominal:      General: Bowel sounds are normal. There is no distension. Palpations: Abdomen is soft. There is no mass. Tenderness: There is no abdominal tenderness. There is no guarding or rebound. Genitourinary:     Comments: Deferred by patient   Musculoskeletal:         General: No tenderness. Normal range of motion. Cervical back: Normal range of motion and neck supple. Lymphadenopathy:      Cervical: No cervical adenopathy. Skin:     General: Skin is warm and dry. Coloration: Skin is not pale. Findings: No erythema or rash. Neurological:      Mental Status: He is alert and oriented to person, place, and time. Cranial Nerves: No cranial nerve deficit. Motor: No abnormal muscle tone. Coordination: Coordination normal.      Deep Tendon Reflexes: Reflexes are normal and symmetric. Reflexes normal.   Psychiatric:         Behavior: Behavior normal.         Thought Content: Thought content normal.         Judgment: Judgment normal.       Assessment / Plan:   Poonam Rae was seen today for 6 month follow-up. Diagnoses and all orders for this visit:    Flank pain  -     XR ABDOMEN (KUB) (SINGLE AP VIEW); Future  -     CT ABDOMEN PELVIS W IV CONTRAST Additional Contrast? None; Future    Anxiety  -     busPIRone (BUSPAR) 7.5 MG tablet; take 1 tablet by mouth twice a day    Diarrhea, unspecified type  -     dexamethasone (DECADRON) 4 MG tablet;  Take 1 tablet by mouth 2 times daily (with meals) for 10 days  - CT ABDOMEN PELVIS W IV CONTRAST Additional Contrast? None; Future  -     Amb External Referral To Gastroenterology    Chronic RLQ pain  -     CT ABDOMEN PELVIS W IV CONTRAST Additional Contrast? None; Future  -     Amb External Referral To Gastroenterology    Pelvic pain    RUQ pain  -     CT ABDOMEN PELVIS W IV CONTRAST Additional Contrast? None; Future    Ulcerative colitis without complications, unspecified location ([facility]  -     Amb External Referral To Gastroenterology  -     Sedimentation Rate; Future  -     C-Reactive Protein; Future    Iron deficiency  -     Iron and TIBC; Future    B12 deficiency  -     Vitamin B12 & Folate; Future    Acquired hypothyroidism  -     TSH; Future    Chronic fatigue  -     CBC with Auto Differential; Future  -     Comprehensive Metabolic Panel; Future  -     Iron and TIBC; Future      Reviewed healthmaintenance report. Patient is aware of deficiencies and suggested preventative tests.

## 2023-01-02 DIAGNOSIS — F51.01 PRIMARY INSOMNIA: ICD-10-CM

## 2023-01-03 RX ORDER — TRAZODONE HYDROCHLORIDE 50 MG/1
TABLET ORAL
Qty: 60 TABLET | Refills: 11 | Status: SHIPPED | OUTPATIENT
Start: 2023-01-03

## 2023-01-10 DIAGNOSIS — M51.16 LUMBAR DISC DISEASE WITH RADICULOPATHY: ICD-10-CM

## 2023-01-10 RX ORDER — GABAPENTIN 600 MG/1
600 TABLET ORAL 3 TIMES DAILY
Qty: 270 TABLET | Refills: 3 | Status: SHIPPED | OUTPATIENT
Start: 2023-01-10 | End: 2024-01-10

## 2023-01-30 DIAGNOSIS — I87.2 VENOUS INSUFFICIENCY (CHRONIC) (PERIPHERAL): ICD-10-CM

## 2023-01-30 DIAGNOSIS — F41.9 ANXIETY: ICD-10-CM

## 2023-01-30 DIAGNOSIS — E03.9 ACQUIRED HYPOTHYROIDISM: ICD-10-CM

## 2023-01-30 DIAGNOSIS — B00.9 HERPES SIMPLEX: ICD-10-CM

## 2023-01-30 DIAGNOSIS — G25.0 BENIGN ESSENTIAL TREMOR: ICD-10-CM

## 2023-01-30 DIAGNOSIS — I10 BENIGN ESSENTIAL HTN: ICD-10-CM

## 2023-01-31 RX ORDER — BUSPIRONE HYDROCHLORIDE 7.5 MG/1
7.5 TABLET ORAL 2 TIMES DAILY
Qty: 180 TABLET | Refills: 5 | Status: SHIPPED | OUTPATIENT
Start: 2023-01-31

## 2023-01-31 RX ORDER — LEVOTHYROXINE SODIUM 0.2 MG/1
200 TABLET ORAL DAILY
Qty: 90 TABLET | Refills: 3 | Status: SHIPPED | OUTPATIENT
Start: 2023-01-31

## 2023-01-31 RX ORDER — PROPRANOLOL HYDROCHLORIDE 80 MG/1
80 CAPSULE, EXTENDED RELEASE ORAL DAILY
Qty: 90 CAPSULE | Refills: 3 | Status: SHIPPED | OUTPATIENT
Start: 2023-01-31

## 2023-01-31 RX ORDER — VALACYCLOVIR HYDROCHLORIDE 1 G/1
TABLET, FILM COATED ORAL
Qty: 12 TABLET | Refills: 3 | Status: SHIPPED | OUTPATIENT
Start: 2023-01-31

## 2023-01-31 RX ORDER — HYDROCHLOROTHIAZIDE 25 MG/1
25 TABLET ORAL DAILY
Qty: 90 TABLET | Refills: 3 | Status: SHIPPED | OUTPATIENT
Start: 2023-01-31

## 2023-02-20 DIAGNOSIS — R19.7 DIARRHEA, UNSPECIFIED TYPE: ICD-10-CM

## 2023-02-20 RX ORDER — DEXAMETHASONE 4 MG/1
TABLET ORAL
Qty: 20 TABLET | Refills: 1 | OUTPATIENT
Start: 2023-02-20

## 2023-03-27 ENCOUNTER — E-VISIT (OUTPATIENT)
Dept: PRIMARY CARE CLINIC | Age: 53
End: 2023-03-27
Payer: COMMERCIAL

## 2023-03-27 DIAGNOSIS — J01.90 ACUTE NON-RECURRENT SINUSITIS, UNSPECIFIED LOCATION: Primary | ICD-10-CM

## 2023-03-27 PROCEDURE — 99422 OL DIG E/M SVC 11-20 MIN: CPT | Performed by: NURSE PRACTITIONER

## 2023-03-27 RX ORDER — AZITHROMYCIN 250 MG/1
250 TABLET, FILM COATED ORAL SEE ADMIN INSTRUCTIONS
Qty: 6 TABLET | Refills: 0 | Status: SHIPPED | OUTPATIENT
Start: 2023-03-27 | End: 2023-04-01

## 2023-03-27 ASSESSMENT — LIFESTYLE VARIABLES: SMOKING_STATUS: NO, I'VE NEVER SMOKED

## 2023-03-27 NOTE — PROGRESS NOTES
Yahaira Marquez (1970) initiated an asynchronous digital communication through [address] [place] [address]. HPI: per patient questionnaire     Exam: not applicable    Diagnoses and all orders for this visit:  Diagnoses and all orders for this visit:    Acute non-recurrent sinusitis, unspecified location    Other orders  -     azithromycin (ZITHROMAX) 250 MG tablet; Take 1 tablet by mouth See Admin Instructions for 5 days 500mg on day 1 followed by 250mg on days 2 - 5  Supportive care measures provided  Recommend follow-up with PCP if symptoms do not improve  Time: EV2 - 11-20 minutes were spent on the digital evaluation and management of this patient.  18 min     RENETTA Montgomery - CNP

## 2023-04-21 ENCOUNTER — E-VISIT (OUTPATIENT)
Dept: FAMILY MEDICINE CLINIC | Age: 53
End: 2023-04-21

## 2023-04-21 DIAGNOSIS — L03.119 CELLULITIS OF LOWER EXTREMITY, UNSPECIFIED LATERALITY: Primary | ICD-10-CM

## 2023-04-21 RX ORDER — DOXYCYCLINE HYCLATE 100 MG
100 TABLET ORAL 2 TIMES DAILY WITH MEALS
Qty: 20 TABLET | Refills: 0 | Status: SHIPPED | OUTPATIENT
Start: 2023-04-21 | End: 2023-05-01

## 2023-04-21 RX ORDER — CLINDAMYCIN PHOSPHATE 10 UG/ML
LOTION TOPICAL
Qty: 60 G | Refills: 2 | Status: SHIPPED | OUTPATIENT
Start: 2023-04-21 | End: 2023-05-21

## 2023-04-27 ENCOUNTER — OFFICE VISIT (OUTPATIENT)
Dept: FAMILY MEDICINE CLINIC | Age: 53
End: 2023-04-27
Payer: COMMERCIAL

## 2023-04-27 VITALS
RESPIRATION RATE: 17 BRPM | HEART RATE: 78 BPM | SYSTOLIC BLOOD PRESSURE: 143 MMHG | HEIGHT: 76 IN | DIASTOLIC BLOOD PRESSURE: 94 MMHG | TEMPERATURE: 97.5 F | WEIGHT: 315 LBS | BODY MASS INDEX: 38.36 KG/M2 | OXYGEN SATURATION: 95 %

## 2023-04-27 DIAGNOSIS — M79.662 PAIN OF LEFT LOWER LEG: Primary | ICD-10-CM

## 2023-04-27 PROCEDURE — 3078F DIAST BP <80 MM HG: CPT | Performed by: NURSE PRACTITIONER

## 2023-04-27 PROCEDURE — 99213 OFFICE O/P EST LOW 20 MIN: CPT | Performed by: NURSE PRACTITIONER

## 2023-04-27 PROCEDURE — 3074F SYST BP LT 130 MM HG: CPT | Performed by: NURSE PRACTITIONER

## 2023-04-27 SDOH — ECONOMIC STABILITY: INCOME INSECURITY: HOW HARD IS IT FOR YOU TO PAY FOR THE VERY BASICS LIKE FOOD, HOUSING, MEDICAL CARE, AND HEATING?: NOT VERY HARD

## 2023-04-27 SDOH — ECONOMIC STABILITY: FOOD INSECURITY: WITHIN THE PAST 12 MONTHS, THE FOOD YOU BOUGHT JUST DIDN'T LAST AND YOU DIDN'T HAVE MONEY TO GET MORE.: NEVER TRUE

## 2023-04-27 SDOH — ECONOMIC STABILITY: HOUSING INSECURITY
IN THE LAST 12 MONTHS, WAS THERE A TIME WHEN YOU DID NOT HAVE A STEADY PLACE TO SLEEP OR SLEPT IN A SHELTER (INCLUDING NOW)?: NO

## 2023-04-27 SDOH — ECONOMIC STABILITY: FOOD INSECURITY: WITHIN THE PAST 12 MONTHS, YOU WORRIED THAT YOUR FOOD WOULD RUN OUT BEFORE YOU GOT MONEY TO BUY MORE.: NEVER TRUE

## 2023-04-27 ASSESSMENT — PATIENT HEALTH QUESTIONNAIRE - PHQ9
SUM OF ALL RESPONSES TO PHQ QUESTIONS 1-9: 0
SUM OF ALL RESPONSES TO PHQ QUESTIONS 1-9: 0
2. FEELING DOWN, DEPRESSED OR HOPELESS: 0
SUM OF ALL RESPONSES TO PHQ QUESTIONS 1-9: 0
SUM OF ALL RESPONSES TO PHQ QUESTIONS 1-9: 0
1. LITTLE INTEREST OR PLEASURE IN DOING THINGS: 0
SUM OF ALL RESPONSES TO PHQ9 QUESTIONS 1 & 2: 0
DEPRESSION UNABLE TO ASSESS: FUNCTIONAL CAPACITY MOTIVATION LIMITS ACCURACY

## 2023-04-27 NOTE — PROGRESS NOTES
23  Ender Correa : 1970 Sex: male  Age 47 y.o. Subjective:  Chief Complaint   Patient presents with    Leg Pain     Left calf pain, two weeks ago he started getting pain and it comes and goes, he does have cellulitis on this leg he currently is on antibiotics. HPI:   Ender Correa , 47 y.o. male presents to the clinic for evaluation of intermittent left calf pain x 2 days. The patient reports pain is exacerbated with ambulation. The patient is currently taking doxycyline for LLE cellulitis. The patient reports initially the left lower extremity was red. The patient states the redness has resolved over time. The patient reports calf pain is unchanged symptoms over time. The patient denies bleeding, drainage, fever, lymphogenic streaking, myalgia, arthralgia, chills, and lethargy. The patient also denies headache, chest pain, abdominal pain, shortness of breath, and nausea / vomiting / diarrhea. ROS:   Unless otherwise stated in this report the patient's positive and negative responses for review of systems for constitutional, eyes, ENT, cardiovascular, respiratory, gastrointestinal, neurological, , musculoskeletal, and integument systems and related systems to the presenting problem are either stated in the history of present illness or were not pertinent or were negative for the symptoms and/or complaints related to the presenting medical problem. Positives and pertinent negatives as per HPI. All others reviewed and are negative.       PMH:     Past Medical History:   Diagnosis Date    Colitis, ulcerative chronic (HCC)     GERD (gastroesophageal reflux disease)     Hypertension     Thyroid disease        Past Surgical History:   Procedure Laterality Date    COLONOSCOPY      ENDOSCOPY, COLON, DIAGNOSTIC      HERNIA REPAIR N/A     UMBILICAL HERNIA REPAIR WITH MESH, LAPAROSCOPIC, ROBOTIC XI ASSISTED performed by Gi Zapata MD at [address] [place] [address]

## 2023-05-13 ENCOUNTER — E-VISIT (OUTPATIENT)
Dept: PRIMARY CARE CLINIC | Age: 53
End: 2023-05-13

## 2023-05-15 ENCOUNTER — TELEPHONE (OUTPATIENT)
Dept: FAMILY MEDICINE CLINIC | Age: 53
End: 2023-05-15

## 2023-05-15 DIAGNOSIS — M54.41 ACUTE BILATERAL LOW BACK PAIN WITH BILATERAL SCIATICA: Primary | ICD-10-CM

## 2023-05-15 DIAGNOSIS — M54.42 ACUTE BILATERAL LOW BACK PAIN WITH BILATERAL SCIATICA: Primary | ICD-10-CM

## 2023-05-15 DIAGNOSIS — R19.7 DIARRHEA, UNSPECIFIED TYPE: ICD-10-CM

## 2023-05-15 RX ORDER — DEXAMETHASONE 4 MG/1
4 TABLET ORAL 2 TIMES DAILY WITH MEALS
Qty: 20 TABLET | Refills: 1 | Status: SHIPPED | OUTPATIENT
Start: 2023-05-15 | End: 2023-05-25

## 2023-05-15 RX ORDER — METHOCARBAMOL 750 MG/1
750 TABLET, FILM COATED ORAL 4 TIMES DAILY
Qty: 80 TABLET | Refills: 0 | Status: SHIPPED | OUTPATIENT
Start: 2023-05-15 | End: 2023-06-04

## 2023-05-15 RX ORDER — INDOMETHACIN 50 MG/1
50 CAPSULE ORAL
Qty: 20 CAPSULE | Refills: 0 | Status: SHIPPED | OUTPATIENT
Start: 2023-05-15 | End: 2023-06-04

## 2023-05-15 NOTE — TELEPHONE ENCOUNTER
Patient called stating he was 15 minutes late for his appt today and was unable to be seen. Patient informed he has low back pain that he's been treated for by Dr. Hever Arredondo previously and it's increasing, causing pain and numbness into his buttocks and down his Lt leg. I informed the next available appt is 6/1/23. Please advise.       Last seen 4/21/2023  Next appt 6/22/2023

## 2023-05-16 NOTE — TELEPHONE ENCOUNTER
I called patient and informed, as noted by Dr. Hever Arredondo. Patient verbalized understanding and was agreeable.

## 2023-05-16 NOTE — TELEPHONE ENCOUNTER
Per Dr. Sulaiman Chowdary -     I will call him in a few things. I will also set him up with Apurva Baez just in case as a safety net.

## 2023-05-23 ENCOUNTER — OFFICE VISIT (OUTPATIENT)
Dept: PHYSICAL MEDICINE AND REHAB | Age: 53
End: 2023-05-23
Payer: COMMERCIAL

## 2023-05-23 VITALS
HEIGHT: 76 IN | WEIGHT: 315 LBS | BODY MASS INDEX: 38.36 KG/M2 | HEART RATE: 52 BPM | SYSTOLIC BLOOD PRESSURE: 144 MMHG | DIASTOLIC BLOOD PRESSURE: 82 MMHG

## 2023-05-23 DIAGNOSIS — G89.29 ACUTE EXACERBATION OF CHRONIC LOW BACK PAIN: Primary | ICD-10-CM

## 2023-05-23 DIAGNOSIS — M54.50 ACUTE EXACERBATION OF CHRONIC LOW BACK PAIN: Primary | ICD-10-CM

## 2023-05-23 DIAGNOSIS — M54.16 LUMBAR RADICULITIS: ICD-10-CM

## 2023-05-23 PROCEDURE — 99203 OFFICE O/P NEW LOW 30 MIN: CPT | Performed by: PHYSICAL MEDICINE & REHABILITATION

## 2023-05-23 PROCEDURE — 3079F DIAST BP 80-89 MM HG: CPT | Performed by: PHYSICAL MEDICINE & REHABILITATION

## 2023-05-23 PROCEDURE — 3077F SYST BP >= 140 MM HG: CPT | Performed by: PHYSICAL MEDICINE & REHABILITATION

## 2023-05-23 NOTE — PROGRESS NOTES
Comment: occasional     Drug use: No          Functional Status: The patient is able to ambulate and perform activities of daily living without the use of an assistive device. Occupation: The patient is currently employed - desk job. ROS:    Constitutional: Denies fevers, chills, night sweats, unintentional weight loss     Skin: Denies rash or skin changes     Eyes: Denies vision changes    Ears/Nose/Throat: Denies nasal congestion or sore throat     Respiratory: Denies SOB or cough     Cardiovascular: Denies CP, palpitations, edema      Gastrointestinal: +abdominal pain,  N/V, constipation, or diarrhea    Genitourinary: Denies urinary symptoms    Neurologic: See HPI.     MSK: See HPI. Psychiatric: Denies sleep disturbance, anxiety, depression    Hematologic/Lymphatic/Immunologic: Denies bruising       Physical Exam:   Blood pressure (!) 144/82, pulse 52, height 6' 4\" (1.93 m), weight (!) 370 lb (167.8 kg). General: well developed and well nourished in no acute distress. HEENT: No rhinorrhea, sneezing, yawning, or lacrimation. No scleral icterus or conjunctival injection. Resp: symmetrical chest expansion, unlabored breathing, respirations unlabored. CV: Heart rate is regular. Peripheral pulses are palpable  Lymph: No visible regional lymphadenopathy. Skin: No rashes or ecchymosis. Normal turgor. Psych: Mood is calm. Affect is normal.   Ext: No edema noted     MSK:   Back/Hip Exam:   Inspection: Pelvis was symmetric. Lumbar lordotic curvature was decreased. There was no scoliosis. No ecchymoses or erythema. Palpation: Palpatory exam revealed no tenderness along lumbosacral paraspinals, midline spine, SI joint sulcus, greater trochanters and TFL on both side. There was no paraspinal spasms. There were no trigger points.     ROM: ROM decreased  Special/provocative testing:   SLR negative     Neurological Exam:  Strength:   R  L  Hip Flex  5  5  Knee Ext  5  5  Ankle dorsi  5

## 2023-05-26 ENCOUNTER — E-VISIT (OUTPATIENT)
Dept: FAMILY MEDICINE CLINIC | Age: 53
End: 2023-05-26
Payer: COMMERCIAL

## 2023-05-26 DIAGNOSIS — L70.0 ACNE VULGARIS: Primary | ICD-10-CM

## 2023-05-26 PROCEDURE — 99421 OL DIG E/M SVC 5-10 MIN: CPT | Performed by: FAMILY MEDICINE

## 2023-05-26 RX ORDER — CLINDAMYCIN PHOSPHATE 10 UG/ML
LOTION TOPICAL
Qty: 60 G | Refills: 2 | Status: SHIPPED | OUTPATIENT
Start: 2023-05-26 | End: 2023-06-25

## 2023-06-21 DIAGNOSIS — E53.8 B12 DEFICIENCY: ICD-10-CM

## 2023-06-21 DIAGNOSIS — R53.82 CHRONIC FATIGUE: ICD-10-CM

## 2023-06-21 DIAGNOSIS — K51.90 ULCERATIVE COLITIS WITHOUT COMPLICATIONS, UNSPECIFIED LOCATION (HCC): ICD-10-CM

## 2023-06-21 DIAGNOSIS — E03.9 ACQUIRED HYPOTHYROIDISM: ICD-10-CM

## 2023-06-21 DIAGNOSIS — E61.1 IRON DEFICIENCY: ICD-10-CM

## 2023-06-21 LAB
ALBUMIN SERPL-MCNC: 4.2 G/DL (ref 3.5–5.2)
ALP SERPL-CCNC: 68 U/L (ref 40–129)
ALT SERPL-CCNC: 20 U/L (ref 0–40)
ANION GAP SERPL CALCULATED.3IONS-SCNC: 17 MMOL/L (ref 7–16)
AST SERPL-CCNC: 20 U/L (ref 0–39)
BASOPHILS # BLD: 0.02 E9/L (ref 0–0.2)
BASOPHILS NFR BLD: 0.2 % (ref 0–2)
BILIRUB SERPL-MCNC: 0.3 MG/DL (ref 0–1.2)
BUN SERPL-MCNC: 25 MG/DL (ref 6–20)
CALCIUM SERPL-MCNC: 9.7 MG/DL (ref 8.6–10.2)
CHLORIDE SERPL-SCNC: 102 MMOL/L (ref 98–107)
CO2 SERPL-SCNC: 25 MMOL/L (ref 22–29)
CREAT SERPL-MCNC: 1.1 MG/DL (ref 0.7–1.2)
CRP SERPL HS-MCNC: <0.3 MG/DL (ref 0–0.4)
EOSINOPHIL # BLD: 0.01 E9/L (ref 0.05–0.5)
EOSINOPHIL NFR BLD: 0.1 % (ref 0–6)
ERYTHROCYTE [DISTWIDTH] IN BLOOD BY AUTOMATED COUNT: 14.8 FL (ref 11.5–15)
ERYTHROCYTE [SEDIMENTATION RATE] IN BLOOD BY WESTERGREN METHOD: 5 MM/HR (ref 0–15)
GLUCOSE SERPL-MCNC: 106 MG/DL (ref 74–99)
HCT VFR BLD AUTO: 47.3 % (ref 37–54)
HGB BLD-MCNC: 15 G/DL (ref 12.5–16.5)
IMM GRANULOCYTES # BLD: 0.14 E9/L
IMM GRANULOCYTES NFR BLD: 1.3 % (ref 0–5)
IRON SATN MFR SERPL: 11 % (ref 20–55)
IRON SERPL-MCNC: 52 MCG/DL (ref 59–158)
LYMPHOCYTES # BLD: 1.5 E9/L (ref 1.5–4)
LYMPHOCYTES NFR BLD: 13.8 % (ref 20–42)
MCH RBC QN AUTO: 28.7 PG (ref 26–35)
MCHC RBC AUTO-ENTMCNC: 31.7 % (ref 32–34.5)
MCV RBC AUTO: 90.6 FL (ref 80–99.9)
MONOCYTES # BLD: 1.13 E9/L (ref 0.1–0.95)
MONOCYTES NFR BLD: 10.4 % (ref 2–12)
NEUTROPHILS # BLD: 8.04 E9/L (ref 1.8–7.3)
NEUTS SEG NFR BLD: 74.2 % (ref 43–80)
PLATELET # BLD AUTO: 385 E9/L (ref 130–450)
PMV BLD AUTO: 10.7 FL (ref 7–12)
POTASSIUM SERPL-SCNC: 4.8 MMOL/L (ref 3.5–5)
PROT SERPL-MCNC: 6.8 G/DL (ref 6.4–8.3)
RBC # BLD AUTO: 5.22 E12/L (ref 3.8–5.8)
SODIUM SERPL-SCNC: 144 MMOL/L (ref 132–146)
TIBC SERPL-MCNC: 467 MCG/DL (ref 250–450)
TSH SERPL-MCNC: 0.12 UIU/ML (ref 0.27–4.2)
WBC # BLD: 10.8 E9/L (ref 4.5–11.5)

## 2023-06-22 ENCOUNTER — OFFICE VISIT (OUTPATIENT)
Dept: FAMILY MEDICINE CLINIC | Age: 53
End: 2023-06-22
Payer: COMMERCIAL

## 2023-06-22 VITALS
DIASTOLIC BLOOD PRESSURE: 78 MMHG | BODY MASS INDEX: 38.36 KG/M2 | RESPIRATION RATE: 18 BRPM | HEIGHT: 76 IN | OXYGEN SATURATION: 98 % | WEIGHT: 315 LBS | SYSTOLIC BLOOD PRESSURE: 130 MMHG | HEART RATE: 50 BPM

## 2023-06-22 DIAGNOSIS — I10 PRIMARY HYPERTENSION: ICD-10-CM

## 2023-06-22 DIAGNOSIS — K21.9 GASTROESOPHAGEAL REFLUX DISEASE WITHOUT ESOPHAGITIS: ICD-10-CM

## 2023-06-22 DIAGNOSIS — M51.16 LUMBAR DISC DISEASE WITH RADICULOPATHY: ICD-10-CM

## 2023-06-22 DIAGNOSIS — K51.90 ULCERATIVE COLITIS WITHOUT COMPLICATIONS, UNSPECIFIED LOCATION (HCC): Primary | ICD-10-CM

## 2023-06-22 LAB
FOLATE SERPL-MCNC: 8.7 NG/ML (ref 4.8–24.2)
REASON FOR REJECTION: NORMAL
REJECTED TEST: NORMAL
VIT B12 SERPL-MCNC: 591 PG/ML (ref 211–946)

## 2023-06-22 PROCEDURE — 3078F DIAST BP <80 MM HG: CPT | Performed by: FAMILY MEDICINE

## 2023-06-22 PROCEDURE — 99213 OFFICE O/P EST LOW 20 MIN: CPT | Performed by: FAMILY MEDICINE

## 2023-06-22 PROCEDURE — 3074F SYST BP LT 130 MM HG: CPT | Performed by: FAMILY MEDICINE

## 2023-06-22 RX ORDER — FAMOTIDINE 20 MG/1
TABLET, FILM COATED ORAL
COMMUNITY
Start: 2023-05-26 | End: 2023-06-22

## 2023-06-22 RX ORDER — FAMOTIDINE 40 MG/1
40 TABLET, FILM COATED ORAL NIGHTLY
Qty: 90 TABLET | Refills: 5 | Status: SHIPPED | OUTPATIENT
Start: 2023-06-22

## 2023-06-22 RX ORDER — TRIAMTERENE AND HYDROCHLOROTHIAZIDE 37.5; 25 MG/1; MG/1
1 CAPSULE ORAL DAILY
Qty: 90 CAPSULE | Refills: 3 | Status: SHIPPED | OUTPATIENT
Start: 2023-06-22

## 2023-06-25 DIAGNOSIS — M54.41 ACUTE BILATERAL LOW BACK PAIN WITH BILATERAL SCIATICA: ICD-10-CM

## 2023-06-25 DIAGNOSIS — M54.42 ACUTE BILATERAL LOW BACK PAIN WITH BILATERAL SCIATICA: ICD-10-CM

## 2023-06-25 DIAGNOSIS — R19.7 DIARRHEA, UNSPECIFIED TYPE: ICD-10-CM

## 2023-06-26 RX ORDER — DEXAMETHASONE 4 MG/1
TABLET ORAL
Qty: 20 TABLET | Refills: 1 | OUTPATIENT
Start: 2023-06-26

## 2023-06-26 ASSESSMENT — ENCOUNTER SYMPTOMS
EYE PAIN: 0
VOICE CHANGE: 0
EYE ITCHING: 0
CONSTIPATION: 0
COUGH: 0
PHOTOPHOBIA: 0
ANAL BLEEDING: 0
SINUS PRESSURE: 0
CHEST TIGHTNESS: 0
CHOKING: 0
COLOR CHANGE: 0
VOMITING: 0
FACIAL SWELLING: 0
DIARRHEA: 0
EYE REDNESS: 0
ABDOMINAL DISTENTION: 0
STRIDOR: 0
ABDOMINAL PAIN: 0
RECTAL PAIN: 0
RHINORRHEA: 0
BLOOD IN STOOL: 0
BACK PAIN: 1
SORE THROAT: 0
WHEEZING: 0
APNEA: 0
NAUSEA: 0
SHORTNESS OF BREATH: 0
TROUBLE SWALLOWING: 0
EYE DISCHARGE: 0

## 2023-07-17 ENCOUNTER — E-VISIT (OUTPATIENT)
Dept: PRIMARY CARE CLINIC | Age: 53
End: 2023-07-17
Payer: COMMERCIAL

## 2023-07-17 DIAGNOSIS — L03.119 CELLULITIS OF LOWER EXTREMITY, UNSPECIFIED LATERALITY: Primary | ICD-10-CM

## 2023-07-17 PROCEDURE — 99422 OL DIG E/M SVC 11-20 MIN: CPT | Performed by: PHYSICIAN ASSISTANT

## 2023-07-17 RX ORDER — DOXYCYCLINE HYCLATE 100 MG
100 TABLET ORAL 2 TIMES DAILY
Qty: 20 TABLET | Refills: 0 | Status: SHIPPED | OUTPATIENT
Start: 2023-07-17 | End: 2023-07-27

## 2023-07-17 NOTE — PROGRESS NOTES
I have reviewed the questionnaire, PMH, Medications and allergies. Diagnoses and all orders for this visit:    Cellulitis of lower extremity, unspecified laterality  -     doxycycline hyclate (VIBRA-TABS) 100 MG tablet; Take 1 tablet by mouth 2 times daily for 10 days        Continue with supportive care and follow up with PCP if no improvement with treatment. 11-20 minutes were spent on the digital evaluation and management of this patient.

## 2023-07-21 DIAGNOSIS — M51.16 LUMBAR DISC DISEASE WITH RADICULOPATHY: Primary | ICD-10-CM

## 2023-07-24 DIAGNOSIS — M51.16 LUMBAR DISC DISEASE WITH RADICULOPATHY: Primary | ICD-10-CM

## 2023-07-24 RX ORDER — TRAMADOL HYDROCHLORIDE 50 MG/1
50 TABLET ORAL EVERY 6 HOURS PRN
Qty: 30 TABLET | Refills: 0 | Status: SHIPPED | OUTPATIENT
Start: 2023-07-24 | End: 2023-08-07

## 2023-07-26 DIAGNOSIS — M51.16 LUMBAR DISC DISEASE WITH RADICULOPATHY: Primary | ICD-10-CM

## 2023-07-27 RX ORDER — METHOCARBAMOL 750 MG/1
750 TABLET, FILM COATED ORAL 4 TIMES DAILY
Qty: 120 TABLET | Refills: 3 | Status: SHIPPED | OUTPATIENT
Start: 2023-07-27 | End: 2023-11-24

## 2023-08-02 DIAGNOSIS — M54.41 ACUTE BILATERAL LOW BACK PAIN WITH BILATERAL SCIATICA: Primary | ICD-10-CM

## 2023-08-02 DIAGNOSIS — M54.42 ACUTE BILATERAL LOW BACK PAIN WITH BILATERAL SCIATICA: Primary | ICD-10-CM

## 2023-08-02 RX ORDER — CARISOPRODOL 350 MG/1
350 TABLET ORAL NIGHTLY
Qty: 30 TABLET | Refills: 1 | Status: SHIPPED | OUTPATIENT
Start: 2023-08-02 | End: 2023-10-01

## 2023-08-06 DIAGNOSIS — M54.42 ACUTE BILATERAL LOW BACK PAIN WITH BILATERAL SCIATICA: ICD-10-CM

## 2023-08-06 DIAGNOSIS — M54.41 ACUTE BILATERAL LOW BACK PAIN WITH BILATERAL SCIATICA: ICD-10-CM

## 2023-08-06 DIAGNOSIS — R19.7 DIARRHEA, UNSPECIFIED TYPE: ICD-10-CM

## 2023-08-07 RX ORDER — DEXAMETHASONE 4 MG/1
TABLET ORAL
Qty: 20 TABLET | Refills: 1 | OUTPATIENT
Start: 2023-08-07

## 2023-08-09 DIAGNOSIS — M51.16 LUMBAR DISC DISEASE WITH RADICULOPATHY: ICD-10-CM

## 2023-08-16 DIAGNOSIS — M54.41 ACUTE BILATERAL LOW BACK PAIN WITH BILATERAL SCIATICA: ICD-10-CM

## 2023-08-16 DIAGNOSIS — R19.7 DIARRHEA, UNSPECIFIED TYPE: ICD-10-CM

## 2023-08-16 DIAGNOSIS — M54.42 ACUTE BILATERAL LOW BACK PAIN WITH BILATERAL SCIATICA: ICD-10-CM

## 2023-08-16 RX ORDER — DEXAMETHASONE 4 MG/1
4 TABLET ORAL 2 TIMES DAILY WITH MEALS
Qty: 20 TABLET | Refills: 1 | Status: SHIPPED | OUTPATIENT
Start: 2023-08-16 | End: 2023-08-26

## 2023-08-23 RX ORDER — IBUPROFEN 800 MG/1
800 TABLET ORAL
Qty: 270 TABLET | Refills: 1 | Status: SHIPPED | OUTPATIENT
Start: 2023-08-23

## 2023-08-23 RX ORDER — METHOCARBAMOL 750 MG/1
750 TABLET, FILM COATED ORAL 4 TIMES DAILY
Qty: 120 TABLET | Refills: 3 | Status: SHIPPED
Start: 2023-08-23 | End: 2023-08-25

## 2023-08-25 RX ORDER — METAXALONE 800 MG/1
800 TABLET ORAL 3 TIMES DAILY
Qty: 270 TABLET | Refills: 3 | Status: SHIPPED | OUTPATIENT
Start: 2023-08-25 | End: 2023-11-23

## 2023-08-29 RX ORDER — METAXALONE 800 MG/1
800 TABLET ORAL 3 TIMES DAILY
Qty: 90 TABLET | Refills: 3 | Status: SHIPPED | OUTPATIENT
Start: 2023-08-29 | End: 2023-11-27

## 2023-09-12 ENCOUNTER — OFFICE VISIT (OUTPATIENT)
Dept: FAMILY MEDICINE CLINIC | Age: 53
End: 2023-09-12
Payer: COMMERCIAL

## 2023-09-12 VITALS
HEIGHT: 76 IN | HEART RATE: 69 BPM | DIASTOLIC BLOOD PRESSURE: 84 MMHG | WEIGHT: 315 LBS | RESPIRATION RATE: 18 BRPM | OXYGEN SATURATION: 97 % | SYSTOLIC BLOOD PRESSURE: 128 MMHG | BODY MASS INDEX: 38.36 KG/M2

## 2023-09-12 DIAGNOSIS — R60.9 EDEMA, UNSPECIFIED TYPE: ICD-10-CM

## 2023-09-12 DIAGNOSIS — R73.01 IFG (IMPAIRED FASTING GLUCOSE): ICD-10-CM

## 2023-09-12 DIAGNOSIS — R53.82 CHRONIC FATIGUE: Primary | ICD-10-CM

## 2023-09-12 DIAGNOSIS — M51.16 LUMBAR DISC DISEASE WITH RADICULOPATHY: ICD-10-CM

## 2023-09-12 DIAGNOSIS — Z12.5 SCREENING FOR MALIGNANT NEOPLASM OF PROSTATE: ICD-10-CM

## 2023-09-12 DIAGNOSIS — E78.2 MIXED HYPERLIPIDEMIA: ICD-10-CM

## 2023-09-12 DIAGNOSIS — I87.2 VENOUS INSUFFICIENCY (CHRONIC) (PERIPHERAL): ICD-10-CM

## 2023-09-12 DIAGNOSIS — N40.0 BENIGN PROSTATIC HYPERPLASIA WITHOUT LOWER URINARY TRACT SYMPTOMS: ICD-10-CM

## 2023-09-12 DIAGNOSIS — M79.89 LEG SWELLING: ICD-10-CM

## 2023-09-12 DIAGNOSIS — E34.9 HYPOTESTOSTERONISM: ICD-10-CM

## 2023-09-12 DIAGNOSIS — I87.2 STASIS DERMATITIS OF BOTH LEGS: ICD-10-CM

## 2023-09-12 DIAGNOSIS — M48.062 SPINAL STENOSIS OF LUMBAR REGION WITH NEUROGENIC CLAUDICATION: ICD-10-CM

## 2023-09-12 DIAGNOSIS — E61.1 IRON DEFICIENCY: ICD-10-CM

## 2023-09-12 PROCEDURE — 3074F SYST BP LT 130 MM HG: CPT | Performed by: FAMILY MEDICINE

## 2023-09-12 PROCEDURE — 3078F DIAST BP <80 MM HG: CPT | Performed by: FAMILY MEDICINE

## 2023-09-12 PROCEDURE — 99214 OFFICE O/P EST MOD 30 MIN: CPT | Performed by: FAMILY MEDICINE

## 2023-09-12 RX ORDER — TADALAFIL 5 MG/1
5 TABLET ORAL DAILY
Qty: 30 TABLET | Refills: 4 | Status: SHIPPED | OUTPATIENT
Start: 2023-09-12

## 2023-09-12 RX ORDER — CLINDAMYCIN PHOSPHATE 10 UG/ML
LOTION TOPICAL
Qty: 60 G | Refills: 2 | Status: SHIPPED | OUTPATIENT
Start: 2023-09-12 | End: 2023-10-12

## 2023-09-12 RX ORDER — FUROSEMIDE 40 MG/1
40 TABLET ORAL DAILY
Qty: 30 TABLET | Refills: 4 | Status: SHIPPED | OUTPATIENT
Start: 2023-09-12

## 2023-09-12 RX ORDER — POTASSIUM CHLORIDE 20 MEQ/1
20 TABLET, EXTENDED RELEASE ORAL DAILY
Qty: 30 TABLET | Refills: 5 | Status: SHIPPED | OUTPATIENT
Start: 2023-09-12

## 2023-09-13 DIAGNOSIS — E78.2 MIXED HYPERLIPIDEMIA: ICD-10-CM

## 2023-09-13 DIAGNOSIS — E34.9 HYPOTESTOSTERONISM: ICD-10-CM

## 2023-09-13 DIAGNOSIS — R60.9 EDEMA, UNSPECIFIED TYPE: ICD-10-CM

## 2023-09-13 DIAGNOSIS — R53.82 CHRONIC FATIGUE: ICD-10-CM

## 2023-09-13 DIAGNOSIS — R73.01 IFG (IMPAIRED FASTING GLUCOSE): ICD-10-CM

## 2023-09-13 DIAGNOSIS — E61.1 IRON DEFICIENCY: ICD-10-CM

## 2023-09-13 DIAGNOSIS — Z12.5 SCREENING FOR MALIGNANT NEOPLASM OF PROSTATE: ICD-10-CM

## 2023-09-13 LAB
ABSOLUTE IMMATURE GRANULOCYTE: 0.11 K/UL (ref 0–0.58)
ALBUMIN SERPL-MCNC: 3.9 G/DL (ref 3.5–5.2)
ALP BLD-CCNC: 60 U/L (ref 40–129)
ALT SERPL-CCNC: 42 U/L (ref 0–40)
ANION GAP SERPL CALCULATED.3IONS-SCNC: 18 MMOL/L (ref 7–16)
AST SERPL-CCNC: 36 U/L (ref 0–39)
BASOPHILS ABSOLUTE: 0.09 K/UL (ref 0–0.2)
BASOPHILS RELATIVE PERCENT: 2 % (ref 0–2)
BILIRUB SERPL-MCNC: 0.4 MG/DL (ref 0–1.2)
BUN BLDV-MCNC: 18 MG/DL (ref 6–20)
CALCIUM SERPL-MCNC: 9.5 MG/DL (ref 8.6–10.2)
CHLORIDE BLD-SCNC: 105 MMOL/L (ref 98–107)
CHOLESTEROL: 225 MG/DL
CO2: 19 MMOL/L (ref 22–29)
CREAT SERPL-MCNC: 1.2 MG/DL (ref 0.7–1.2)
EOSINOPHILS ABSOLUTE: 0.34 K/UL (ref 0.05–0.5)
EOSINOPHILS RELATIVE PERCENT: 6 % (ref 0–6)
GFR SERPL CREATININE-BSD FRML MDRD: >60 ML/MIN/1.73M2
GLUCOSE BLD-MCNC: 92 MG/DL (ref 74–99)
HBA1C MFR BLD: 5.6 % (ref 4–5.6)
HCT VFR BLD CALC: 42.1 % (ref 37–54)
HDLC SERPL-MCNC: 37 MG/DL
HEMOGLOBIN: 13.7 G/DL (ref 12.5–16.5)
IMMATURE GRANULOCYTES: 2 % (ref 0–5)
IRON SATURATION: 27 % (ref 20–55)
IRON: 109 UG/DL (ref 59–158)
LDL CHOLESTEROL: 143 MG/DL
LYMPHOCYTES ABSOLUTE: 1.81 K/UL (ref 1.5–4)
LYMPHOCYTES RELATIVE PERCENT: 31 % (ref 20–42)
MCH RBC QN AUTO: 29.8 PG (ref 26–35)
MCHC RBC AUTO-ENTMCNC: 32.5 G/DL (ref 32–34.5)
MCV RBC AUTO: 91.7 FL (ref 80–99.9)
MONOCYTES ABSOLUTE: 0.68 K/UL (ref 0.1–0.95)
MONOCYTES RELATIVE PERCENT: 12 % (ref 2–12)
NEUTROPHILS ABSOLUTE: 2.8 K/UL (ref 1.8–7.3)
NEUTROPHILS RELATIVE PERCENT: 48 % (ref 43–80)
PDW BLD-RTO: 17.3 % (ref 11.5–15)
PLATELET # BLD: 188 K/UL (ref 130–450)
PMV BLD AUTO: 10.2 FL (ref 7–12)
POTASSIUM SERPL-SCNC: 4.3 MMOL/L (ref 3.5–5)
PRO-BNP: 60 PG/ML (ref 0–125)
PROSTATE SPECIFIC ANTIGEN: 0.87 NG/ML (ref 0–4)
RBC # BLD: 4.59 M/UL (ref 3.8–5.8)
SODIUM BLD-SCNC: 142 MMOL/L (ref 132–146)
T4 FREE: 1.2 NG/DL (ref 0.9–1.7)
TESTOSTERONE TOTAL: 338 NG/DL (ref 193–740)
TOTAL IRON BINDING CAPACITY: 400 UG/DL (ref 250–450)
TOTAL PROTEIN: 6.6 G/DL (ref 6.4–8.3)
TRIGL SERPL-MCNC: 227 MG/DL
TSH SERPL DL<=0.05 MIU/L-ACNC: 1.59 UIU/ML (ref 0.27–4.2)
VLDLC SERPL CALC-MCNC: 45 MG/DL
WBC # BLD: 5.8 K/UL (ref 4.5–11.5)

## 2023-09-16 ASSESSMENT — ENCOUNTER SYMPTOMS
ABDOMINAL DISTENTION: 0
NAUSEA: 0
ABDOMINAL PAIN: 0
COLOR CHANGE: 0
SINUS PRESSURE: 0
APNEA: 0
ANAL BLEEDING: 0
BLOOD IN STOOL: 0
DIARRHEA: 0
VOMITING: 0
SHORTNESS OF BREATH: 0
VOICE CHANGE: 0
CONSTIPATION: 0
BACK PAIN: 1
EYE DISCHARGE: 0
PHOTOPHOBIA: 0
EYE REDNESS: 0
RECTAL PAIN: 0
FACIAL SWELLING: 0
SORE THROAT: 0
EYE PAIN: 0
COUGH: 0
STRIDOR: 0
TROUBLE SWALLOWING: 0
CHEST TIGHTNESS: 0
WHEEZING: 0
CHOKING: 0
EYE ITCHING: 0
RHINORRHEA: 0

## 2023-09-29 DIAGNOSIS — R06.81 WITNESSED APNEIC SPELLS: Primary | ICD-10-CM

## 2023-09-30 ENCOUNTER — E-VISIT (OUTPATIENT)
Dept: PRIMARY CARE CLINIC | Age: 53
End: 2023-09-30
Payer: COMMERCIAL

## 2023-09-30 DIAGNOSIS — J01.90 ACUTE NON-RECURRENT SINUSITIS, UNSPECIFIED LOCATION: Primary | ICD-10-CM

## 2023-09-30 PROCEDURE — 99422 OL DIG E/M SVC 11-20 MIN: CPT | Performed by: NURSE PRACTITIONER

## 2023-09-30 RX ORDER — AZITHROMYCIN 250 MG/1
TABLET, FILM COATED ORAL
Qty: 1 PACKET | Refills: 0 | Status: SHIPPED | OUTPATIENT
Start: 2023-09-30 | End: 2023-10-10

## 2023-09-30 ASSESSMENT — LIFESTYLE VARIABLES: SMOKING_STATUS: NO, I'VE NEVER SMOKED

## 2023-09-30 NOTE — PROGRESS NOTES
Shakeel Mohan (1970) initiated an asynchronous digital communication through [facility]. HPI: per patient questionnaire     Exam: not applicable    Diagnoses and all orders for this visit:  Diagnoses and all orders for this visit:    Acute non-recurrent sinusitis, unspecified location    Other orders  -     azithromycin (ZITHROMAX) 250 MG tablet; 2 tablets now then 1 daily until gone. Sx are likely related to allergies based on responses. Will send in antibiotic d/t 5 days and its the weekend. Supportive care measures provided  Fu with pcp     Time: EV2 - 11-20 minutes were spent on the digital evaluation and management of this patient.  18 min     RENETTA Morris - CNP

## 2023-10-05 DIAGNOSIS — M51.16 LUMBAR DISC DISEASE WITH RADICULOPATHY: Primary | ICD-10-CM

## 2023-10-09 ENCOUNTER — TELEPHONE (OUTPATIENT)
Dept: INTERVENTIONAL RADIOLOGY/VASCULAR | Age: 53
End: 2023-10-09

## 2023-10-09 NOTE — TELEPHONE ENCOUNTER
Left message on voicemail regarding vascular testing appointment on 10/12/2023 at 8:00 am. Instructed patient to arrive 15 minutes prior to appointment time, Enter through Entrance B, register to right of entrance, and report to Cardiac Services for test.

## 2023-10-11 DIAGNOSIS — F51.01 PRIMARY INSOMNIA: ICD-10-CM

## 2023-10-12 RX ORDER — TRAZODONE HYDROCHLORIDE 50 MG/1
TABLET ORAL
Qty: 60 TABLET | Refills: 11 | Status: SHIPPED | OUTPATIENT
Start: 2023-10-12

## 2023-10-18 ENCOUNTER — TELEPHONE (OUTPATIENT)
Dept: SLEEP CENTER | Age: 53
End: 2023-10-18

## 2023-10-19 ENCOUNTER — E-VISIT (OUTPATIENT)
Dept: PRIMARY CARE CLINIC | Age: 53
End: 2023-10-19

## 2023-10-19 ENCOUNTER — OFFICE VISIT (OUTPATIENT)
Dept: PAIN MANAGEMENT | Age: 53
End: 2023-10-19
Payer: COMMERCIAL

## 2023-10-19 VITALS
HEART RATE: 76 BPM | DIASTOLIC BLOOD PRESSURE: 87 MMHG | RESPIRATION RATE: 16 BRPM | BODY MASS INDEX: 38.36 KG/M2 | SYSTOLIC BLOOD PRESSURE: 134 MMHG | WEIGHT: 315 LBS | HEIGHT: 76 IN | OXYGEN SATURATION: 94 %

## 2023-10-19 DIAGNOSIS — G89.29 CHRONIC LEFT-SIDED LOW BACK PAIN WITH LEFT-SIDED SCIATICA: ICD-10-CM

## 2023-10-19 DIAGNOSIS — M48.062 SPINAL STENOSIS OF LUMBAR REGION WITH NEUROGENIC CLAUDICATION: ICD-10-CM

## 2023-10-19 DIAGNOSIS — L03.119 CELLULITIS OF LOWER EXTREMITY, UNSPECIFIED LATERALITY: Primary | ICD-10-CM

## 2023-10-19 DIAGNOSIS — M54.42 CHRONIC LEFT-SIDED LOW BACK PAIN WITH LEFT-SIDED SCIATICA: ICD-10-CM

## 2023-10-19 DIAGNOSIS — G89.4 CHRONIC PAIN SYNDROME: Primary | ICD-10-CM

## 2023-10-19 DIAGNOSIS — M51.9 LUMBAR DISC DISORDER: ICD-10-CM

## 2023-10-19 DIAGNOSIS — M54.16 LUMBAR RADICULOPATHY: ICD-10-CM

## 2023-10-19 PROCEDURE — 99204 OFFICE O/P NEW MOD 45 MIN: CPT | Performed by: PAIN MEDICINE

## 2023-10-19 PROCEDURE — 99205 OFFICE O/P NEW HI 60 MIN: CPT | Performed by: PAIN MEDICINE

## 2023-10-19 PROCEDURE — 3079F DIAST BP 80-89 MM HG: CPT | Performed by: PAIN MEDICINE

## 2023-10-19 PROCEDURE — 3075F SYST BP GE 130 - 139MM HG: CPT | Performed by: PAIN MEDICINE

## 2023-10-19 RX ORDER — DOXYCYCLINE 100 MG/1
100 CAPSULE ORAL 2 TIMES DAILY
Qty: 20 CAPSULE | Refills: 0 | Status: SHIPPED | OUTPATIENT
Start: 2023-10-19 | End: 2023-10-29

## 2023-10-19 NOTE — PROGRESS NOTES
[facility]        [address] [facility]  Dept: [phone]        Patient:  Vimal Alva DOB 1970    Date of Service:  10/19/23     Requesting Physician:  Kingsley Gifford DO    Reason for Consult:      Patient presents with complaints of left, lower back pain that started 3 months ago    HISTORY OF PRESENT ILLNESS:      Pain is constant and is described as aching, stabbing, and numb. Pain does radiate to left leg. He  has numbness, tingling of the left leg and does not have bladder or bowel dysfunction. Alleviating factors include: rest and sitting. Aggravating factors include:  walking, standing. He has been on anticoagulation medications to include NSAIDS and has not been on herbal supplements. He is not diabetic. Imagin/2023 lumbar MRI w/o -      2023 lumbar xray -     FINDINGS:  Vertebral body heights appear maintained. Scattered endplate and facet joint  degenerative changes with moderate disc space narrowing L3-L4 and L5-S1. IMPRESSION:  Diffuse degenerative changes with moderate disc space narrowing L3-L4 and  L5-S1. Previous treatments: Physical Therapy and medications. Past Medical History:   Diagnosis Date    Colitis, ulcerative chronic (HCC)     GERD (gastroesophageal reflux disease)     Hypertension     Thyroid disease        Past Surgical History:   Procedure Laterality Date    COLONOSCOPY      ENDOSCOPY, COLON, DIAGNOSTIC      HERNIA REPAIR N/A 5671    UMBILICAL HERNIA REPAIR WITH MESH, LAPAROSCOPIC, ROBOTIC XI ASSISTED performed by Bret Archer MD at [address] [place] [address]    clean torn cartilage       Prior to Admission medications    Medication Sig Start Date End Date Taking?  Authorizing Provider   traZODone (DESYREL) 50 MG tablet TAKE 2 TABLETS BY MOUTH AT NIGHT 10/12/23  Yes Aba MORRIS DO   furosemide (LASIX) 40 MG tablet Take 1 tablet by mouth daily 23  Yes Krystina Mcmillan

## 2023-10-24 DIAGNOSIS — I49.9 IRREGULAR HEART RATE: Primary | ICD-10-CM

## 2023-10-25 ENCOUNTER — HOSPITAL ENCOUNTER (OUTPATIENT)
Age: 53
Discharge: HOME | End: 2023-10-25
Payer: COMMERCIAL

## 2023-10-25 ENCOUNTER — OFFICE VISIT (OUTPATIENT)
Dept: FAMILY MEDICINE CLINIC | Age: 53
End: 2023-10-25
Payer: COMMERCIAL

## 2023-10-25 VITALS
OXYGEN SATURATION: 97 % | RESPIRATION RATE: 18 BRPM | BODY MASS INDEX: 38.36 KG/M2 | WEIGHT: 315 LBS | SYSTOLIC BLOOD PRESSURE: 128 MMHG | HEART RATE: 87 BPM | DIASTOLIC BLOOD PRESSURE: 84 MMHG | HEIGHT: 76 IN

## 2023-10-25 DIAGNOSIS — R73.01 IFG (IMPAIRED FASTING GLUCOSE): Primary | ICD-10-CM

## 2023-10-25 DIAGNOSIS — R00.2 PALPITATION: ICD-10-CM

## 2023-10-25 DIAGNOSIS — E61.1 IRON DEFICIENCY: ICD-10-CM

## 2023-10-25 DIAGNOSIS — M54.16 LUMBAR RADICULOPATHY: Primary | ICD-10-CM

## 2023-10-25 DIAGNOSIS — E78.2 MIXED HYPERLIPIDEMIA: ICD-10-CM

## 2023-10-25 DIAGNOSIS — I87.2 VENOUS INSUFFICIENCY (CHRONIC) (PERIPHERAL): ICD-10-CM

## 2023-10-25 DIAGNOSIS — R00.2 PALPITATIONS: ICD-10-CM

## 2023-10-25 DIAGNOSIS — R00.0 RACING HEART BEAT: ICD-10-CM

## 2023-10-25 DIAGNOSIS — E53.8 VITAMIN B 12 DEFICIENCY: ICD-10-CM

## 2023-10-25 DIAGNOSIS — R53.82 CHRONIC FATIGUE: ICD-10-CM

## 2023-10-25 PROCEDURE — 99214 OFFICE O/P EST MOD 30 MIN: CPT | Performed by: FAMILY MEDICINE

## 2023-10-25 PROCEDURE — 3074F SYST BP LT 130 MM HG: CPT | Performed by: FAMILY MEDICINE

## 2023-10-25 PROCEDURE — 3078F DIAST BP <80 MM HG: CPT | Performed by: FAMILY MEDICINE

## 2023-10-25 PROCEDURE — 93005 ELECTROCARDIOGRAM TRACING: CPT | Performed by: FAMILY MEDICINE

## 2023-10-25 RX ORDER — METOLAZONE 5 MG/1
5 TABLET ORAL DAILY
Qty: 30 TABLET | Refills: 3 | Status: SHIPPED | OUTPATIENT
Start: 2023-10-25

## 2023-10-25 RX ORDER — GABAPENTIN 300 MG/1
300 CAPSULE ORAL 3 TIMES DAILY
Qty: 270 CAPSULE | Refills: 1 | Status: SHIPPED | OUTPATIENT
Start: 2023-10-25 | End: 2024-04-22

## 2023-10-26 LAB
EKG ATRIAL RATE: 81 BPM
EKG P AXIS: 9 DEGREES
EKG P-R INTERVAL: 154 MS
EKG Q-T INTERVAL: 376 MS
EKG QRS DURATION: 84 MS
EKG QTC CALCULATION (BAZETT): 436 MS
EKG R AXIS: -6 DEGREES
EKG T AXIS: 20 DEGREES
EKG VENTRICULAR RATE: 81 BPM

## 2023-10-29 ASSESSMENT — ENCOUNTER SYMPTOMS
EYE DISCHARGE: 0
BLOOD IN STOOL: 0
EYE PAIN: 0
COUGH: 0
ABDOMINAL DISTENTION: 0
BACK PAIN: 0
ANAL BLEEDING: 0
ABDOMINAL PAIN: 0
CONSTIPATION: 0
RHINORRHEA: 0
RECTAL PAIN: 0
SHORTNESS OF BREATH: 0
SORE THROAT: 0
PHOTOPHOBIA: 0
FACIAL SWELLING: 0
EYE ITCHING: 0
EYE REDNESS: 0
STRIDOR: 0
VOICE CHANGE: 0
SINUS PRESSURE: 0
APNEA: 0
CHOKING: 0
TROUBLE SWALLOWING: 0
VOMITING: 0
DIARRHEA: 0
COLOR CHANGE: 0
NAUSEA: 0
WHEEZING: 0
CHEST TIGHTNESS: 0

## 2023-10-30 ENCOUNTER — TELEPHONE (OUTPATIENT)
Dept: PAIN MANAGEMENT | Age: 53
End: 2023-10-30

## 2023-10-30 DIAGNOSIS — M54.16 LUMBAR RADICULOPATHY: Primary | ICD-10-CM

## 2023-10-30 NOTE — TELEPHONE ENCOUNTER
Call to Willow Chapin that procedure was approved for 11/3/2023 and that Alen Navarro should call him a few days before for the pre op call and between 2:00 PM and 4:00 PM  the business day before with the arrival time. Instructed Shoshana Waters to hold ibuprofen for 24 hours, naprosyn for 4 days and any aspirin containing products or fish oil for 7 days. Instructed to call office back if any questions. Shoshana Waters verbalized understanding.     Electronically signed by Zo Arceo RN on 10/30/2023 at 11:56 AM

## 2023-11-02 ENCOUNTER — HOSPITAL ENCOUNTER (OUTPATIENT)
Dept: SLEEP CENTER | Age: 53
Discharge: HOME | End: 2023-11-02
Payer: COMMERCIAL

## 2023-11-02 DIAGNOSIS — R06.81 WITNESSED APNEIC SPELLS: ICD-10-CM

## 2023-11-02 PROCEDURE — 95800 SLP STDY UNATTENDED: CPT

## 2023-11-03 ENCOUNTER — HOSPITAL ENCOUNTER (OUTPATIENT)
Dept: GENERAL RADIOLOGY | Age: 53
End: 2023-11-03
Attending: PAIN MEDICINE
Payer: COMMERCIAL

## 2023-11-03 ENCOUNTER — HOSPITAL ENCOUNTER (OUTPATIENT)
Age: 53
Setting detail: OUTPATIENT SURGERY
Discharge: HOME | End: 2023-11-03
Attending: PAIN MEDICINE | Admitting: PAIN MEDICINE
Payer: COMMERCIAL

## 2023-11-03 VITALS
HEIGHT: 76 IN | BODY MASS INDEX: 38.36 KG/M2 | TEMPERATURE: 98 F | SYSTOLIC BLOOD PRESSURE: 131 MMHG | WEIGHT: 315 LBS | DIASTOLIC BLOOD PRESSURE: 74 MMHG | OXYGEN SATURATION: 97 % | RESPIRATION RATE: 18 BRPM | HEART RATE: 69 BPM

## 2023-11-03 DIAGNOSIS — R52 PAIN MANAGEMENT: ICD-10-CM

## 2023-11-03 PROCEDURE — 64484 NJX AA&/STRD TFRM EPI L/S EA: CPT | Performed by: PAIN MEDICINE

## 2023-11-03 PROCEDURE — 3600000002 HC SURGERY LEVEL 2 BASE: Performed by: PAIN MEDICINE

## 2023-11-03 PROCEDURE — 7100000011 HC PHASE II RECOVERY - ADDTL 15 MIN: Performed by: PAIN MEDICINE

## 2023-11-03 PROCEDURE — 6360000002 HC RX W HCPCS: Performed by: PAIN MEDICINE

## 2023-11-03 PROCEDURE — 2500000003 HC RX 250 WO HCPCS: Performed by: PAIN MEDICINE

## 2023-11-03 PROCEDURE — 2709999900 HC NON-CHARGEABLE SUPPLY: Performed by: PAIN MEDICINE

## 2023-11-03 PROCEDURE — 64483 NJX AA&/STRD TFRM EPI L/S 1: CPT | Performed by: PAIN MEDICINE

## 2023-11-03 PROCEDURE — 6360000004 HC RX CONTRAST MEDICATION: Performed by: PAIN MEDICINE

## 2023-11-03 PROCEDURE — 7100000010 HC PHASE II RECOVERY - FIRST 15 MIN: Performed by: PAIN MEDICINE

## 2023-11-03 RX ORDER — IOPAMIDOL 612 MG/ML
INJECTION, SOLUTION INTRATHECAL PRN
Status: DISCONTINUED | OUTPATIENT
Start: 2023-11-03 | End: 2023-11-03 | Stop reason: ALTCHOICE

## 2023-11-03 RX ORDER — METHYLPREDNISOLONE ACETATE 40 MG/ML
INJECTION, SUSPENSION INTRA-ARTICULAR; INTRALESIONAL; INTRAMUSCULAR; SOFT TISSUE PRN
Status: DISCONTINUED | OUTPATIENT
Start: 2023-11-03 | End: 2023-11-03 | Stop reason: ALTCHOICE

## 2023-11-03 RX ORDER — LIDOCAINE HYDROCHLORIDE 5 MG/ML
INJECTION, SOLUTION INFILTRATION; INTRAVENOUS PRN
Status: DISCONTINUED | OUTPATIENT
Start: 2023-11-03 | End: 2023-11-03 | Stop reason: ALTCHOICE

## 2023-11-03 ASSESSMENT — PAIN SCALES - GENERAL
PAINLEVEL_OUTOF10: 0
PAINLEVEL_OUTOF10: 4
PAINLEVEL_OUTOF10: 0
PAINLEVEL_OUTOF10: 0

## 2023-11-03 ASSESSMENT — PAIN DESCRIPTION - ORIENTATION: ORIENTATION: LEFT

## 2023-11-03 ASSESSMENT — PAIN DESCRIPTION - DESCRIPTORS: DESCRIPTORS: ACHING;NUMBNESS

## 2023-11-03 ASSESSMENT — PAIN DESCRIPTION - PAIN TYPE: TYPE: CHRONIC PAIN

## 2023-11-03 ASSESSMENT — PAIN DESCRIPTION - LOCATION: LOCATION: LEG

## 2023-11-03 NOTE — DISCHARGE INSTRUCTIONS
[facility] Patient  Dr. Alonso London Block/Radiofrequency  Home Going Instructions    1-Go home, rest for the remainder of the day  2-Please do not lift over 20 pounds the day of the injection  3-If you received sedation No: alcohol, driving, operating lawn mowers, plows, tractors or other dangerous equipment until next morning. Do not make important decisions or sign legal documents for 24 hours. You may experience light headedness, dizziness, nausea or sleepiness after sedation. Do not stay alone. A responsible adult must be with you for 24 hours. You could be nauseated from the medications you have received. Your IV site may be sore and bruised. 4-No dietary restrictions     5-Resume all medications the same day, blood thinners to be resumed 24 hours after injection if you were instructed to stop any. 6-Keep the surgical site clean and dry, you may shower the next morning and remove the      dressing. 7- No sitz baths, tub baths or hot tubs/swimming for 24 hours. 8- If you have any pain at the injection site(s), application of an ice pack to the area should be       helpful, 20 minutes on/20 minutes off for next 48 hours. 9- Call [facility] immediately at if you develop.   Fever greater than 100.4 F  Have bleeding or drainage from the puncture site  Have progressive Leg/arm numbness and or weakness  Loss of control of bowel and or bladder (wet/soil yourself)  Severe headache with inability to lift head  10-You may return to work the next day

## 2023-11-07 ENCOUNTER — TELEPHONE (OUTPATIENT)
Dept: INTERVENTIONAL RADIOLOGY/VASCULAR | Age: 53
End: 2023-11-07

## 2023-11-07 NOTE — TELEPHONE ENCOUNTER
Spoke with patient and confirmed vascular testing appointment on 11/08/2023 at 8:00 am. Instructed patient to arrive 15 minutes prior to appointment time, Enter through Entrance B, register to right of entrance, and report to Cardiac Services for test. Patient verbalized understanding. Questions answered.

## 2023-11-08 ENCOUNTER — HOSPITAL ENCOUNTER (OUTPATIENT)
Dept: INTERVENTIONAL RADIOLOGY/VASCULAR | Age: 53
Discharge: HOME | End: 2023-11-10
Payer: COMMERCIAL

## 2023-11-08 DIAGNOSIS — I87.2 VENOUS INSUFFICIENCY (CHRONIC) (PERIPHERAL): ICD-10-CM

## 2023-11-08 DIAGNOSIS — I87.2 STASIS DERMATITIS OF BOTH LEGS: ICD-10-CM

## 2023-11-08 PROCEDURE — 93970 EXTREMITY STUDY: CPT

## 2023-11-12 DIAGNOSIS — G47.33 OSA (OBSTRUCTIVE SLEEP APNEA): Primary | ICD-10-CM

## 2023-11-13 NOTE — PROGRESS NOTES
In lab titration ordered given severity of MISHA with profound hypoxia.  Patient will be priority scheduled by the sleep lab.  He will also be seen in sleep clinic in the interim to review HST results and discuss impending PAP therapy.    Yris Gonzalez MD

## 2023-11-14 ENCOUNTER — TELEPHONE (OUTPATIENT)
Dept: ADMINISTRATIVE | Age: 53
End: 2023-11-14

## 2023-11-14 NOTE — TELEPHONE ENCOUNTER
.Patient Appointment Form:      PCP: Rigoberto Pedroza DO  Referring: Rigoberto Pedroza DO    Has the Patient:    Seen a Cardiologist? yes    date:about 8 years ago  Physician:Dr. [facility]    Had a heart catheterization? no    Had heart surgery? no    Had a stress test or nuclear stress test? yes   date: about 8 yrs ago   facility name:  [facility]    Had an echocardiogram? other: unkn    Had a vascular ultrasound?  yes   date: 11/8/2023   facility name:  [facility]    Had a 24/48 heart monitor or extended cardiac event monitor? no    Had recent blood work in the last 6 months? yes    date: 9/13/2023    ordering physician: Dr. Lynda Bryant    Had a pacemaker/ICD/ILR implant? no    Seen an Electrophysiologist? no        Will send records via: with patient      Date & time of appointment:  12/14/2023 0800

## 2023-11-15 ENCOUNTER — TELEPHONE (OUTPATIENT)
Dept: SLEEP CENTER | Age: 53
End: 2023-11-15

## 2023-11-21 ENCOUNTER — HOSPITAL ENCOUNTER (OUTPATIENT)
Dept: SLEEP CENTER | Age: 53
Discharge: HOME | End: 2023-11-21
Payer: COMMERCIAL

## 2023-11-21 DIAGNOSIS — G47.33 OSA (OBSTRUCTIVE SLEEP APNEA): ICD-10-CM

## 2023-11-21 DIAGNOSIS — B00.9 HERPES SIMPLEX: ICD-10-CM

## 2023-11-21 DIAGNOSIS — K21.9 GASTROESOPHAGEAL REFLUX DISEASE WITHOUT ESOPHAGITIS: ICD-10-CM

## 2023-11-21 PROCEDURE — 2700000000 HC OXYGEN THERAPY PER DAY

## 2023-11-21 PROCEDURE — 95811 POLYSOM 6/>YRS CPAP 4/> PARM: CPT

## 2023-11-21 RX ORDER — VALACYCLOVIR HYDROCHLORIDE 1 G/1
TABLET, FILM COATED ORAL
Qty: 12 TABLET | Refills: 3 | Status: SHIPPED | OUTPATIENT
Start: 2023-11-21

## 2023-11-21 RX ORDER — OMEPRAZOLE 40 MG/1
CAPSULE, DELAYED RELEASE ORAL
Qty: 90 CAPSULE | Refills: 3 | Status: SHIPPED | OUTPATIENT
Start: 2023-11-21

## 2023-11-22 ENCOUNTER — E-VISIT (OUTPATIENT)
Dept: PRIMARY CARE CLINIC | Age: 53
End: 2023-11-22
Payer: COMMERCIAL

## 2023-11-22 DIAGNOSIS — J06.9 UPPER RESPIRATORY TRACT INFECTION, UNSPECIFIED TYPE: Primary | ICD-10-CM

## 2023-11-22 PROCEDURE — 99422 OL DIG E/M SVC 11-20 MIN: CPT | Performed by: NURSE PRACTITIONER

## 2023-11-22 RX ORDER — AZITHROMYCIN 250 MG/1
TABLET, FILM COATED ORAL
Qty: 6 TABLET | Refills: 0 | Status: SHIPPED | OUTPATIENT
Start: 2023-11-22 | End: 2023-12-02

## 2023-11-22 ASSESSMENT — LIFESTYLE VARIABLES: SMOKING_STATUS: NO, I'VE NEVER SMOKED

## 2023-12-01 NOTE — PROGRESS NOTES
[facility]        [address]  Dept: [phone]        Follow up Note      Yamel Reed     Date of Visit:  23     CC:  Patient presents for follow up   Chief Complaint   Patient presents with    Follow Up After Procedure     eft Transforaminal epidural steroid injection under fluoroscopic guidance at foraminal level L5 and S1    Lower Back Pain       HPI:    Pain is better. Change in quality of symptoms:no. Medication side effects:not applicable . Recent diagnostic testing:none. Recent interventional procedures:left L5 and S1 TFESI with 50% relief. He has been on anticoagulation medications to include NSAIDS and has not been on herbal supplements. He is not diabetic. Imagin/2023 lumbar MRI w/o -      2023 lumbar xray -     FINDINGS:  Vertebral body heights appear maintained. Scattered endplate and facet joint  degenerative changes with moderate disc space narrowing L3-L4 and L5-S1. IMPRESSION:  Diffuse degenerative changes with moderate disc space narrowing L3-L4 and  L5-S1. Potential Aberrant Drug-Related Behavior:      Urine Drug Screening:    OARRS report:    Past Medical History:   Diagnosis Date    Colitis, ulcerative chronic ([address])     GERD (gastroesophageal reflux disease)     Hypertension     Thyroid disease        Past Surgical History:   Procedure Laterality Date    COLONOSCOPY      ENDOSCOPY, COLON, DIAGNOSTIC      HERNIA REPAIR N/A     UMBILICAL HERNIA REPAIR WITH MESH, LAPAROSCOPIC, ROBOTIC XI ASSISTED performed by Michelle De La O MD at [facility] Left 11/3/2023    LEFT L5 AND S1 TRANSFORAMINAL EPIDURAL STEROID INJECTION performed by Vanessa Scott DO at [address]    clean torn cartilage       Prior to Admission medications    Medication Sig Start Date End Date Taking?  Authorizing Provider   gabapentin (NEURONTIN) 600 MG tablet

## 2023-12-05 ENCOUNTER — OFFICE VISIT (OUTPATIENT)
Dept: PAIN MANAGEMENT | Age: 53
End: 2023-12-05
Payer: COMMERCIAL

## 2023-12-05 VITALS
TEMPERATURE: 97.7 F | DIASTOLIC BLOOD PRESSURE: 84 MMHG | RESPIRATION RATE: 16 BRPM | HEART RATE: 76 BPM | SYSTOLIC BLOOD PRESSURE: 116 MMHG

## 2023-12-05 DIAGNOSIS — G89.4 CHRONIC PAIN SYNDROME: Primary | ICD-10-CM

## 2023-12-05 DIAGNOSIS — M54.16 LUMBAR RADICULOPATHY: ICD-10-CM

## 2023-12-05 PROCEDURE — 3074F SYST BP LT 130 MM HG: CPT | Performed by: PAIN MEDICINE

## 2023-12-05 PROCEDURE — 99213 OFFICE O/P EST LOW 20 MIN: CPT | Performed by: PAIN MEDICINE

## 2023-12-05 PROCEDURE — 3079F DIAST BP 80-89 MM HG: CPT | Performed by: PAIN MEDICINE

## 2023-12-05 PROCEDURE — 99213 OFFICE O/P EST LOW 20 MIN: CPT

## 2023-12-05 RX ORDER — GABAPENTIN 600 MG/1
600 TABLET ORAL 3 TIMES DAILY
COMMUNITY

## 2023-12-05 NOTE — PROGRESS NOTES
Monica Titus advised that procedure was approved for 12/19/2023 and that Alen Ramon should call him a few days before for the pre op call and between 2:00 PM and 4:00 PM  the business day before with the arrival time. Instructed Carlene Estevez to hold ibuprofen for 24 hours, naprosyn for 4 days and any aspirin containing products or fish oil for 7 days. Instructed to call office back if any questions. Carlene Estevez verbalized understanding.     Electronically signed by Mayte Gleason RN on 12/5/2023 at 2:51 PM

## 2023-12-06 DIAGNOSIS — G47.33 OBSTRUCTIVE SLEEP APNEA: Primary | ICD-10-CM

## 2023-12-06 NOTE — PROGRESS NOTES
Patient's titration study interpreted, consistent with severe MISHA treated with BIPAP and supplemental O2 (2L). Therapy ordered. Patient will be notified of results and order will be sent to preferred DME. He will be reminded of insurance requirements for compliance and 30-day window to exchange mask interface. He will follow up in clinic within 2 months.     Ivis Mera, RENETTA - CNP

## 2023-12-29 DIAGNOSIS — E78.2 MIXED HYPERLIPIDEMIA: ICD-10-CM

## 2023-12-29 DIAGNOSIS — E61.1 IRON DEFICIENCY: ICD-10-CM

## 2023-12-29 DIAGNOSIS — R73.01 IFG (IMPAIRED FASTING GLUCOSE): ICD-10-CM

## 2023-12-29 DIAGNOSIS — I87.2 VENOUS INSUFFICIENCY (CHRONIC) (PERIPHERAL): ICD-10-CM

## 2023-12-29 DIAGNOSIS — R53.82 CHRONIC FATIGUE: ICD-10-CM

## 2023-12-29 DIAGNOSIS — E53.8 VITAMIN B 12 DEFICIENCY: ICD-10-CM

## 2023-12-29 LAB
ABSOLUTE IMMATURE GRANULOCYTE: <0.03 K/UL (ref 0–0.58)
ALBUMIN SERPL-MCNC: 4.4 G/DL (ref 3.5–5.2)
ALP BLD-CCNC: 67 U/L (ref 40–129)
ALT SERPL-CCNC: 28 U/L (ref 0–40)
ANION GAP SERPL CALCULATED.3IONS-SCNC: 17 MMOL/L (ref 7–16)
AST SERPL-CCNC: 31 U/L (ref 0–39)
BASOPHILS ABSOLUTE: 0.02 K/UL (ref 0–0.2)
BASOPHILS RELATIVE PERCENT: 0 % (ref 0–2)
BILIRUB SERPL-MCNC: 0.3 MG/DL (ref 0–1.2)
BUN BLDV-MCNC: 13 MG/DL (ref 6–20)
CALCIUM SERPL-MCNC: 9.6 MG/DL (ref 8.6–10.2)
CHLORIDE BLD-SCNC: 93 MMOL/L (ref 98–107)
CHOLESTEROL: 189 MG/DL
CO2: 26 MMOL/L (ref 22–29)
CREAT SERPL-MCNC: 1.3 MG/DL (ref 0.7–1.2)
EOSINOPHILS ABSOLUTE: 0.19 K/UL (ref 0.05–0.5)
EOSINOPHILS RELATIVE PERCENT: 4 % (ref 0–6)
GFR SERPL CREATININE-BSD FRML MDRD: >60 ML/MIN/1.73M2
GLUCOSE BLD-MCNC: 80 MG/DL (ref 74–99)
HBA1C MFR BLD: 5.6 % (ref 4–5.6)
HCT VFR BLD CALC: 48.3 % (ref 37–54)
HDLC SERPL-MCNC: 30 MG/DL
HEMOGLOBIN: 15.5 G/DL (ref 12.5–16.5)
IMMATURE GRANULOCYTES: 0 % (ref 0–5)
LDL CHOLESTEROL: 113 MG/DL
LYMPHOCYTES ABSOLUTE: 1.43 K/UL (ref 1.5–4)
LYMPHOCYTES RELATIVE PERCENT: 30 % (ref 20–42)
MCH RBC QN AUTO: 28.9 PG (ref 26–35)
MCHC RBC AUTO-ENTMCNC: 32.1 G/DL (ref 32–34.5)
MCV RBC AUTO: 89.9 FL (ref 80–99.9)
MONOCYTES ABSOLUTE: 0.73 K/UL (ref 0.1–0.95)
MONOCYTES RELATIVE PERCENT: 15 % (ref 2–12)
NEUTROPHILS ABSOLUTE: 2.35 K/UL (ref 1.8–7.3)
NEUTROPHILS RELATIVE PERCENT: 50 % (ref 43–80)
PDW BLD-RTO: 13.4 % (ref 11.5–15)
PLATELET # BLD: 249 K/UL (ref 130–450)
PMV BLD AUTO: 10.7 FL (ref 7–12)
POTASSIUM SERPL-SCNC: 4.2 MMOL/L (ref 3.5–5)
RBC # BLD: 5.37 M/UL (ref 3.8–5.8)
SODIUM BLD-SCNC: 136 MMOL/L (ref 132–146)
T4 FREE: 1.1 NG/DL (ref 0.9–1.7)
TOTAL PROTEIN: 7.3 G/DL (ref 6.4–8.3)
TRIGL SERPL-MCNC: 230 MG/DL
TSH SERPL DL<=0.05 MIU/L-ACNC: 3.53 UIU/ML (ref 0.27–4.2)
VLDLC SERPL CALC-MCNC: 46 MG/DL
WBC # BLD: 4.7 K/UL (ref 4.5–11.5)

## 2023-12-29 RX ORDER — IBUPROFEN 800 MG/1
TABLET ORAL
Qty: 270 TABLET | Refills: 3 | Status: SHIPPED | OUTPATIENT
Start: 2023-12-29

## 2023-12-30 LAB
FOLATE: 14.2 NG/ML (ref 4.8–24.2)
VITAMIN B-12: 1101 PG/ML (ref 211–946)

## 2024-01-04 ENCOUNTER — OFFICE VISIT (OUTPATIENT)
Dept: FAMILY MEDICINE CLINIC | Age: 54
End: 2024-01-04
Payer: COMMERCIAL

## 2024-01-04 VITALS
DIASTOLIC BLOOD PRESSURE: 80 MMHG | HEIGHT: 76 IN | BODY MASS INDEX: 38.36 KG/M2 | WEIGHT: 315 LBS | SYSTOLIC BLOOD PRESSURE: 128 MMHG | HEART RATE: 63 BPM | RESPIRATION RATE: 18 BRPM | OXYGEN SATURATION: 97 %

## 2024-01-04 DIAGNOSIS — G25.81 RESTLESS LEG SYNDROME: Primary | ICD-10-CM

## 2024-01-04 DIAGNOSIS — R53.82 CHRONIC FATIGUE: ICD-10-CM

## 2024-01-04 DIAGNOSIS — E67.8 HYPERVITAMINOSIS, B COMPLEX: ICD-10-CM

## 2024-01-04 DIAGNOSIS — G25.0 BENIGN ESSENTIAL TREMOR: ICD-10-CM

## 2024-01-04 DIAGNOSIS — E78.2 MIXED HYPERLIPIDEMIA: ICD-10-CM

## 2024-01-04 DIAGNOSIS — E03.9 ACQUIRED HYPOTHYROIDISM: ICD-10-CM

## 2024-01-04 PROCEDURE — 99214 OFFICE O/P EST MOD 30 MIN: CPT | Performed by: FAMILY MEDICINE

## 2024-01-04 PROCEDURE — 3079F DIAST BP 80-89 MM HG: CPT | Performed by: FAMILY MEDICINE

## 2024-01-04 PROCEDURE — 3074F SYST BP LT 130 MM HG: CPT | Performed by: FAMILY MEDICINE

## 2024-01-04 RX ORDER — PRAMIPEXOLE DIHYDROCHLORIDE 0.5 MG/1
0.5 TABLET ORAL NIGHTLY
Qty: 30 TABLET | Refills: 3 | Status: SHIPPED | OUTPATIENT
Start: 2024-01-04 | End: 2024-02-03

## 2024-01-04 NOTE — PROGRESS NOTES
Judgment: Judgment normal.         Assessment / Plan:   Ramon was seen today for 6 month follow-up.    Diagnoses and all orders for this visit:    Restless leg syndrome  -     pramipexole (MIRAPEX) 0.5 MG tablet; Take 1 tablet by mouth nightly    Benign essential tremor  -     External Referral To Neurology    Hypervitaminosis, B complex  -     Vitamin B12 & Folate; Future    Chronic fatigue  -     Comprehensive Metabolic Panel; Future    Acquired hypothyroidism  -     TSH; Future    Mixed hyperlipidemia  -     Lipid Panel; Future        Reviewed healthmaintenance report. Patient is aware of deficiencies and suggested preventative tests.

## 2024-01-07 ASSESSMENT — ENCOUNTER SYMPTOMS
CONSTIPATION: 0
EYE REDNESS: 0
BACK PAIN: 0
CHEST TIGHTNESS: 0
EYE ITCHING: 0
SORE THROAT: 0
BLOOD IN STOOL: 0
TROUBLE SWALLOWING: 0
SINUS PRESSURE: 0
RECTAL PAIN: 0
COLOR CHANGE: 0
CHOKING: 0
PHOTOPHOBIA: 0
VOICE CHANGE: 0
SHORTNESS OF BREATH: 0
WHEEZING: 0
FACIAL SWELLING: 0
VOMITING: 0
EYE PAIN: 0
EYE DISCHARGE: 0
COUGH: 0
ANAL BLEEDING: 0
ABDOMINAL PAIN: 0
ABDOMINAL DISTENTION: 0
DIARRHEA: 0
APNEA: 0
STRIDOR: 0
RHINORRHEA: 0
NAUSEA: 0

## 2024-01-10 ENCOUNTER — OFFICE VISIT (OUTPATIENT)
Dept: CARDIOLOGY CLINIC | Age: 54
End: 2024-01-10

## 2024-01-10 VITALS
RESPIRATION RATE: 16 BRPM | SYSTOLIC BLOOD PRESSURE: 122 MMHG | BODY MASS INDEX: 38.36 KG/M2 | WEIGHT: 315 LBS | HEIGHT: 76 IN | DIASTOLIC BLOOD PRESSURE: 82 MMHG

## 2024-01-10 DIAGNOSIS — R00.2 PALPITATIONS: Primary | ICD-10-CM

## 2024-01-10 NOTE — PROGRESS NOTES
[facility] consult  Dr. Joss Nash      Reason for Consult: Palpitations  Referring Physician: Home Narvaez DO     CHIEF COMPLAINT:   Chief Complaint   Patient presents with    Palpitations       HISTORY OF PRESENT ILLNESS:   Patient is 54 years old male with history of hypertension, sleep apnea, who was referred to cardiology for evaluation of cardiac arrhythmia noted on Apple Watch  Patient was alerted by his Apple Watch that he might have atrial fibrillation, that was during his sleep, no recurrence, no LOC during daytime, denies any palpitations, no lightheadedness or dizziness, no pedal edema, no PND, no orthopnea, no syncope, no presyncopal episode.    Past Medical History:   Diagnosis Date    Colitis, ulcerative chronic (HCC)     GERD (gastroesophageal reflux disease)     Hypertension     Low back pain     MISHA on CPAP     Thyroid disease          Past Surgical History:   Procedure Laterality Date    COLONOSCOPY      ENDOSCOPY, COLON, DIAGNOSTIC      HERNIA REPAIR N/A 7/14/2020    UMBILICAL HERNIA REPAIR WITH MESH, LAPAROSCOPIC, ROBOTIC XI ASSISTED performed by Stefano Laguerre MD at [facility] OR    PAIN MANAGEMENT PROCEDURE Left 11/3/2023    LEFT L5 AND S1 TRANSFORAMINAL EPIDURAL STEROID INJECTION performed by Basilia Hill DO at [facility] OR    PAIN MANAGEMENT PROCEDURE Bilateral 12/19/2023    BILATERAL L5 TRANSFORAMINAL EPIDURAL STEROID INJECTION performed by Basilia Hill DO at [facility] OR    SHOULDER SURGERY Right 1996    clean torn cartilage         Current Outpatient Medications   Medication Sig Dispense Refill    pramipexole (MIRAPEX) 0.5 MG tablet Take 1 tablet by mouth nightly 30 tablet 3    ibuprofen (ADVIL;MOTRIN) 800 MG tablet TAKE 1 TABLET BY MOUTH 3 TIMES  DAILY WITH MEALS 270 tablet 3    gabapentin (NEURONTIN) 600 MG tablet Take 1 tablet by mouth 3 times daily.      omeprazole (PRILOSEC) 40 MG delayed release capsule TAKE 1 CAPSULE BY MOUTH IN THE  MORNING BEFORE BREAKFAST 90 capsule 3

## 2024-01-17 RX ORDER — DEXAMETHASONE 4 MG/1
4 TABLET ORAL 2 TIMES DAILY WITH MEALS
Qty: 20 TABLET | Refills: 0 | Status: SHIPPED | OUTPATIENT
Start: 2024-01-17 | End: 2024-01-27

## 2024-01-17 NOTE — PROGRESS NOTES
[facility]        [address] [address]  Dept: [phone]        Follow up Note      Ramon K Rosales     Date of Visit:  24     CC:  Patient presents for follow up   Chief Complaint   Patient presents with    Follow-up     BILATERAL L5 TRANSFORAMINAL EPIDURAL STEROID INJECTION       HPI:    Pain is better.  Change in quality of symptoms:no.    Medication side effects:not applicable .   Recent diagnostic testing:none.   Recent interventional procedures:bilateral L5 TFESI with 90% relief.    He has been on anticoagulation medications to include NSAIDS and has not been on herbal supplements.  He is not diabetic.     Imagin/2023 lumbar MRI w/o -      2023 lumbar xray -     FINDINGS:  Vertebral body heights appear maintained.  Scattered endplate and facet joint  degenerative changes with moderate disc space narrowing L3-L4 and L5-S1.     IMPRESSION:  Diffuse degenerative changes with moderate disc space narrowing L3-L4 and  L5-S1.       Potential Aberrant Drug-Related Behavior:      Urine Drug Screening:    OARRS report:    Past Medical History:   Diagnosis Date    Colitis, ulcerative chronic (HCC)     GERD (gastroesophageal reflux disease)     Hypertension     Low back pain     MISHA on CPAP     Thyroid disease        Past Surgical History:   Procedure Laterality Date    COLONOSCOPY      ENDOSCOPY, COLON, DIAGNOSTIC      HERNIA REPAIR N/A 2020    UMBILICAL HERNIA REPAIR WITH MESH, LAPAROSCOPIC, ROBOTIC XI ASSISTED performed by Stefano Laguerre MD at [facility] OR    PAIN MANAGEMENT PROCEDURE Left 11/3/2023    LEFT L5 AND S1 TRANSFORAMINAL EPIDURAL STEROID INJECTION performed by Basilia Hill DO at [facility] OR    PAIN MANAGEMENT PROCEDURE Bilateral 2023    BILATERAL L5 TRANSFORAMINAL EPIDURAL STEROID INJECTION performed by Basilia Hill DO at [facility] OR    SHOULDER SURGERY Right     clean torn cartilage       Prior to Admission medications

## 2024-01-18 ENCOUNTER — OFFICE VISIT (OUTPATIENT)
Dept: PAIN MANAGEMENT | Age: 54
End: 2024-01-18
Payer: COMMERCIAL

## 2024-01-18 VITALS
RESPIRATION RATE: 18 BRPM | HEIGHT: 76 IN | SYSTOLIC BLOOD PRESSURE: 122 MMHG | DIASTOLIC BLOOD PRESSURE: 75 MMHG | OXYGEN SATURATION: 92 % | WEIGHT: 315 LBS | BODY MASS INDEX: 38.36 KG/M2 | TEMPERATURE: 98.1 F | HEART RATE: 75 BPM

## 2024-01-18 DIAGNOSIS — M51.9 LUMBAR DISC DISORDER: ICD-10-CM

## 2024-01-18 DIAGNOSIS — M54.41 CHRONIC BILATERAL LOW BACK PAIN WITH BILATERAL SCIATICA: ICD-10-CM

## 2024-01-18 DIAGNOSIS — M54.16 LUMBAR RADICULOPATHY: ICD-10-CM

## 2024-01-18 DIAGNOSIS — G89.29 CHRONIC BILATERAL LOW BACK PAIN WITH BILATERAL SCIATICA: ICD-10-CM

## 2024-01-18 DIAGNOSIS — M48.062 SPINAL STENOSIS OF LUMBAR REGION WITH NEUROGENIC CLAUDICATION: ICD-10-CM

## 2024-01-18 DIAGNOSIS — M54.42 CHRONIC BILATERAL LOW BACK PAIN WITH BILATERAL SCIATICA: ICD-10-CM

## 2024-01-18 DIAGNOSIS — G89.4 CHRONIC PAIN SYNDROME: Primary | ICD-10-CM

## 2024-01-18 PROCEDURE — 3078F DIAST BP <80 MM HG: CPT | Performed by: PAIN MEDICINE

## 2024-01-18 PROCEDURE — 99213 OFFICE O/P EST LOW 20 MIN: CPT | Performed by: PAIN MEDICINE

## 2024-01-18 PROCEDURE — 3074F SYST BP LT 130 MM HG: CPT | Performed by: PAIN MEDICINE

## 2024-01-18 NOTE — PATIENT INSTRUCTIONS
[employer] - slant board for calf stretching (twice a day, both with straight knee and slightly bent knee)

## 2024-01-18 NOTE — PROGRESS NOTES
Ramon Rosales presents to the [facility] on 1/18/2024. Ramon is complaining of pain back. The pain is intermittent. The pain is described as aching and dull. Pain is rated on his best day at a 0, on his worst day at a 3, and on average at a 1 on the VAS scale. He took his last dose of Neurontin and 800 mg Ibuprofen  today.    Any procedures since your last visit: Yes, BILATERAL L5 TRANSFORAMINAL EPIDURAL STEROID INJECTION with 90 % relief.    He is  on NSAIDS and  is not on anticoagulation medications to include none   Pacemaker or defibrillator: No   Medication Contract and Consent for Opioid Use Documents Filed        No documents found                       There were no vitals taken for this visit.     No LMP for male patient.

## 2024-02-03 DIAGNOSIS — R60.9 EDEMA, UNSPECIFIED TYPE: ICD-10-CM

## 2024-02-03 DIAGNOSIS — M79.89 LEG SWELLING: ICD-10-CM

## 2024-02-04 DIAGNOSIS — F41.9 ANXIETY: ICD-10-CM

## 2024-02-04 DIAGNOSIS — E03.9 ACQUIRED HYPOTHYROIDISM: ICD-10-CM

## 2024-02-04 DIAGNOSIS — K21.9 GASTROESOPHAGEAL REFLUX DISEASE WITHOUT ESOPHAGITIS: ICD-10-CM

## 2024-02-04 DIAGNOSIS — F51.01 PRIMARY INSOMNIA: ICD-10-CM

## 2024-02-04 DIAGNOSIS — R60.9 EDEMA, UNSPECIFIED TYPE: ICD-10-CM

## 2024-02-04 DIAGNOSIS — G25.0 BENIGN ESSENTIAL TREMOR: ICD-10-CM

## 2024-02-04 DIAGNOSIS — I87.2 VENOUS INSUFFICIENCY (CHRONIC) (PERIPHERAL): ICD-10-CM

## 2024-02-04 DIAGNOSIS — M79.89 LEG SWELLING: ICD-10-CM

## 2024-02-04 DIAGNOSIS — I10 BENIGN ESSENTIAL HTN: ICD-10-CM

## 2024-02-05 RX ORDER — PROPRANOLOL HYDROCHLORIDE 80 MG/1
80 CAPSULE, EXTENDED RELEASE ORAL DAILY
Qty: 90 CAPSULE | Refills: 3 | Status: SHIPPED | OUTPATIENT
Start: 2024-02-05

## 2024-02-05 RX ORDER — BUSPIRONE HYDROCHLORIDE 7.5 MG/1
7.5 TABLET ORAL 2 TIMES DAILY
Qty: 180 TABLET | Refills: 5 | Status: SHIPPED | OUTPATIENT
Start: 2024-02-05

## 2024-02-05 RX ORDER — LEVOTHYROXINE SODIUM 0.2 MG/1
200 TABLET ORAL DAILY
Qty: 90 TABLET | Refills: 3 | Status: SHIPPED | OUTPATIENT
Start: 2024-02-05

## 2024-02-05 RX ORDER — TRAZODONE HYDROCHLORIDE 50 MG/1
TABLET ORAL
Qty: 60 TABLET | Refills: 5 | Status: SHIPPED | OUTPATIENT
Start: 2024-02-05

## 2024-02-05 RX ORDER — OMEPRAZOLE 40 MG/1
40 CAPSULE, DELAYED RELEASE ORAL DAILY
Qty: 90 CAPSULE | Refills: 3 | Status: SHIPPED | OUTPATIENT
Start: 2024-02-05

## 2024-02-05 RX ORDER — IBUPROFEN 800 MG/1
TABLET ORAL
Qty: 270 TABLET | Refills: 3 | Status: SHIPPED | OUTPATIENT
Start: 2024-02-05

## 2024-02-05 RX ORDER — METOLAZONE 5 MG/1
5 TABLET ORAL DAILY
Qty: 30 TABLET | Refills: 3 | Status: SHIPPED | OUTPATIENT
Start: 2024-02-05

## 2024-02-05 RX ORDER — POTASSIUM CHLORIDE 20 MEQ/1
20 TABLET, EXTENDED RELEASE ORAL DAILY
Qty: 30 TABLET | Refills: 5 | Status: SHIPPED | OUTPATIENT
Start: 2024-02-05

## 2024-02-05 RX ORDER — FUROSEMIDE 40 MG/1
40 TABLET ORAL DAILY
Qty: 30 TABLET | Refills: 4 | OUTPATIENT
Start: 2024-02-05

## 2024-02-16 DIAGNOSIS — M54.16 LUMBAR RADICULOPATHY: ICD-10-CM

## 2024-02-16 RX ORDER — GABAPENTIN 300 MG/1
300 CAPSULE ORAL 3 TIMES DAILY
Qty: 270 CAPSULE | Refills: 1 | Status: SHIPPED | OUTPATIENT
Start: 2024-02-16 | End: 2024-08-14

## 2024-02-16 RX ORDER — GABAPENTIN 600 MG/1
600 TABLET ORAL 3 TIMES DAILY
Qty: 90 TABLET | Refills: 3 | Status: SHIPPED | OUTPATIENT
Start: 2024-02-16 | End: 2025-01-29

## 2024-02-20 DIAGNOSIS — I87.2 VENOUS INSUFFICIENCY (CHRONIC) (PERIPHERAL): ICD-10-CM

## 2024-02-20 RX ORDER — METOLAZONE 5 MG/1
5 TABLET ORAL DAILY
Qty: 30 TABLET | Refills: 3 | OUTPATIENT
Start: 2024-02-20

## 2024-03-01 DIAGNOSIS — R60.9 EDEMA, UNSPECIFIED TYPE: ICD-10-CM

## 2024-03-01 DIAGNOSIS — M79.89 LEG SWELLING: ICD-10-CM

## 2024-03-01 RX ORDER — POTASSIUM CHLORIDE 20 MEQ/1
20 TABLET, EXTENDED RELEASE ORAL DAILY
Qty: 30 TABLET | Refills: 5 | OUTPATIENT
Start: 2024-03-01

## 2024-03-20 ENCOUNTER — OFFICE VISIT (OUTPATIENT)
Dept: PAIN MANAGEMENT | Age: 54
End: 2024-03-20
Payer: COMMERCIAL

## 2024-03-20 ENCOUNTER — PREP FOR PROCEDURE (OUTPATIENT)
Dept: PAIN MANAGEMENT | Age: 54
End: 2024-03-20

## 2024-03-20 VITALS
HEART RATE: 61 BPM | TEMPERATURE: 97.3 F | WEIGHT: 315 LBS | SYSTOLIC BLOOD PRESSURE: 114 MMHG | DIASTOLIC BLOOD PRESSURE: 82 MMHG | RESPIRATION RATE: 16 BRPM | BODY MASS INDEX: 38.36 KG/M2 | OXYGEN SATURATION: 96 % | HEIGHT: 76 IN

## 2024-03-20 DIAGNOSIS — G89.4 CHRONIC PAIN SYNDROME: Primary | ICD-10-CM

## 2024-03-20 DIAGNOSIS — M54.16 LUMBAR RADICULOPATHY: ICD-10-CM

## 2024-03-20 DIAGNOSIS — M48.062 SPINAL STENOSIS OF LUMBAR REGION WITH NEUROGENIC CLAUDICATION: ICD-10-CM

## 2024-03-20 DIAGNOSIS — M54.41 CHRONIC RIGHT-SIDED LOW BACK PAIN WITH RIGHT-SIDED SCIATICA: ICD-10-CM

## 2024-03-20 DIAGNOSIS — G89.29 CHRONIC RIGHT-SIDED LOW BACK PAIN WITH RIGHT-SIDED SCIATICA: ICD-10-CM

## 2024-03-20 DIAGNOSIS — M51.9 LUMBAR DISC DISORDER: ICD-10-CM

## 2024-03-20 DIAGNOSIS — M54.16 LUMBAR RADICULOPATHY: Primary | ICD-10-CM

## 2024-03-20 PROCEDURE — 3079F DIAST BP 80-89 MM HG: CPT | Performed by: PAIN MEDICINE

## 2024-03-20 PROCEDURE — 3074F SYST BP LT 130 MM HG: CPT | Performed by: PAIN MEDICINE

## 2024-03-20 PROCEDURE — 99213 OFFICE O/P EST LOW 20 MIN: CPT | Performed by: PAIN MEDICINE

## 2024-03-20 PROCEDURE — 99213 OFFICE O/P EST LOW 20 MIN: CPT

## 2024-03-20 RX ORDER — METAXALONE 800 MG/1
800 TABLET ORAL 3 TIMES DAILY
COMMUNITY
Start: 2024-01-12

## 2024-03-20 RX ORDER — TOPIRAMATE 25 MG/1
TABLET ORAL
COMMUNITY
Start: 2024-02-27

## 2024-03-20 NOTE — PROGRESS NOTES
[facility]        [address] [address]  Dept: [phone]        Follow up Note      Ramon K Rosales     Date of Visit:  24     CC:  Patient presents for follow up   Chief Complaint   Patient presents with    Follow-up     Low back pain       HPI:    Pain is worse.  Change in quality of symptoms:no.    Medication side effects:not applicable .   Recent diagnostic testing:none.   Recent interventional procedures:none.    He has been on anticoagulation medications to include NSAIDS and has not been on herbal supplements.  He is not diabetic.     Imagin/2023 lumbar MRI w/o -      2023 lumbar xray -     FINDINGS:  Vertebral body heights appear maintained.  Scattered endplate and facet joint  degenerative changes with moderate disc space narrowing L3-L4 and L5-S1.     IMPRESSION:  Diffuse degenerative changes with moderate disc space narrowing L3-L4 and  L5-S1.       Potential Aberrant Drug-Related Behavior:      Urine Drug Screening:    OARRS report:    Past Medical History:   Diagnosis Date    Colitis, ulcerative chronic (HCC)     GERD (gastroesophageal reflux disease)     Hypertension     Low back pain     MISHA on CPAP     Thyroid disease        Past Surgical History:   Procedure Laterality Date    COLONOSCOPY      ENDOSCOPY, COLON, DIAGNOSTIC      HERNIA REPAIR N/A 2020    UMBILICAL HERNIA REPAIR WITH MESH, LAPAROSCOPIC, ROBOTIC XI ASSISTED performed by Stefano Laguerre MD at [facility] OR    PAIN MANAGEMENT PROCEDURE Left 11/3/2023    LEFT L5 AND S1 TRANSFORAMINAL EPIDURAL STEROID INJECTION [facility]    PAIN MANAGEMENT PROCEDURE Bilateral 2023    BILATERAL L5 TRANSFORAMINAL EPIDURAL STEROID INJECTION performed by Basilia Hill DO at [facility] OR    SHOULDER SURGERY Right     clean torn cartilage       Prior to Admission medications    Medication Sig Start Date End Date Taking? Authorizing Provider

## 2024-03-20 NOTE — PROGRESS NOTES
Ramon Rosales presents to the [facility] on 3/20/2024. Ramon is complaining of pain low back. The pain is intermittent. The pain is described as aching and sharp. Pain is rated on his best day at a 0, on his worst day at a 4, and on average at a 3 on the VAS scale. He took his last dose of Neurontin today, motrin and tylenol yesterday.     Any procedures since your last visit: No.    Pacemaker or defibrillator: No.    He is  on NSAIDS and is not on anticoagulation medications to include none.     Medication Contract and Consent for Opioid Use Documents Filed        No documents found                    /82   Pulse 61   Temp 97.3 °F (36.3 °C) (Infrared)   Resp 16   Ht 1.93 m (6' 4\")   Wt (!) 166 kg (365 lb 15.4 oz)   SpO2 96%   BMI 44.55 kg/m²      No LMP for male patient.

## 2024-03-26 ENCOUNTER — TELEPHONE (OUTPATIENT)
Dept: PAIN MANAGEMENT | Age: 54
End: 2024-03-26

## 2024-03-26 DIAGNOSIS — M54.16 LUMBAR RADICULOPATHY: Primary | ICD-10-CM

## 2024-03-26 NOTE — TELEPHONE ENCOUNTER
Call to Ramon Rosales and message left that procedure was approved for 4/2/2024 and that [facility] should call him a few days before for the pre op call and between 2:00 PM and 4:00 PM  the business day before with the arrival time. Instructed Ramon to hold ibuprofen for 24 hours, naprosyn for 4 days and any aspirin containing products or fish oil for 7 days. Instructed to call office back. Advised that if they do not return the call it may result in their procedure being delayed.    Electronically signed by Nasim Olvera RN on 3/26/2024 at 1:49 PM

## 2024-03-27 NOTE — PROGRESS NOTES
[facility]NAG[facility]  INSTRUCTIONS  ...........................................................................................................................................     [x] Parking the day of Surgery is located in the Main Entrance lot.  Upon entering the door, make immediate right into the surgery reception room    [x]  Bring photo ID and insurance card     [x] You may have a light breakfast day of procedure    [x]  Wear loose comfortable clothing    [x]  Please follow instructions for medications as given per Dr's office    [x] You can expect a call the business day prior to procedure to notify you of your arrival time     [x] Please arrange for     []  Other instructions

## 2024-04-02 ENCOUNTER — HOSPITAL ENCOUNTER (OUTPATIENT)
Age: 54
Setting detail: OUTPATIENT SURGERY
Discharge: HOME | End: 2024-04-02
Attending: PAIN MEDICINE | Admitting: PAIN MEDICINE
Payer: COMMERCIAL

## 2024-04-02 ENCOUNTER — HOSPITAL ENCOUNTER (OUTPATIENT)
Dept: GENERAL RADIOLOGY | Age: 54
Setting detail: OUTPATIENT SURGERY
Discharge: HOME | End: 2024-04-04
Attending: PAIN MEDICINE
Payer: COMMERCIAL

## 2024-04-02 VITALS
OXYGEN SATURATION: 100 % | SYSTOLIC BLOOD PRESSURE: 136 MMHG | RESPIRATION RATE: 14 BRPM | DIASTOLIC BLOOD PRESSURE: 75 MMHG | BODY MASS INDEX: 39.17 KG/M2 | HEIGHT: 75 IN | HEART RATE: 79 BPM | TEMPERATURE: 98 F | WEIGHT: 315 LBS

## 2024-04-02 DIAGNOSIS — R52 PAIN MANAGEMENT: ICD-10-CM

## 2024-04-02 PROCEDURE — 2500000003 HC RX 250 WO HCPCS: Performed by: PAIN MEDICINE

## 2024-04-02 PROCEDURE — 64483 NJX AA&/STRD TFRM EPI L/S 1: CPT | Performed by: PAIN MEDICINE

## 2024-04-02 PROCEDURE — 7100000011 HC PHASE II RECOVERY - ADDTL 15 MIN: Performed by: PAIN MEDICINE

## 2024-04-02 PROCEDURE — 6360000002 HC RX W HCPCS: Performed by: PAIN MEDICINE

## 2024-04-02 PROCEDURE — 6360000004 HC RX CONTRAST MEDICATION: Performed by: PAIN MEDICINE

## 2024-04-02 PROCEDURE — 3600000002 HC SURGERY LEVEL 2 BASE: Performed by: PAIN MEDICINE

## 2024-04-02 PROCEDURE — 2709999900 HC NON-CHARGEABLE SUPPLY: Performed by: PAIN MEDICINE

## 2024-04-02 PROCEDURE — 7100000010 HC PHASE II RECOVERY - FIRST 15 MIN: Performed by: PAIN MEDICINE

## 2024-04-02 RX ORDER — LIDOCAINE HYDROCHLORIDE 5 MG/ML
INJECTION, SOLUTION INFILTRATION; INTRAVENOUS PRN
Status: DISCONTINUED | OUTPATIENT
Start: 2024-04-02 | End: 2024-04-02 | Stop reason: ALTCHOICE

## 2024-04-02 RX ORDER — METHYLPREDNISOLONE ACETATE 40 MG/ML
INJECTION, SUSPENSION INTRA-ARTICULAR; INTRALESIONAL; INTRAMUSCULAR; SOFT TISSUE PRN
Status: DISCONTINUED | OUTPATIENT
Start: 2024-04-02 | End: 2024-04-02 | Stop reason: ALTCHOICE

## 2024-04-02 RX ORDER — IOPAMIDOL 612 MG/ML
INJECTION, SOLUTION INTRATHECAL PRN
Status: DISCONTINUED | OUTPATIENT
Start: 2024-04-02 | End: 2024-04-02 | Stop reason: ALTCHOICE

## 2024-04-02 ASSESSMENT — PAIN SCALES - GENERAL: PAINLEVEL_OUTOF10: 0

## 2024-04-02 ASSESSMENT — PAIN - FUNCTIONAL ASSESSMENT: PAIN_FUNCTIONAL_ASSESSMENT: 0-10

## 2024-04-02 NOTE — H&P
[facility]        [address] [place] [address]  Dept: [phone]    Procedure History & Physical      Ramon K Bobby     HPI:    Patient  is here for LBP RLE pain for right L5 TFESI  Labs/imaging studies reviewed   All question and concerns addressed including R/B/A associated with the procedure    Past Medical History:   Diagnosis Date    Colitis, ulcerative chronic (HCC)     GERD (gastroesophageal reflux disease)     Hypertension     Low back pain     MISHA on CPAP     Thyroid disease        Past Surgical History:   Procedure Laterality Date    COLONOSCOPY      ENDOSCOPY, COLON, DIAGNOSTIC      HERNIA REPAIR N/A 7/14/2020    UMBILICAL HERNIA REPAIR WITH MESH, LAPAROSCOPIC, ROBOTIC XI ASSISTED performed by Stefano Laguerre MD at [facility] OR    PAIN MANAGEMENT PROCEDURE Left 11/3/2023    LEFT L5 AND S1 TRANSFORAMINAL EPIDURAL STEROID INJECTION performed by Basilia Hill DO at [facility] OR    PAIN MANAGEMENT PROCEDURE Bilateral 12/19/2023    BILATERAL L5 TRANSFORAMINAL EPIDURAL STEROID INJECTION performed by Basilia Hill DO at [facility] OR    SHOULDER SURGERY Right 1996    clean torn cartilage       Prior to Admission medications    Medication Sig Start Date End Date Taking? Authorizing Provider   metaxalone (SKELAXIN) 800 MG tablet Take 1 tablet by mouth 3 times daily 1/12/24   ProviderJosh MD   topiramate (TOPAMAX) 25 MG tablet take 3 tablets by mouth at bedtime 2/27/24   ProviderJosh MD   gabapentin (NEURONTIN) 300 MG capsule Take 1 capsule by mouth 3 times daily for 180 days. Intended supply: 90 days 2/16/24 8/14/24  Home Narvaez DO   gabapentin (NEURONTIN) 600 MG tablet Take 1 tablet by mouth 3 times daily for 348 days. 2/16/24 1/29/25  Home Narvaez DO   levothyroxine (SYNTHROID) 200 MCG tablet Take 1 tablet by mouth Daily 2/5/24   Home Narvaez DO   propranolol (INDERAL LA) 80 MG extended release capsule Take 1 capsule by mouth daily 2/5/24   Brice

## 2024-04-02 NOTE — OP NOTE
2024    Patient: Ramon Rosales  :  1970  Age:  54 y.o.  Sex:  male     PRE-OPERATIVE DIAGNOSIS: Lumbar disc displacement, lumbar neural foraminal stenosis, lumbar radiculopathy.     POST-OPERATIVE DIAGNOSIS: Same.    PROCEDURE: Right Transforaminal epidural steroid injection under fluoroscopic guidance at foraminal level L5.    SURGEON: KATHY Hill D.O.    ANESTHESIA: local    ESTIMATED BLOOD LOSS: None.  ______________________________________________________________________  BRIEF HISTORY: Ramon Rosales comes in today for the Right transforaminal epidural steroid injection under fluoroscopic guidance at foraminal level L5. The potential complications of this procedure were discussed with him again today.  He has elected to undergo the aforementioned procedure.     Ramon’s complete History & Physical examination were reviewed in depth, a copy of which is in the chart.      DESCRIPTION OF PROCEDURE:    After confirming written and informed consent, a time-out was performed and Ramon’s name and date of birth, the procedure to be performed as well as the plan for the location of the needle insertion were confirmed.    The patient was brought into the procedure room and placed in the prone position on the fluoroscopy table. Standard monitors were placed and vital signs were observed throughout the procedure. The area of the lumbar spine was prepped with chloraprep and draped in a sterile manner. The vertebral body was identified with AP fluoroscopy. An oblique view was obtained to better visualize the inferior junction of the pedicle and transverse process . The 6 o'clock position of the pedicle was marked and identified. The skin and subcutaneous tissue were anesthetized with 0.5% lidocaine. A # 22 gauge pencil point needle was directed toward the targeted point under fluoroscopy until bone was contacted. The needle was then walked inferiorly until the neural foramen was entered . A lateral fluoroscopic view

## 2024-04-02 NOTE — DISCHARGE INSTRUCTIONS
[facility] Pain Management Department  [place] [facility]-[phone]  Dr. Pee Hill   Post-Pain Block/Radiofrequency  Home Going Instructions    1-Go home, rest for the remainder of the day  2-Please do not lift over 20 pounds the day of the injection  3-If you received sedation No: alcohol, driving, operating lawn mowers, plows, tractors or other dangerous equipment until next morning. Do not make important decisions or sign legal documents for 24 hours. You may experience light headedness, dizziness, nausea or sleepiness after sedation. Do not stay alone. A responsible adult must be with you for 24 hours. You could be nauseated from the medications you have received. Your IV site may be sore and bruised.    4-No dietary restrictions     5-Resume all medications the same day, blood thinners to be resumed 24 hours after injection if you were instructed to stop any.    6-Keep the surgical site clean and dry, you may shower the next morning and remove the      dressing.     7- No sitz baths, tub baths or hot tubs/swimming for 24 hours.       8- If you have any pain at the injection site(s), application of an ice pack to the area should be       helpful, 20 minutes on/20 minutes off for next 48 hours.  9- Call [facility] Pain Management immediately at if you develop.  Fever greater than 100.4 F  Have bleeding or drainage from the puncture site  Have progressive Leg/arm numbness and or weakness  Loss of control of bowel and or bladder (wet/soil yourself)  Severe headache with inability to lift head  10-You may return to work the next day

## 2024-04-16 ENCOUNTER — E-VISIT (OUTPATIENT)
Dept: PRIMARY CARE CLINIC | Age: 54
End: 2024-04-16
Payer: COMMERCIAL

## 2024-04-16 ENCOUNTER — OFFICE VISIT (OUTPATIENT)
Dept: SLEEP CENTER | Age: 54
End: 2024-04-16
Payer: COMMERCIAL

## 2024-04-16 VITALS
WEIGHT: 315 LBS | RESPIRATION RATE: 16 BRPM | BODY MASS INDEX: 39.17 KG/M2 | SYSTOLIC BLOOD PRESSURE: 113 MMHG | HEIGHT: 75 IN | OXYGEN SATURATION: 97 % | HEART RATE: 70 BPM | DIASTOLIC BLOOD PRESSURE: 77 MMHG

## 2024-04-16 DIAGNOSIS — G47.33 OBSTRUCTIVE SLEEP APNEA: Primary | ICD-10-CM

## 2024-04-16 DIAGNOSIS — G47.34 NOCTURNAL OXYGEN DESATURATION: ICD-10-CM

## 2024-04-16 DIAGNOSIS — J06.9 UPPER RESPIRATORY TRACT INFECTION, UNSPECIFIED TYPE: Primary | ICD-10-CM

## 2024-04-16 DIAGNOSIS — E66.9 OBESITY, UNSPECIFIED CLASSIFICATION, UNSPECIFIED OBESITY TYPE, UNSPECIFIED WHETHER SERIOUS COMORBIDITY PRESENT: ICD-10-CM

## 2024-04-16 PROCEDURE — 99204 OFFICE O/P NEW MOD 45 MIN: CPT | Performed by: NURSE PRACTITIONER

## 2024-04-16 PROCEDURE — 3074F SYST BP LT 130 MM HG: CPT | Performed by: NURSE PRACTITIONER

## 2024-04-16 PROCEDURE — 99422 OL DIG E/M SVC 11-20 MIN: CPT | Performed by: NURSE PRACTITIONER

## 2024-04-16 PROCEDURE — 3078F DIAST BP <80 MM HG: CPT | Performed by: NURSE PRACTITIONER

## 2024-04-16 RX ORDER — AZITHROMYCIN 250 MG/1
TABLET, FILM COATED ORAL
Qty: 6 TABLET | Refills: 0 | Status: SHIPPED | OUTPATIENT
Start: 2024-04-16 | End: 2024-04-26

## 2024-04-16 RX ORDER — METHYLPREDNISOLONE 4 MG/1
TABLET ORAL
Qty: 1 KIT | Refills: 0 | Status: SHIPPED | OUTPATIENT
Start: 2024-04-16 | End: 2024-04-22

## 2024-04-16 ASSESSMENT — SLEEP AND FATIGUE QUESTIONNAIRES
HOW LIKELY ARE YOU TO NOD OFF OR FALL ASLEEP WHILE SITTING QUIETLY AFTER LUNCH WITHOUT ALCOHOL: SLIGHT CHANCE OF DOZING
HOW LIKELY ARE YOU TO NOD OFF OR FALL ASLEEP WHILE SITTING INACTIVE IN A PUBLIC PLACE: MODERATE CHANCE OF DOZING
HOW LIKELY ARE YOU TO NOD OFF OR FALL ASLEEP WHILE SITTING AND TALKING TO SOMEONE: SLIGHT CHANCE OF DOZING
HOW LIKELY ARE YOU TO NOD OFF OR FALL ASLEEP WHEN YOU ARE A PASSENGER IN A CAR FOR AN HOUR WITHOUT A BREAK: HIGH CHANCE OF DOZING
ESS TOTAL SCORE: 16
HOW LIKELY ARE YOU TO NOD OFF OR FALL ASLEEP WHILE WATCHING TV: MODERATE CHANCE OF DOZING
HOW LIKELY ARE YOU TO NOD OFF OR FALL ASLEEP WHILE LYING DOWN TO REST IN THE AFTERNOON WHEN CIRCUMSTANCES PERMIT: HIGH CHANCE OF DOZING
HOW LIKELY ARE YOU TO NOD OFF OR FALL ASLEEP WHILE SITTING AND READING: SLIGHT CHANCE OF DOZING
HOW LIKELY ARE YOU TO NOD OFF OR FALL ASLEEP IN A CAR, WHILE STOPPED FOR A FEW MINUTES IN TRAFFIC: HIGH CHANCE OF DOZING

## 2024-04-16 ASSESSMENT — LIFESTYLE VARIABLES: SMOKING_STATUS: NO, I'VE NEVER SMOKED

## 2024-04-16 NOTE — PROGRESS NOTES
Reviewed questionnaire    Reviewed meds/allergies    Dx URI    Plan Rx given for zpack and medrol dose pack, follow up with PCP if no improvement    Time spent on visit 11 min

## 2024-04-16 NOTE — PROGRESS NOTES
[facility] Sleep Medicine    Patient Name: Ramon Rosales  Age: 54 y.o.   : 1970    Date of Visit: 24    HPI   Ramon Rosales is a 54 y.o. male with  has a past medical history of Colitis, ulcerative chronic (HCC), GERD (gastroesophageal reflux disease), Hypertension, Low back pain, MISHA on CPAP, and Thyroid disease. He presents as a new patient to Sleep Clinic, referred by Dr. Narvaez, for Sleep Apnea   .    Patient presents to establish with sleep medicine for management of MISHA.  Originally diagnosed in the early ,  was using a CPAP for years until it broke and he sought out new device.    Patient had a home sleep study 2023 that showed very severe MISHA with an AHI of 92 and significant hypoxia.  A titration followed, found best result on BiPAP  with 2 L of supplemental oxygen.  Received auto BiPAP and O2 in January.    Patient notes benefit using BiPAP versus his old CPAP, sleeping has improved, he denies snoring, witnessed apneas, a.m. headaches, drowsy driving, or difficulty with memory--all symptoms he previously experienced.    Patient sleeps on his back and side.  He uses a fullface mask.  Has difficulty with obtaining seal with high BiPAP pressures.  Also has facial hair.  Has tried several different masks.     Goes to bed around 1030, wakes at 5 AM.  Only waking once through the night on average, compared to 3-4 times with CPAP.  Does not take a nap typically in the day.    Denies use of sleeping aids.  Sleep latency several minutes.  Denies smoking.  1 cup of coffee a day.    Over the course of the last 1 to 2 years he has gained 60 to 80 pounds, attributes it to long-term steroid use when he had a back injury.    Motivated to continue BIPAP.    He has oxygen oxymetry on his watch-- on average between 93-95%, at times briefly drops to high 80's.    Over the last 1 to 2 years, he has gained 60 to 80 pounds, attributes it to long-term steroid use.    DME:[facility]    Sleep

## 2024-04-23 NOTE — PROGRESS NOTES
[facility] Pain Management        [address] [address]  Dept: [phone]        Follow up Note      Ramon K Rosales     Date of Visit:  24     CC:  Patient presents for follow up   Chief Complaint   Patient presents with    Follow-up     Right Transforaminal epidural steroid injection under fluoroscopic guidance at foraminal level L5.       HPI:    Pain is better.  Change in quality of symptoms:no.    Medication side effects:not applicable .   Recent diagnostic testing:none.   Recent interventional procedures:right L5 TFESI with 100% relief.    He has been on anticoagulation medications to include NSAIDS and has not been on herbal supplements.  He is not diabetic.     Imagin/2023 lumbar MRI w/o -      2023 lumbar xray -     FINDINGS:  Vertebral body heights appear maintained.  Scattered endplate and facet joint  degenerative changes with moderate disc space narrowing L3-L4 and L5-S1.     IMPRESSION:  Diffuse degenerative changes with moderate disc space narrowing L3-L4 and  L5-S1.       Potential Aberrant Drug-Related Behavior:      Urine Drug Screening:    OARRS report:    Past Medical History:   Diagnosis Date    Colitis, ulcerative chronic (HCC)     GERD (gastroesophageal reflux disease)     Hypertension     Low back pain     MISHA on CPAP     Thyroid disease        Past Surgical History:   Procedure Laterality Date    COLONOSCOPY      ENDOSCOPY, COLON, DIAGNOSTIC      HERNIA REPAIR N/A 2020    UMBILICAL HERNIA REPAIR WITH MESH, LAPAROSCOPIC, ROBOTIC XI ASSISTED performed by Stefano Laguerre MD at [facility] OR    PAIN MANAGEMENT PROCEDURE Left 11/3/2023    LEFT L5 AND S1 TRANSFORAMINAL EPIDURAL STEROID INJECTION performed by Basilia Hill DO at [facility] OR    PAIN MANAGEMENT PROCEDURE Bilateral 2023    BILATERAL L5 TRANSFORAMINAL EPIDURAL STEROID INJECTION performed by Basilia Hill DO at [facility] OR    PAIN MANAGEMENT PROCEDURE Right 2024    RIGHT

## 2024-04-24 ENCOUNTER — OFFICE VISIT (OUTPATIENT)
Dept: PAIN MANAGEMENT | Age: 54
End: 2024-04-24
Payer: COMMERCIAL

## 2024-04-24 VITALS
RESPIRATION RATE: 18 BRPM | BODY MASS INDEX: 39.17 KG/M2 | DIASTOLIC BLOOD PRESSURE: 63 MMHG | WEIGHT: 315 LBS | HEIGHT: 75 IN | HEART RATE: 56 BPM | TEMPERATURE: 97.4 F | OXYGEN SATURATION: 95 % | SYSTOLIC BLOOD PRESSURE: 100 MMHG

## 2024-04-24 DIAGNOSIS — G89.29 CHRONIC RIGHT-SIDED LOW BACK PAIN WITH RIGHT-SIDED SCIATICA: ICD-10-CM

## 2024-04-24 DIAGNOSIS — M54.41 CHRONIC RIGHT-SIDED LOW BACK PAIN WITH RIGHT-SIDED SCIATICA: ICD-10-CM

## 2024-04-24 DIAGNOSIS — G89.4 CHRONIC PAIN SYNDROME: Primary | ICD-10-CM

## 2024-04-24 DIAGNOSIS — M54.16 LUMBAR RADICULOPATHY: ICD-10-CM

## 2024-04-24 DIAGNOSIS — M48.062 SPINAL STENOSIS OF LUMBAR REGION WITH NEUROGENIC CLAUDICATION: ICD-10-CM

## 2024-04-24 DIAGNOSIS — M51.9 LUMBAR DISC DISORDER: ICD-10-CM

## 2024-04-24 PROCEDURE — 99213 OFFICE O/P EST LOW 20 MIN: CPT

## 2024-04-24 PROCEDURE — 99213 OFFICE O/P EST LOW 20 MIN: CPT | Performed by: PAIN MEDICINE

## 2024-04-24 PROCEDURE — 3078F DIAST BP <80 MM HG: CPT | Performed by: PAIN MEDICINE

## 2024-04-24 PROCEDURE — 3074F SYST BP LT 130 MM HG: CPT | Performed by: PAIN MEDICINE

## 2024-04-24 NOTE — PROGRESS NOTES
Ramon Rosales presents to the [facility] on 4/24/2024. Ramon is complaining of pain low back. The pain is intermittent. The pain is described as aching and sharp. Pain is rated on his best day at a 3, on his worst day at a 6, and on average at a 4 on the VAS scale. He took his last dose of Neurontin today.     Any procedures since your last visit: Yes, Right Transforaminal epidural steroid injection under fluoroscopic guidance at foraminal level L5 with 50 % relief.    Pacemaker or defibrillator: No.    He is  on NSAIDS and is not on anticoagulation medications to include none.     Medication Contract and Consent for Opioid Use Documents Filed        No documents found                    /63   Pulse 56   Temp 97.4 °F (36.3 °C) (Infrared)   Resp 18   Ht 1.905 m (6' 3\")   Wt (!) 163.2 kg (359 lb 12.7 oz)   SpO2 95%   BMI 44.97 kg/m²      No LMP for male patient.

## 2024-04-26 DIAGNOSIS — R60.9 EDEMA, UNSPECIFIED TYPE: ICD-10-CM

## 2024-04-26 DIAGNOSIS — M79.89 LEG SWELLING: ICD-10-CM

## 2024-04-26 DIAGNOSIS — I87.2 VENOUS INSUFFICIENCY (CHRONIC) (PERIPHERAL): ICD-10-CM

## 2024-04-30 RX ORDER — METOLAZONE 5 MG/1
5 TABLET ORAL DAILY
Qty: 30 TABLET | Refills: 3 | Status: SHIPPED | OUTPATIENT
Start: 2024-04-30

## 2024-04-30 RX ORDER — POTASSIUM CHLORIDE 20 MEQ/1
20 TABLET, EXTENDED RELEASE ORAL DAILY
Qty: 30 TABLET | Refills: 5 | Status: SHIPPED | OUTPATIENT
Start: 2024-04-30

## 2024-06-18 DIAGNOSIS — K51.90 ULCERATIVE COLITIS WITHOUT COMPLICATIONS, UNSPECIFIED LOCATION (HCC): ICD-10-CM

## 2024-06-18 DIAGNOSIS — E61.1 IRON DEFICIENCY: ICD-10-CM

## 2024-06-18 DIAGNOSIS — E67.8 HYPERVITAMINOSIS, B COMPLEX: Primary | ICD-10-CM

## 2024-06-18 DIAGNOSIS — E78.2 MIXED HYPERLIPIDEMIA: ICD-10-CM

## 2024-06-18 DIAGNOSIS — R73.01 IFG (IMPAIRED FASTING GLUCOSE): ICD-10-CM

## 2024-06-18 DIAGNOSIS — E03.9 ACQUIRED HYPOTHYROIDISM: ICD-10-CM

## 2024-06-18 DIAGNOSIS — G25.0 BENIGN ESSENTIAL TREMOR: ICD-10-CM

## 2024-06-27 ENCOUNTER — E-VISIT (OUTPATIENT)
Dept: FAMILY MEDICINE CLINIC | Age: 54
End: 2024-06-27
Payer: COMMERCIAL

## 2024-06-27 DIAGNOSIS — B96.89 ACUTE BACTERIAL SINUSITIS: Primary | ICD-10-CM

## 2024-06-27 DIAGNOSIS — J01.90 ACUTE BACTERIAL SINUSITIS: Primary | ICD-10-CM

## 2024-06-27 PROCEDURE — 99421 OL DIG E/M SVC 5-10 MIN: CPT | Performed by: NURSE PRACTITIONER

## 2024-06-27 RX ORDER — AZITHROMYCIN 250 MG/1
TABLET, FILM COATED ORAL
Qty: 1 PACKET | Refills: 0 | Status: SHIPPED | OUTPATIENT
Start: 2024-06-27 | End: 2024-07-07

## 2024-06-27 ASSESSMENT — LIFESTYLE VARIABLES: SMOKING_STATUS: NO, I'VE NEVER SMOKED

## 2024-07-05 DIAGNOSIS — G25.0 BENIGN ESSENTIAL TREMOR: ICD-10-CM

## 2024-07-05 DIAGNOSIS — E78.2 MIXED HYPERLIPIDEMIA: ICD-10-CM

## 2024-07-05 DIAGNOSIS — K51.90 ULCERATIVE COLITIS WITHOUT COMPLICATIONS, UNSPECIFIED LOCATION (HCC): ICD-10-CM

## 2024-07-05 DIAGNOSIS — E67.8 HYPERVITAMINOSIS, B COMPLEX: ICD-10-CM

## 2024-07-05 DIAGNOSIS — R73.01 IFG (IMPAIRED FASTING GLUCOSE): ICD-10-CM

## 2024-07-05 DIAGNOSIS — E61.1 IRON DEFICIENCY: ICD-10-CM

## 2024-07-05 DIAGNOSIS — E03.9 ACQUIRED HYPOTHYROIDISM: ICD-10-CM

## 2024-07-05 LAB
ALBUMIN: 4.4 G/DL (ref 3.5–5.2)
ALP BLD-CCNC: 97 U/L (ref 40–129)
ALT SERPL-CCNC: 38 U/L (ref 0–40)
ANION GAP SERPL CALCULATED.3IONS-SCNC: 18 MMOL/L (ref 7–16)
AST SERPL-CCNC: 35 U/L (ref 0–39)
BASOPHILS ABSOLUTE: 0.06 K/UL (ref 0–0.2)
BASOPHILS RELATIVE PERCENT: 1 % (ref 0–2)
BILIRUB SERPL-MCNC: 0.3 MG/DL (ref 0–1.2)
BUN BLDV-MCNC: 17 MG/DL (ref 6–20)
CALCIUM SERPL-MCNC: 10.3 MG/DL (ref 8.6–10.2)
CHLORIDE BLD-SCNC: 102 MMOL/L (ref 98–107)
CHOLESTEROL, TOTAL: 169 MG/DL
CO2: 22 MMOL/L (ref 22–29)
CREAT SERPL-MCNC: 1.1 MG/DL (ref 0.7–1.2)
EOSINOPHILS ABSOLUTE: 0.25 K/UL (ref 0.05–0.5)
EOSINOPHILS RELATIVE PERCENT: 3 % (ref 0–6)
FOLATE: 9.7 NG/ML (ref 4.8–24.2)
GFR, ESTIMATED: 79 ML/MIN/1.73M2
GLUCOSE BLD-MCNC: 74 MG/DL (ref 74–99)
HBA1C MFR BLD: 5.8 % (ref 4–5.6)
HCT VFR BLD CALC: 49.3 % (ref 37–54)
HDLC SERPL-MCNC: 41 MG/DL
HEMOGLOBIN: 15.5 G/DL (ref 12.5–16.5)
IMMATURE GRANULOCYTES %: 1 % (ref 0–5)
IMMATURE GRANULOCYTES ABSOLUTE: 0.08 K/UL (ref 0–0.58)
IRON % SATURATION: 13 % (ref 20–55)
IRON: 58 UG/DL (ref 59–158)
LDL CHOLESTEROL: 103 MG/DL
LYMPHOCYTES ABSOLUTE: 1.94 K/UL (ref 1.5–4)
LYMPHOCYTES RELATIVE PERCENT: 21 % (ref 20–42)
MCH RBC QN AUTO: 27.8 PG (ref 26–35)
MCHC RBC AUTO-ENTMCNC: 31.4 G/DL (ref 32–34.5)
MCV RBC AUTO: 88.4 FL (ref 80–99.9)
MONOCYTES ABSOLUTE: 0.85 K/UL (ref 0.1–0.95)
MONOCYTES RELATIVE PERCENT: 9 % (ref 2–12)
NEUTROPHILS ABSOLUTE: 6.01 K/UL (ref 1.8–7.3)
NEUTROPHILS RELATIVE PERCENT: 65 % (ref 43–80)
PDW BLD-RTO: 14.9 % (ref 11.5–15)
PLATELET # BLD: 380 K/UL (ref 130–450)
PMV BLD AUTO: 10.4 FL (ref 7–12)
POTASSIUM SERPL-SCNC: 4.2 MMOL/L (ref 3.5–5)
RBC # BLD: 5.58 M/UL (ref 3.8–5.8)
SED RATE, AUTOMATED: 41 MM/HR (ref 0–15)
SODIUM BLD-SCNC: 142 MMOL/L (ref 132–146)
T4 FREE: 1.4 NG/DL (ref 0.9–1.7)
TOTAL IRON BINDING CAPACITY: 437 UG/DL (ref 250–450)
TOTAL PROTEIN: 7.9 G/DL (ref 6.4–8.3)
TRIGL SERPL-MCNC: 125 MG/DL
TSH SERPL DL<=0.05 MIU/L-ACNC: 0.2 UIU/ML (ref 0.27–4.2)
VITAMIN B-12: 766 PG/ML (ref 211–946)
VLDLC SERPL CALC-MCNC: 25 MG/DL
WBC # BLD: 9.2 K/UL (ref 4.5–11.5)

## 2024-07-08 ENCOUNTER — OFFICE VISIT (OUTPATIENT)
Dept: FAMILY MEDICINE CLINIC | Age: 54
End: 2024-07-08
Payer: COMMERCIAL

## 2024-07-08 VITALS
SYSTOLIC BLOOD PRESSURE: 122 MMHG | RESPIRATION RATE: 18 BRPM | DIASTOLIC BLOOD PRESSURE: 84 MMHG | HEART RATE: 60 BPM | BODY MASS INDEX: 39.17 KG/M2 | OXYGEN SATURATION: 95 % | HEIGHT: 75 IN | WEIGHT: 315 LBS

## 2024-07-08 DIAGNOSIS — E03.9 ACQUIRED HYPOTHYROIDISM: ICD-10-CM

## 2024-07-08 DIAGNOSIS — B00.1 COLD SORE: ICD-10-CM

## 2024-07-08 DIAGNOSIS — R73.01 IFG (IMPAIRED FASTING GLUCOSE): ICD-10-CM

## 2024-07-08 DIAGNOSIS — K51.90 ULCERATIVE COLITIS WITHOUT COMPLICATIONS, UNSPECIFIED LOCATION (HCC): ICD-10-CM

## 2024-07-08 DIAGNOSIS — E78.2 MIXED HYPERLIPIDEMIA: ICD-10-CM

## 2024-07-08 DIAGNOSIS — I10 BENIGN ESSENTIAL HTN: ICD-10-CM

## 2024-07-08 DIAGNOSIS — M54.16 LUMBAR RADICULOPATHY: ICD-10-CM

## 2024-07-08 DIAGNOSIS — M51.9 LUMBAR DISC DISORDER: ICD-10-CM

## 2024-07-08 DIAGNOSIS — B00.9 HERPES SIMPLEX: ICD-10-CM

## 2024-07-08 DIAGNOSIS — J01.00 ACUTE NON-RECURRENT MAXILLARY SINUSITIS: Primary | ICD-10-CM

## 2024-07-08 DIAGNOSIS — E61.1 IRON DEFICIENCY: ICD-10-CM

## 2024-07-08 DIAGNOSIS — I87.2 VENOUS INSUFFICIENCY (CHRONIC) (PERIPHERAL): ICD-10-CM

## 2024-07-08 DIAGNOSIS — R60.9 EDEMA, UNSPECIFIED TYPE: ICD-10-CM

## 2024-07-08 DIAGNOSIS — M79.89 LEG SWELLING: ICD-10-CM

## 2024-07-08 DIAGNOSIS — R53.82 CHRONIC FATIGUE: ICD-10-CM

## 2024-07-08 PROCEDURE — 3074F SYST BP LT 130 MM HG: CPT | Performed by: FAMILY MEDICINE

## 2024-07-08 PROCEDURE — 3079F DIAST BP 80-89 MM HG: CPT | Performed by: FAMILY MEDICINE

## 2024-07-08 PROCEDURE — 99214 OFFICE O/P EST MOD 30 MIN: CPT | Performed by: FAMILY MEDICINE

## 2024-07-08 RX ORDER — BROMPHENIRAMINE MALEATE, PSEUDOEPHEDRINE HYDROCHLORIDE, AND DEXTROMETHORPHAN HYDROBROMIDE 2; 30; 10 MG/5ML; MG/5ML; MG/5ML
5 SYRUP ORAL 4 TIMES DAILY PRN
Qty: 180 ML | Refills: 0 | Status: SHIPPED | OUTPATIENT
Start: 2024-07-08

## 2024-07-08 RX ORDER — METOLAZONE 5 MG/1
5 TABLET ORAL DAILY
Qty: 90 TABLET | Refills: 3 | Status: SHIPPED | OUTPATIENT
Start: 2024-07-08

## 2024-07-08 RX ORDER — VALACYCLOVIR HYDROCHLORIDE 1 G/1
1000 TABLET, FILM COATED ORAL 2 TIMES DAILY
Qty: 10 TABLET | Refills: 5 | Status: SHIPPED | OUTPATIENT
Start: 2024-07-08 | End: 2024-07-13

## 2024-07-08 RX ORDER — MONTELUKAST SODIUM 10 MG/1
10 TABLET ORAL DAILY
Qty: 20 TABLET | Refills: 0 | Status: SHIPPED | OUTPATIENT
Start: 2024-07-08 | End: 2024-07-28

## 2024-07-08 RX ORDER — DEXAMETHASONE 4 MG/1
4 TABLET ORAL 2 TIMES DAILY WITH MEALS
Qty: 20 TABLET | Refills: 0 | Status: SHIPPED | OUTPATIENT
Start: 2024-07-08 | End: 2024-07-18

## 2024-07-08 RX ORDER — LEVOFLOXACIN 750 MG/1
750 TABLET, FILM COATED ORAL DAILY
Qty: 10 TABLET | Refills: 0 | Status: SHIPPED | OUTPATIENT
Start: 2024-07-08 | End: 2024-07-18

## 2024-07-08 RX ORDER — PREGABALIN 150 MG/1
150 CAPSULE ORAL 2 TIMES DAILY
COMMUNITY

## 2024-07-08 RX ORDER — METAXALONE 800 MG/1
800 TABLET ORAL 3 TIMES DAILY
Qty: 90 TABLET | Refills: 4 | Status: SHIPPED | OUTPATIENT
Start: 2024-07-08

## 2024-07-08 RX ORDER — POTASSIUM CHLORIDE 20 MEQ/1
20 TABLET, EXTENDED RELEASE ORAL DAILY
Qty: 30 TABLET | Refills: 5 | Status: SHIPPED | OUTPATIENT
Start: 2024-07-08

## 2024-07-08 RX ORDER — AZELASTINE 1 MG/ML
2 SPRAY, METERED NASAL 2 TIMES DAILY
Qty: 120 ML | Refills: 3 | Status: SHIPPED | OUTPATIENT
Start: 2024-07-08

## 2024-07-08 SDOH — ECONOMIC STABILITY: FOOD INSECURITY: WITHIN THE PAST 12 MONTHS, YOU WORRIED THAT YOUR FOOD WOULD RUN OUT BEFORE YOU GOT MONEY TO BUY MORE.: PATIENT DECLINED

## 2024-07-08 SDOH — ECONOMIC STABILITY: INCOME INSECURITY: HOW HARD IS IT FOR YOU TO PAY FOR THE VERY BASICS LIKE FOOD, HOUSING, MEDICAL CARE, AND HEATING?: PATIENT DECLINED

## 2024-07-08 SDOH — ECONOMIC STABILITY: FOOD INSECURITY: WITHIN THE PAST 12 MONTHS, THE FOOD YOU BOUGHT JUST DIDN'T LAST AND YOU DIDN'T HAVE MONEY TO GET MORE.: PATIENT DECLINED

## 2024-07-08 SDOH — ECONOMIC STABILITY: HOUSING INSECURITY
IN THE LAST 12 MONTHS, WAS THERE A TIME WHEN YOU DID NOT HAVE A STEADY PLACE TO SLEEP OR SLEPT IN A SHELTER (INCLUDING NOW)?: PATIENT DECLINED

## 2024-07-08 NOTE — PROGRESS NOTES
adequate control over MISHA and hypoxia with BIPAP/2 LO2.   -Based on sleep study results, review of device remote download, and discussion with patient, will continue auto-BiPAP therapy at settings of MAX IPAP 22, MIN EPAP 14, PS 4  cm of water with 2 L supplemental O2.  With continued weight loss, will follow-up in 5 months and consider overnight oximetry at that time.  Benefits from use of BiPAP and O2  - Settings are appropriate, with residual AHI 1.2 and maximum and average pressures within prescribed range.  - DME is [facility].  - Patient is using a full face mask --memory foam. Less leak with   -Previously discussed pathophysiology of MISHA and its impact on daily well-being, as well as cardiometabolic and neurocognitive health (particularly in moderate-severe cases).  -Patient understands that BiPAP should be worn every night for the duration of the night (in order to not miss therapy during early-morning REM period) for maximum benefit.     2.  Obesity. Body mass index is 42.52 kg/m².     -Weight down 40 pounds, continues to attempt weight loss.  -Discussed impact of weight gain on MISHA severity. Patient understands that MISHA severity may improve with weight loss but no guarantee of cure can be made.      Return in about 5 months (around 12/9/2024) for Follow up for sleep apnea.    RENETTA Ku-CNP  Sleep Medicine   [facility]  P -[phone]  F- [phone]

## 2024-07-09 ENCOUNTER — OFFICE VISIT (OUTPATIENT)
Dept: SLEEP CENTER | Age: 54
End: 2024-07-09
Payer: COMMERCIAL

## 2024-07-09 VITALS
DIASTOLIC BLOOD PRESSURE: 83 MMHG | SYSTOLIC BLOOD PRESSURE: 123 MMHG | WEIGHT: 315 LBS | HEART RATE: 71 BPM | BODY MASS INDEX: 39.17 KG/M2 | RESPIRATION RATE: 16 BRPM | HEIGHT: 75 IN | OXYGEN SATURATION: 97 %

## 2024-07-09 DIAGNOSIS — E66.9 OBESITY, UNSPECIFIED CLASSIFICATION, UNSPECIFIED OBESITY TYPE, UNSPECIFIED WHETHER SERIOUS COMORBIDITY PRESENT: ICD-10-CM

## 2024-07-09 DIAGNOSIS — G47.34 NOCTURNAL OXYGEN DESATURATION: ICD-10-CM

## 2024-07-09 DIAGNOSIS — G47.33 OBSTRUCTIVE SLEEP APNEA: Primary | ICD-10-CM

## 2024-07-09 PROCEDURE — 99214 OFFICE O/P EST MOD 30 MIN: CPT | Performed by: NURSE PRACTITIONER

## 2024-07-09 PROCEDURE — 3074F SYST BP LT 130 MM HG: CPT | Performed by: NURSE PRACTITIONER

## 2024-07-09 PROCEDURE — 3079F DIAST BP 80-89 MM HG: CPT | Performed by: NURSE PRACTITIONER

## 2024-07-09 ASSESSMENT — ENCOUNTER SYMPTOMS
SINUS PAIN: 1
PHOTOPHOBIA: 0
ABDOMINAL PAIN: 0
WHEEZING: 0
FACIAL SWELLING: 0
COUGH: 0
RHINORRHEA: 1
BLOOD IN STOOL: 0
EYE ITCHING: 0
SHORTNESS OF BREATH: 0
COLOR CHANGE: 0
NAUSEA: 0
SORE THROAT: 0
CONSTIPATION: 0
ANAL BLEEDING: 0
DIARRHEA: 1
VOICE CHANGE: 0
SINUS PRESSURE: 1
EYE REDNESS: 0
RECTAL PAIN: 0
APNEA: 0
TROUBLE SWALLOWING: 0
BACK PAIN: 0
EYE DISCHARGE: 0
CHEST TIGHTNESS: 0
ABDOMINAL DISTENTION: 0
VOMITING: 0
EYE PAIN: 0
STRIDOR: 0
CHOKING: 0

## 2024-07-09 ASSESSMENT — SLEEP AND FATIGUE QUESTIONNAIRES
HOW LIKELY ARE YOU TO NOD OFF OR FALL ASLEEP WHILE SITTING QUIETLY AFTER LUNCH WITHOUT ALCOHOL: WOULD NEVER DOZE
ESS TOTAL SCORE: 6
HOW LIKELY ARE YOU TO NOD OFF OR FALL ASLEEP WHEN YOU ARE A PASSENGER IN A CAR FOR AN HOUR WITHOUT A BREAK: WOULD NEVER DOZE
HOW LIKELY ARE YOU TO NOD OFF OR FALL ASLEEP WHILE SITTING AND TALKING TO SOMEONE: WOULD NEVER DOZE
HOW LIKELY ARE YOU TO NOD OFF OR FALL ASLEEP IN A CAR, WHILE STOPPED FOR A FEW MINUTES IN TRAFFIC: WOULD NEVER DOZE
HOW LIKELY ARE YOU TO NOD OFF OR FALL ASLEEP WHILE SITTING INACTIVE IN A PUBLIC PLACE: WOULD NEVER DOZE
HOW LIKELY ARE YOU TO NOD OFF OR FALL ASLEEP WHILE SITTING AND READING: MODERATE CHANCE OF DOZING
HOW LIKELY ARE YOU TO NOD OFF OR FALL ASLEEP WHILE WATCHING TV: SLIGHT CHANCE OF DOZING
HOW LIKELY ARE YOU TO NOD OFF OR FALL ASLEEP WHILE LYING DOWN TO REST IN THE AFTERNOON WHEN CIRCUMSTANCES PERMIT: HIGH CHANCE OF DOZING

## 2024-07-10 NOTE — PROGRESS NOTES
Findings: No erythema or rash.   Neurological:      Mental Status: He is alert and oriented to person, place, and time.      Cranial Nerves: No cranial nerve deficit.      Motor: No abnormal muscle tone.      Coordination: Coordination normal.      Deep Tendon Reflexes: Reflexes are normal and symmetric. Reflexes normal.   Psychiatric:         Behavior: Behavior normal.         Thought Content: Thought content normal.         Judgment: Judgment normal.         Assessment / Plan:   Ramon was seen today for 6 month follow-up.    Diagnoses and all orders for this visit:    Acute non-recurrent maxillary sinusitis  -     montelukast (SINGULAIR) 10 MG tablet; Take 1 tablet by mouth daily for 20 days  -     azelastine (ASTELIN) 0.1 % nasal spray; 2 sprays by Nasal route 2 times daily Use in each nostril as directed  -     levoFLOXacin (LEVAQUIN) 750 MG tablet; Take 1 tablet by mouth daily for 10 days  -     dexAMETHasone (DECADRON) 4 MG tablet; Take 1 tablet by mouth 2 times daily (with meals) for 10 days  -     brompheniramine-pseudoephedrine-DM 2-30-10 MG/5ML syrup; Take 5 mLs by mouth 4 times daily as needed for Congestion or Cough    Leg swelling  -     potassium chloride (KLOR-CON M) 20 MEQ extended release tablet; Take 1 tablet by mouth daily    Edema, unspecified type  -     potassium chloride (KLOR-CON M) 20 MEQ extended release tablet; Take 1 tablet by mouth daily    Venous insufficiency (chronic) (peripheral)  -     metOLazone (ZAROXOLYN) 5 MG tablet; Take 1 tablet by mouth daily    Herpes simplex    Cold sore  -     valACYclovir (VALTREX) 1 g tablet; Take 1 tablet by mouth 2 times daily for 5 days    Lumbar radiculopathy  -     metaxalone (SKELAXIN) 800 MG tablet; Take 1 tablet by mouth 3 times daily    Lumbar disc disorder  -     metaxalone (SKELAXIN) 800 MG tablet; Take 1 tablet by mouth 3 times daily    Acquired hypothyroidism    Benign essential HTN    Chronic fatigue  -     CBC with Auto Differential;

## 2024-07-15 DIAGNOSIS — I87.2 VENOUS INSUFFICIENCY (CHRONIC) (PERIPHERAL): ICD-10-CM

## 2024-07-15 RX ORDER — METOLAZONE 5 MG/1
TABLET ORAL
Refills: 0 | OUTPATIENT
Start: 2024-07-15

## 2024-11-25 RX ORDER — DEXAMETHASONE 4 MG/1
4 TABLET ORAL 2 TIMES DAILY WITH MEALS
Qty: 20 TABLET | Refills: 0 | Status: SHIPPED | OUTPATIENT
Start: 2024-11-25 | End: 2024-12-05

## 2024-12-04 DIAGNOSIS — M51.9 LUMBAR DISC DISORDER: ICD-10-CM

## 2024-12-04 DIAGNOSIS — M54.16 LUMBAR RADICULOPATHY: ICD-10-CM

## 2024-12-04 RX ORDER — METAXALONE 800 MG/1
800 TABLET ORAL 3 TIMES DAILY
Qty: 90 TABLET | Refills: 4 | Status: SHIPPED | OUTPATIENT
Start: 2024-12-04

## 2024-12-09 ENCOUNTER — OFFICE VISIT (OUTPATIENT)
Dept: SLEEP CENTER | Age: 54
End: 2024-12-09
Payer: COMMERCIAL

## 2024-12-09 VITALS
HEART RATE: 71 BPM | SYSTOLIC BLOOD PRESSURE: 132 MMHG | DIASTOLIC BLOOD PRESSURE: 88 MMHG | RESPIRATION RATE: 16 BRPM | OXYGEN SATURATION: 98 %

## 2024-12-09 DIAGNOSIS — E66.9 OBESITY, UNSPECIFIED CLASS, UNSPECIFIED OBESITY TYPE, UNSPECIFIED WHETHER SERIOUS COMORBIDITY PRESENT: ICD-10-CM

## 2024-12-09 DIAGNOSIS — G47.34 NOCTURNAL OXYGEN DESATURATION: ICD-10-CM

## 2024-12-09 DIAGNOSIS — G47.33 OBSTRUCTIVE SLEEP APNEA: Primary | ICD-10-CM

## 2024-12-09 PROCEDURE — 3075F SYST BP GE 130 - 139MM HG: CPT | Performed by: NURSE PRACTITIONER

## 2024-12-09 PROCEDURE — 3079F DIAST BP 80-89 MM HG: CPT | Performed by: NURSE PRACTITIONER

## 2024-12-09 PROCEDURE — 99214 OFFICE O/P EST MOD 30 MIN: CPT | Performed by: NURSE PRACTITIONER

## 2024-12-09 ASSESSMENT — SLEEP AND FATIGUE QUESTIONNAIRES
HOW LIKELY ARE YOU TO NOD OFF OR FALL ASLEEP WHILE SITTING AND READING: SLIGHT CHANCE OF DOZING
HOW LIKELY ARE YOU TO NOD OFF OR FALL ASLEEP WHILE LYING DOWN TO REST IN THE AFTERNOON WHEN CIRCUMSTANCES PERMIT: MODERATE CHANCE OF DOZING
HOW LIKELY ARE YOU TO NOD OFF OR FALL ASLEEP WHILE SITTING QUIETLY AFTER LUNCH WITHOUT ALCOHOL: WOULD NEVER DOZE
HOW LIKELY ARE YOU TO NOD OFF OR FALL ASLEEP WHEN YOU ARE A PASSENGER IN A CAR FOR AN HOUR WITHOUT A BREAK: SLIGHT CHANCE OF DOZING
HOW LIKELY ARE YOU TO NOD OFF OR FALL ASLEEP WHILE WATCHING TV: SLIGHT CHANCE OF DOZING
HOW LIKELY ARE YOU TO NOD OFF OR FALL ASLEEP IN A CAR, WHILE STOPPED FOR A FEW MINUTES IN TRAFFIC: WOULD NEVER DOZE
ESS TOTAL SCORE: 5
HOW LIKELY ARE YOU TO NOD OFF OR FALL ASLEEP WHILE SITTING INACTIVE IN A PUBLIC PLACE: WOULD NEVER DOZE
HOW LIKELY ARE YOU TO NOD OFF OR FALL ASLEEP WHILE SITTING AND TALKING TO SOMEONE: WOULD NEVER DOZE

## 2024-12-09 NOTE — PROGRESS NOTES
TAKE 1 TABLET BY MOUTH THREE TIMES A DAY, Disp: 90 tablet, Rfl: 4    pregabalin (LYRICA) 150 MG capsule, Take 1 capsule by mouth 2 times daily., Disp: , Rfl:     potassium chloride (KLOR-CON M) 20 MEQ extended release tablet, Take 1 tablet by mouth daily, Disp: 30 tablet, Rfl: 5    metOLazone (ZAROXOLYN) 5 MG tablet, Take 1 tablet by mouth daily, Disp: 90 tablet, Rfl: 3    azelastine (ASTELIN) 0.1 % nasal spray, 2 sprays by Nasal route 2 times daily Use in each nostril as directed, Disp: 120 mL, Rfl: 3    brompheniramine-pseudoephedrine-DM 2-30-10 MG/5ML syrup, Take 5 mLs by mouth 4 times daily as needed for Congestion or Cough, Disp: 180 mL, Rfl: 0    topiramate (TOPAMAX) 25 MG tablet, take 3 tablets by mouth at bedtime, Disp: , Rfl:     levothyroxine (SYNTHROID) 200 MCG tablet, Take 1 tablet by mouth Daily, Disp: 90 tablet, Rfl: 3    propranolol (INDERAL LA) 80 MG extended release capsule, Take 1 capsule by mouth daily, Disp: 90 capsule, Rfl: 3    busPIRone (BUSPAR) 7.5 MG tablet, Take 1 tablet by mouth 2 times daily, Disp: 180 tablet, Rfl: 5    traZODone (DESYREL) 50 MG tablet, TAKE 2 TABLETS BY MOUTH AT NIGHT, Disp: 60 tablet, Rfl: 5    omeprazole (PRILOSEC) 40 MG delayed release capsule, Take 1 capsule by mouth daily, Disp: 90 capsule, Rfl: 3    ibuprofen (ADVIL;MOTRIN) 800 MG tablet, TAKE 1 TABLET BY MOUTH 3 TIMES  DAILY WITH MEALS, Disp: 270 tablet, Rfl: 3    tadalafil (CIALIS) 5 MG tablet, Take 1 tablet by mouth daily, Disp: 30 tablet, Rfl: 4    hyoscyamine (LEVSIN) 125 MCG tablet, Take 1 tablet by mouth every 4 hours as needed (IBS symptoms), Disp: 120 tablet, Rfl: 3    montelukast (SINGULAIR) 10 MG tablet, Take 1 tablet by mouth daily for 20 days, Disp: 20 tablet, Rfl: 0        12/9/2024     7:34 AM 7/9/2024     7:04 AM 4/16/2024     7:07 AM   Sleep Medicine   Sitting and reading 1 2 1   Watching TV 1 1 2   Sitting, inactive in a public place (e.g. a theatre or a meeting) 0 0 2   As a passenger in a car

## 2024-12-27 DIAGNOSIS — E61.1 IRON DEFICIENCY: ICD-10-CM

## 2024-12-27 DIAGNOSIS — E78.2 MIXED HYPERLIPIDEMIA: ICD-10-CM

## 2024-12-27 DIAGNOSIS — K51.90 ULCERATIVE COLITIS WITHOUT COMPLICATIONS, UNSPECIFIED LOCATION (HCC): ICD-10-CM

## 2024-12-27 DIAGNOSIS — R53.82 CHRONIC FATIGUE: ICD-10-CM

## 2024-12-27 DIAGNOSIS — R73.01 IFG (IMPAIRED FASTING GLUCOSE): ICD-10-CM

## 2024-12-27 LAB
ALBUMIN: 3.8 G/DL (ref 3.5–5.2)
ALP BLD-CCNC: 99 U/L (ref 40–129)
ALT SERPL-CCNC: 20 U/L (ref 0–40)
ANION GAP SERPL CALCULATED.3IONS-SCNC: 11 MMOL/L (ref 7–16)
AST SERPL-CCNC: 19 U/L (ref 0–39)
BASOPHILS ABSOLUTE: 0.06 K/UL (ref 0–0.2)
BASOPHILS RELATIVE PERCENT: 1 % (ref 0–2)
BILIRUB SERPL-MCNC: 0.2 MG/DL (ref 0–1.2)
BUN BLDV-MCNC: 16 MG/DL (ref 6–20)
CALCIUM SERPL-MCNC: 9.7 MG/DL (ref 8.6–10.2)
CHLORIDE BLD-SCNC: 112 MMOL/L (ref 98–107)
CHOLESTEROL, TOTAL: 180 MG/DL
CO2: 23 MMOL/L (ref 22–29)
CREAT SERPL-MCNC: 1.2 MG/DL (ref 0.7–1.2)
EOSINOPHILS ABSOLUTE: 0.29 K/UL (ref 0.05–0.5)
EOSINOPHILS RELATIVE PERCENT: 3 % (ref 0–6)
GFR, ESTIMATED: 72 ML/MIN/1.73M2
GLUCOSE BLD-MCNC: 93 MG/DL (ref 74–99)
HBA1C MFR BLD: 5.1 % (ref 4–5.6)
HCT VFR BLD CALC: 44.4 % (ref 37–54)
HDLC SERPL-MCNC: 37 MG/DL
HEMOGLOBIN: 14.1 G/DL (ref 12.5–16.5)
IMMATURE GRANULOCYTES %: 1 % (ref 0–5)
IMMATURE GRANULOCYTES ABSOLUTE: 0.06 K/UL (ref 0–0.58)
IRON % SATURATION: 13 % (ref 20–55)
IRON: 42 UG/DL (ref 59–158)
LDL CHOLESTEROL: 111 MG/DL
LYMPHOCYTES ABSOLUTE: 1.51 K/UL (ref 1.5–4)
LYMPHOCYTES RELATIVE PERCENT: 15 % (ref 20–42)
MCH RBC QN AUTO: 28.6 PG (ref 26–35)
MCHC RBC AUTO-ENTMCNC: 31.8 G/DL (ref 32–34.5)
MCV RBC AUTO: 90.1 FL (ref 80–99.9)
MONOCYTES ABSOLUTE: 0.87 K/UL (ref 0.1–0.95)
MONOCYTES RELATIVE PERCENT: 9 % (ref 2–12)
NEUTROPHILS ABSOLUTE: 7.03 K/UL (ref 1.8–7.3)
NEUTROPHILS RELATIVE PERCENT: 72 % (ref 43–80)
PDW BLD-RTO: 15.1 % (ref 11.5–15)
PLATELET # BLD: 263 K/UL (ref 130–450)
PMV BLD AUTO: 10.6 FL (ref 7–12)
POTASSIUM SERPL-SCNC: 5.3 MMOL/L (ref 3.5–5)
RBC # BLD: 4.93 M/UL (ref 3.8–5.8)
SED RATE, AUTOMATED: 24 MM/HR (ref 0–15)
SODIUM BLD-SCNC: 146 MMOL/L (ref 132–146)
T4 FREE: 0.9 NG/DL (ref 0.9–1.7)
TOTAL IRON BINDING CAPACITY: 329 UG/DL (ref 250–450)
TOTAL PROTEIN: 6.5 G/DL (ref 6.4–8.3)
TRIGL SERPL-MCNC: 161 MG/DL
TSH SERPL DL<=0.05 MIU/L-ACNC: 0.84 UIU/ML (ref 0.27–4.2)
VLDLC SERPL CALC-MCNC: 32 MG/DL
WBC # BLD: 9.8 K/UL (ref 4.5–11.5)

## 2025-01-05 DIAGNOSIS — M79.89 LEG SWELLING: ICD-10-CM

## 2025-01-05 DIAGNOSIS — R60.9 EDEMA, UNSPECIFIED TYPE: ICD-10-CM

## 2025-01-05 ASSESSMENT — PATIENT HEALTH QUESTIONNAIRE - PHQ9
2. FEELING DOWN, DEPRESSED OR HOPELESS: NOT AT ALL
1. LITTLE INTEREST OR PLEASURE IN DOING THINGS: NOT AT ALL
SUM OF ALL RESPONSES TO PHQ QUESTIONS 1-9: 0
SUM OF ALL RESPONSES TO PHQ QUESTIONS 1-9: 0
SUM OF ALL RESPONSES TO PHQ9 QUESTIONS 1 & 2: 0
SUM OF ALL RESPONSES TO PHQ9 QUESTIONS 1 & 2: 0
SUM OF ALL RESPONSES TO PHQ QUESTIONS 1-9: 0
SUM OF ALL RESPONSES TO PHQ QUESTIONS 1-9: 0
1. LITTLE INTEREST OR PLEASURE IN DOING THINGS: NOT AT ALL
2. FEELING DOWN, DEPRESSED OR HOPELESS: NOT AT ALL

## 2025-01-06 RX ORDER — POTASSIUM CHLORIDE 1500 MG/1
20 TABLET, EXTENDED RELEASE ORAL DAILY
Qty: 30 TABLET | Refills: 5 | OUTPATIENT
Start: 2025-01-06

## 2025-01-07 DIAGNOSIS — I10 BENIGN ESSENTIAL HTN: ICD-10-CM

## 2025-01-07 DIAGNOSIS — E03.9 ACQUIRED HYPOTHYROIDISM: ICD-10-CM

## 2025-01-07 DIAGNOSIS — G25.0 BENIGN ESSENTIAL TREMOR: ICD-10-CM

## 2025-01-07 DIAGNOSIS — K21.9 GASTROESOPHAGEAL REFLUX DISEASE WITHOUT ESOPHAGITIS: ICD-10-CM

## 2025-01-07 RX ORDER — PROPRANOLOL HYDROCHLORIDE 80 MG/1
80 CAPSULE, EXTENDED RELEASE ORAL DAILY
Qty: 90 CAPSULE | Refills: 3 | OUTPATIENT
Start: 2025-01-07

## 2025-01-07 RX ORDER — OMEPRAZOLE 40 MG/1
40 CAPSULE, DELAYED RELEASE ORAL DAILY
Qty: 90 CAPSULE | Refills: 3 | OUTPATIENT
Start: 2025-01-07

## 2025-01-07 RX ORDER — LEVOTHYROXINE SODIUM 200 UG/1
200 TABLET ORAL DAILY
Qty: 90 TABLET | Refills: 3 | OUTPATIENT
Start: 2025-01-07

## 2025-01-07 RX ORDER — IBUPROFEN 800 MG/1
TABLET, FILM COATED ORAL
Qty: 270 TABLET | Refills: 3 | OUTPATIENT
Start: 2025-01-07

## 2025-01-08 ENCOUNTER — OFFICE VISIT (OUTPATIENT)
Dept: FAMILY MEDICINE CLINIC | Age: 55
End: 2025-01-08
Payer: COMMERCIAL

## 2025-01-08 VITALS
HEART RATE: 76 BPM | RESPIRATION RATE: 18 BRPM | WEIGHT: 301 LBS | SYSTOLIC BLOOD PRESSURE: 130 MMHG | DIASTOLIC BLOOD PRESSURE: 78 MMHG | OXYGEN SATURATION: 96 % | HEIGHT: 75 IN | BODY MASS INDEX: 37.42 KG/M2

## 2025-01-08 DIAGNOSIS — F51.01 PRIMARY INSOMNIA: ICD-10-CM

## 2025-01-08 DIAGNOSIS — E61.1 IRON DEFICIENCY: ICD-10-CM

## 2025-01-08 DIAGNOSIS — I10 BENIGN ESSENTIAL HTN: ICD-10-CM

## 2025-01-08 DIAGNOSIS — R73.01 IFG (IMPAIRED FASTING GLUCOSE): ICD-10-CM

## 2025-01-08 DIAGNOSIS — M54.16 LUMBAR RADICULOPATHY: ICD-10-CM

## 2025-01-08 DIAGNOSIS — K21.9 GASTROESOPHAGEAL REFLUX DISEASE WITHOUT ESOPHAGITIS: ICD-10-CM

## 2025-01-08 DIAGNOSIS — E03.9 ACQUIRED HYPOTHYROIDISM: ICD-10-CM

## 2025-01-08 DIAGNOSIS — J01.00 ACUTE NON-RECURRENT MAXILLARY SINUSITIS: ICD-10-CM

## 2025-01-08 DIAGNOSIS — E78.2 MIXED HYPERLIPIDEMIA: ICD-10-CM

## 2025-01-08 DIAGNOSIS — F41.9 ANXIETY: ICD-10-CM

## 2025-01-08 DIAGNOSIS — K51.90 ULCERATIVE COLITIS WITHOUT COMPLICATIONS, UNSPECIFIED LOCATION (HCC): Primary | ICD-10-CM

## 2025-01-08 DIAGNOSIS — G25.0 BENIGN ESSENTIAL TREMOR: ICD-10-CM

## 2025-01-08 PROCEDURE — 3075F SYST BP GE 130 - 139MM HG: CPT | Performed by: FAMILY MEDICINE

## 2025-01-08 PROCEDURE — 3078F DIAST BP <80 MM HG: CPT | Performed by: FAMILY MEDICINE

## 2025-01-08 PROCEDURE — 99214 OFFICE O/P EST MOD 30 MIN: CPT | Performed by: FAMILY MEDICINE

## 2025-01-08 RX ORDER — BUSPIRONE HYDROCHLORIDE 7.5 MG/1
7.5 TABLET ORAL 2 TIMES DAILY
Qty: 180 TABLET | Refills: 5 | Status: SHIPPED | OUTPATIENT
Start: 2025-01-08

## 2025-01-08 RX ORDER — LEVOTHYROXINE SODIUM 200 UG/1
200 TABLET ORAL DAILY
Qty: 90 TABLET | Refills: 3 | Status: SHIPPED | OUTPATIENT
Start: 2025-01-08

## 2025-01-08 RX ORDER — BROMPHENIRAMINE MALEATE, PSEUDOEPHEDRINE HYDROCHLORIDE, AND DEXTROMETHORPHAN HYDROBROMIDE 2; 30; 10 MG/5ML; MG/5ML; MG/5ML
5 SYRUP ORAL 4 TIMES DAILY PRN
Qty: 180 ML | Refills: 0 | Status: SHIPPED | OUTPATIENT
Start: 2025-01-08

## 2025-01-08 RX ORDER — DEXAMETHASONE 4 MG/1
4 TABLET ORAL 2 TIMES DAILY WITH MEALS
Qty: 20 TABLET | Refills: 2 | Status: SHIPPED
Start: 2025-01-08 | End: 2025-01-08

## 2025-01-08 RX ORDER — AZITHROMYCIN 250 MG/1
TABLET, FILM COATED ORAL
Qty: 6 TABLET | Refills: 0 | Status: SHIPPED | OUTPATIENT
Start: 2025-01-08 | End: 2025-01-18

## 2025-01-08 RX ORDER — IBUPROFEN 800 MG/1
TABLET, FILM COATED ORAL
Qty: 270 TABLET | Refills: 3 | Status: SHIPPED | OUTPATIENT
Start: 2025-01-08

## 2025-01-08 RX ORDER — TRAZODONE HYDROCHLORIDE 50 MG/1
TABLET, FILM COATED ORAL
Qty: 60 TABLET | Refills: 5 | Status: SHIPPED | OUTPATIENT
Start: 2025-01-08

## 2025-01-08 RX ORDER — OMEPRAZOLE 40 MG/1
40 CAPSULE, DELAYED RELEASE ORAL DAILY
Qty: 90 CAPSULE | Refills: 3 | Status: SHIPPED | OUTPATIENT
Start: 2025-01-08

## 2025-01-08 RX ORDER — PROPRANOLOL HYDROCHLORIDE 80 MG/1
80 CAPSULE, EXTENDED RELEASE ORAL DAILY
Qty: 90 CAPSULE | Refills: 3 | Status: SHIPPED | OUTPATIENT
Start: 2025-01-08

## 2025-01-08 RX ORDER — DEXAMETHASONE 4 MG/1
4 TABLET ORAL 2 TIMES DAILY WITH MEALS
Qty: 20 TABLET | Refills: 0 | Status: SHIPPED | OUTPATIENT
Start: 2025-01-08 | End: 2025-01-18

## 2025-01-08 SDOH — ECONOMIC STABILITY: FOOD INSECURITY: WITHIN THE PAST 12 MONTHS, THE FOOD YOU BOUGHT JUST DIDN'T LAST AND YOU DIDN'T HAVE MONEY TO GET MORE.: PATIENT DECLINED

## 2025-01-08 SDOH — ECONOMIC STABILITY: FOOD INSECURITY: WITHIN THE PAST 12 MONTHS, YOU WORRIED THAT YOUR FOOD WOULD RUN OUT BEFORE YOU GOT MONEY TO BUY MORE.: PATIENT DECLINED

## 2025-01-08 NOTE — PROGRESS NOTES
visit:    Ulcerative colitis without complications, unspecified location (HCC)  -     CBC with Auto Differential; Future  -     TSH; Future  -     T4, Free; Future  -     Ferritin; Future  -     Iron and TIBC; Future  -     Comprehensive Metabolic Panel; Future    Anxiety  -     busPIRone (BUSPAR) 7.5 MG tablet; Take 1 tablet by mouth 2 times daily  -     CBC with Auto Differential; Future  -     TSH; Future  -     T4, Free; Future  -     Ferritin; Future  -     Iron and TIBC; Future  -     Comprehensive Metabolic Panel; Future    Acquired hypothyroidism  -     levothyroxine (SYNTHROID) 200 MCG tablet; Take 1 tablet by mouth Daily  -     CBC with Auto Differential; Future  -     TSH; Future  -     T4, Free; Future  -     Ferritin; Future  -     Iron and TIBC; Future  -     Comprehensive Metabolic Panel; Future    Gastroesophageal reflux disease without esophagitis  -     omeprazole (PRILOSEC) 40 MG delayed release capsule; Take 1 capsule by mouth daily  -     CBC with Auto Differential; Future  -     TSH; Future  -     T4, Free; Future  -     Ferritin; Future  -     Iron and TIBC; Future  -     Comprehensive Metabolic Panel; Future    Benign essential HTN  -     propranolol (INDERAL LA) 80 MG extended release capsule; Take 1 capsule by mouth daily  -     CBC with Auto Differential; Future  -     TSH; Future  -     T4, Free; Future  -     Ferritin; Future  -     Iron and TIBC; Future  -     Comprehensive Metabolic Panel; Future    Benign essential tremor  -     propranolol (INDERAL LA) 80 MG extended release capsule; Take 1 capsule by mouth daily  -     CBC with Auto Differential; Future  -     TSH; Future  -     T4, Free; Future  -     Ferritin; Future  -     Iron and TIBC; Future  -     Comprehensive Metabolic Panel; Future    Primary insomnia  -     traZODone (DESYREL) 50 MG tablet; TAKE 2 TABLETS BY MOUTH AT NIGHT  -     CBC with Auto Differential; Future  -     TSH; Future  -     T4, Free; Future  -

## 2025-01-10 DIAGNOSIS — I87.2 VENOUS INSUFFICIENCY (CHRONIC) (PERIPHERAL): ICD-10-CM

## 2025-01-13 DIAGNOSIS — J01.00 ACUTE NON-RECURRENT MAXILLARY SINUSITIS: ICD-10-CM

## 2025-01-13 RX ORDER — DEXAMETHASONE 4 MG/1
4 TABLET ORAL 2 TIMES DAILY WITH MEALS
Qty: 20 TABLET | Refills: 0 | Status: SHIPPED | OUTPATIENT
Start: 2025-01-13 | End: 2025-01-23

## 2025-01-13 RX ORDER — METOLAZONE 5 MG/1
TABLET ORAL
Refills: 0 | OUTPATIENT
Start: 2025-01-13

## 2025-01-16 NOTE — PROGRESS NOTES
Daily Note     Today's date: 2025  Patient name: Elis Patterson  : 1986  MRN: [id number]  Referring provider: Brett Everett MD  Dx:   Encounter Diagnosis     ICD-10-CM    1. Pelvic pain  R10.2           Start Time: 1500  Stop Time: 1600  Total time in clinic (min): 60 minutes    Subjective: The patient reports that she had pelvic pain for about half an hour yesterday. Today she was really busy at work and she was only able to empty her bladder once. She notes that she had some discomfort from holding her bladder, but she felt that she was able to fully empty.       Objective: See treatment diary below      Assessment: Tolerated treatment well. Patient  initiated therapeutic treatment plan today. Worked on some pelvic floor muscle downtraining today. She did have some trouble with relaxing fully on the table as lying on her back, even with her knees up was uncomfortable. Sidelying is the most comfortable positions. Focused on breathing for PFM relaxation and letting go of tension. She does hold visible tension in her feet. She does note some discomfort on both her right and left pelvic floor muscles today and some mild tension noted as well R >L. Weakness also noted with cues to contract and compensation with glutes at times. She was able to isolate her muscles properly with cueing. HEP given today for home to focus on PFM relaxation as well as some light PFM engagement as well.       Plan: Continue per plan of care.            Precautions: anxiety/depression; hysterectomy 2024; asthma  [employer] HEP: [id number]   Treatment Goals:         IE RE   [place]-18     PFDI-20     V-Q 39.39    PGQ         Date                                                   Manuals             Scar mobilization             STM mobilization             Abdominal Tri Planar Myofascial release             Pelvic Floor Muscle Releases  10 min                                     Patient Education             Neuro  I have reviewed the questionnaire, PMH, Medications and allergies. Diagnoses and all orders for this visit:    Cellulitis of lower extremity, unspecified laterality  -     doxycycline monohydrate (MONODOX) 100 MG capsule; Take 1 capsule by mouth 2 times daily for 10 days        Continue with supportive care and follow up with PCP if no improvement with treatment. 11-20 minutes were spent on the digital evaluation and management of this patient. Re-Ed             Real Time Ultrasound             TA ADIM             PFMC slow holds             Diaphragmatic Breathing  10 min           DKC with Diaphragmatic Breathing  15 sec x 3 ea             Child's Pose with DiaphragmaticBreathing  15 sec x 3 ea           Body Scan for PFM release/relaxatoin  8 min           Pelvic Floor Drops in deep squat             PFM contractions  10x supine/hooklying                                                               Ther Ex             PPT             LTR             Cat Cow             UL Butterfly Stretch             Butterfly stretch             Pball pelvic tilts             Pball pelvic circles             Pball hip add stretch                                                                 Ther Activity             EDUCATION/COUNSELING 20 min 10 min           Dilator Training             Gait Training                                       Modalities

## 2025-01-22 ENCOUNTER — E-VISIT (OUTPATIENT)
Dept: FAMILY MEDICINE CLINIC | Age: 55
End: 2025-01-22

## 2025-01-22 DIAGNOSIS — J01.00 ACUTE NON-RECURRENT MAXILLARY SINUSITIS: Primary | ICD-10-CM

## 2025-01-22 RX ORDER — BROMPHENIRAMINE MALEATE, PSEUDOEPHEDRINE HYDROCHLORIDE, AND DEXTROMETHORPHAN HYDROBROMIDE 2; 30; 10 MG/5ML; MG/5ML; MG/5ML
5 SYRUP ORAL 4 TIMES DAILY PRN
Qty: 180 ML | Refills: 0 | Status: SHIPPED | OUTPATIENT
Start: 2025-01-22

## 2025-01-22 RX ORDER — AZITHROMYCIN 250 MG/1
TABLET, FILM COATED ORAL
Qty: 6 TABLET | Refills: 0 | Status: SHIPPED | OUTPATIENT
Start: 2025-01-22 | End: 2025-02-01

## 2025-01-22 ASSESSMENT — LIFESTYLE VARIABLES: SMOKING_STATUS: NO, I'VE NEVER SMOKED

## 2025-01-30 ENCOUNTER — OFFICE VISIT (OUTPATIENT)
Dept: FAMILY MEDICINE CLINIC | Age: 55
End: 2025-01-30
Payer: COMMERCIAL

## 2025-01-30 VITALS
SYSTOLIC BLOOD PRESSURE: 118 MMHG | HEART RATE: 84 BPM | WEIGHT: 292 LBS | DIASTOLIC BLOOD PRESSURE: 80 MMHG | BODY MASS INDEX: 36.31 KG/M2 | HEIGHT: 75 IN | RESPIRATION RATE: 18 BRPM | OXYGEN SATURATION: 97 %

## 2025-01-30 DIAGNOSIS — E03.9 ACQUIRED HYPOTHYROIDISM: ICD-10-CM

## 2025-01-30 DIAGNOSIS — L03.116 CELLULITIS OF LEFT LOWER EXTREMITY: Primary | ICD-10-CM

## 2025-01-30 DIAGNOSIS — G25.0 ESSENTIAL TREMOR: ICD-10-CM

## 2025-01-30 PROCEDURE — 3079F DIAST BP 80-89 MM HG: CPT | Performed by: FAMILY MEDICINE

## 2025-01-30 PROCEDURE — 99213 OFFICE O/P EST LOW 20 MIN: CPT | Performed by: FAMILY MEDICINE

## 2025-01-30 PROCEDURE — 3074F SYST BP LT 130 MM HG: CPT | Performed by: FAMILY MEDICINE

## 2025-01-30 RX ORDER — MUPIROCIN 20 MG/G
OINTMENT TOPICAL
Qty: 30 G | Refills: 0 | Status: SHIPPED | OUTPATIENT
Start: 2025-01-30

## 2025-01-30 RX ORDER — SULFAMETHOXAZOLE AND TRIMETHOPRIM 800; 160 MG/1; MG/1
1 TABLET ORAL 2 TIMES DAILY
Qty: 10 TABLET | Refills: 0 | Status: SHIPPED | OUTPATIENT
Start: 2025-01-30 | End: 2025-02-04

## 2025-01-30 RX ORDER — TOPIRAMATE 25 MG/1
100 TABLET, FILM COATED ORAL DAILY
Qty: 90 TABLET | Refills: 3
Start: 2025-01-30

## 2025-01-30 RX ORDER — DOXYCYCLINE HYCLATE 100 MG
100 TABLET ORAL 2 TIMES DAILY WITH MEALS
Qty: 20 TABLET | Refills: 0 | Status: SHIPPED | OUTPATIENT
Start: 2025-01-30 | End: 2025-02-09

## 2025-01-30 RX ORDER — PROPRANOLOL HYDROCHLORIDE 120 MG/1
120 CAPSULE, EXTENDED RELEASE ORAL NIGHTLY
Qty: 90 CAPSULE | Refills: 3 | Status: SHIPPED | OUTPATIENT
Start: 2025-01-30

## 2025-01-31 ASSESSMENT — ENCOUNTER SYMPTOMS
RECTAL PAIN: 0
NAUSEA: 0
APNEA: 0
ABDOMINAL PAIN: 0
SHORTNESS OF BREATH: 0
COLOR CHANGE: 0
EYE REDNESS: 0
COUGH: 0
VOMITING: 0
RHINORRHEA: 0
SINUS PRESSURE: 0
DIARRHEA: 0
VOICE CHANGE: 0
CHEST TIGHTNESS: 0
EYE ITCHING: 0
PHOTOPHOBIA: 0
CHOKING: 0
WHEEZING: 0
STRIDOR: 0
FACIAL SWELLING: 0
TROUBLE SWALLOWING: 0
EYE PAIN: 0
BLOOD IN STOOL: 0
CONSTIPATION: 0
EYE DISCHARGE: 0
SORE THROAT: 0
BACK PAIN: 0
ANAL BLEEDING: 0
ABDOMINAL DISTENTION: 0

## 2025-01-31 NOTE — PROGRESS NOTES
Ramon Rosales is a 54 y.o. male  .  Subjective:      Laceration to leg.  Happened last week and has gotten more red and is draining more.  Mild cellulitis.  Will give him a topical and an oral antibiotic if it does not completely go away if worsens anytime he needs to be checked again I have a low show threshold for going to the ER.        Review of Systems   Constitutional:  Positive for fatigue. Negative for activity change, appetite change, chills, diaphoresis, fever and unexpected weight change.   HENT:  Negative for congestion, dental problem, drooling, ear discharge, ear pain, facial swelling, hearing loss, mouth sores, nosebleeds, postnasal drip, rhinorrhea, sinus pressure, sneezing, sore throat, tinnitus, trouble swallowing and voice change.    Eyes:  Negative for photophobia, pain, discharge, redness, itching and visual disturbance.   Respiratory:  Negative for apnea, cough, choking, chest tightness, shortness of breath, wheezing and stridor.    Cardiovascular:  Negative for chest pain, palpitations and leg swelling.   Gastrointestinal:  Negative for abdominal distention, abdominal pain, anal bleeding, blood in stool, constipation, diarrhea, nausea, rectal pain and vomiting.   Endocrine: Negative for cold intolerance, heat intolerance, polydipsia, polyphagia and polyuria.   Genitourinary:  Negative for decreased urine volume, difficulty urinating, dysuria, enuresis, flank pain, frequency, genital sores, hematuria, penile discharge, penile pain, penile swelling, scrotal swelling, testicular pain and urgency.   Musculoskeletal:  Negative for arthralgias, back pain, gait problem, joint swelling, myalgias, neck pain and neck stiffness.   Skin:  Positive for wound. Negative for color change, pallor and rash.   Allergic/Immunologic: Negative for environmental allergies, food allergies and immunocompromised state.   Neurological:  Negative for dizziness, tremors, seizures, syncope, facial asymmetry, speech

## 2025-02-04 RX ORDER — CLINDAMYCIN PHOSPHATE 10 UG/ML
LOTION TOPICAL
Qty: 60 G | Refills: 2 | Status: SHIPPED | OUTPATIENT
Start: 2025-02-04 | End: 2025-03-06

## 2025-02-07 DIAGNOSIS — L03.116 CELLULITIS OF LEFT LOWER EXTREMITY: ICD-10-CM

## 2025-02-07 RX ORDER — DOXYCYCLINE HYCLATE 100 MG
100 TABLET ORAL 2 TIMES DAILY WITH MEALS
Qty: 20 TABLET | Refills: 0 | Status: SHIPPED | OUTPATIENT
Start: 2025-02-07 | End: 2025-02-17

## 2025-02-07 RX ORDER — SULFAMETHOXAZOLE AND TRIMETHOPRIM 800; 160 MG/1; MG/1
1 TABLET ORAL 2 TIMES DAILY
Qty: 20 TABLET | Refills: 0 | Status: SHIPPED | OUTPATIENT
Start: 2025-02-07 | End: 2025-02-17

## 2025-02-20 DIAGNOSIS — L03.90 CELLULITIS, UNSPECIFIED CELLULITIS SITE: Primary | ICD-10-CM

## 2025-02-20 RX ORDER — DOXYCYCLINE HYCLATE 100 MG
100 TABLET ORAL 2 TIMES DAILY WITH MEALS
Qty: 20 TABLET | Refills: 0 | Status: SHIPPED | OUTPATIENT
Start: 2025-02-20 | End: 2025-03-02

## 2025-02-20 RX ORDER — SULFAMETHOXAZOLE AND TRIMETHOPRIM 800; 160 MG/1; MG/1
1 TABLET ORAL 2 TIMES DAILY
Qty: 20 TABLET | Refills: 0 | Status: SHIPPED | OUTPATIENT
Start: 2025-02-20 | End: 2025-03-02

## 2025-03-03 RX ORDER — DOXYCYCLINE HYCLATE 100 MG
100 TABLET ORAL 2 TIMES DAILY WITH MEALS
Qty: 14 TABLET | Refills: 0 | Status: SHIPPED | OUTPATIENT
Start: 2025-03-03 | End: 2025-03-10

## 2025-03-12 DIAGNOSIS — I83.029 VENOUS ULCER OF LEFT LEG (HCC): ICD-10-CM

## 2025-03-12 DIAGNOSIS — L97.929 VENOUS ULCER OF LEFT LEG (HCC): ICD-10-CM

## 2025-03-12 DIAGNOSIS — I87.2 VENOUS INSUFFICIENCY: Primary | Chronic | ICD-10-CM

## 2025-03-12 RX ORDER — DOXYCYCLINE HYCLATE 100 MG
100 TABLET ORAL 2 TIMES DAILY WITH MEALS
Qty: 40 TABLET | Refills: 0 | Status: SHIPPED | OUTPATIENT
Start: 2025-03-12 | End: 2025-04-01

## 2025-03-12 RX ORDER — SULFAMETHOXAZOLE AND TRIMETHOPRIM 800; 160 MG/1; MG/1
1 TABLET ORAL 2 TIMES DAILY
Qty: 20 TABLET | Refills: 0 | Status: SHIPPED | OUTPATIENT
Start: 2025-03-12 | End: 2025-03-22

## 2025-04-14 ENCOUNTER — OFFICE VISIT (OUTPATIENT)
Dept: FAMILY MEDICINE CLINIC | Age: 55
End: 2025-04-14
Payer: COMMERCIAL

## 2025-04-14 VITALS
OXYGEN SATURATION: 98 % | WEIGHT: 291 LBS | HEART RATE: 84 BPM | HEIGHT: 75 IN | SYSTOLIC BLOOD PRESSURE: 118 MMHG | DIASTOLIC BLOOD PRESSURE: 80 MMHG | RESPIRATION RATE: 18 BRPM | BODY MASS INDEX: 36.18 KG/M2

## 2025-04-14 DIAGNOSIS — R19.00 ABDOMINAL WALL MASS OF LEFT FLANK: Primary | ICD-10-CM

## 2025-04-14 DIAGNOSIS — R73.01 IFG (IMPAIRED FASTING GLUCOSE): ICD-10-CM

## 2025-04-14 DIAGNOSIS — L20.82 FLEXURAL ECZEMA: ICD-10-CM

## 2025-04-14 DIAGNOSIS — E03.9 ACQUIRED HYPOTHYROIDISM: ICD-10-CM

## 2025-04-14 DIAGNOSIS — R53.82 CHRONIC FATIGUE: ICD-10-CM

## 2025-04-14 DIAGNOSIS — E61.1 IRON DEFICIENCY: ICD-10-CM

## 2025-04-14 PROCEDURE — 99214 OFFICE O/P EST MOD 30 MIN: CPT | Performed by: FAMILY MEDICINE

## 2025-04-14 PROCEDURE — 3074F SYST BP LT 130 MM HG: CPT | Performed by: FAMILY MEDICINE

## 2025-04-14 PROCEDURE — 3079F DIAST BP 80-89 MM HG: CPT | Performed by: FAMILY MEDICINE

## 2025-04-14 RX ORDER — CLOBETASOL PROPIONATE 0.5 MG/ML
LOTION TOPICAL
Qty: 118 ML | Refills: 3 | Status: SHIPPED | OUTPATIENT
Start: 2025-04-14

## 2025-04-14 ASSESSMENT — ENCOUNTER SYMPTOMS
APNEA: 0
SORE THROAT: 0
ABDOMINAL PAIN: 0
ABDOMINAL DISTENTION: 0
EYE DISCHARGE: 0
VOICE CHANGE: 0
EYE ITCHING: 0
CHEST TIGHTNESS: 0
RHINORRHEA: 0
COUGH: 0
EYE REDNESS: 0
CONSTIPATION: 0
PHOTOPHOBIA: 0
CHOKING: 0
VOMITING: 0
RECTAL PAIN: 0
NAUSEA: 0
WHEEZING: 0
SINUS PRESSURE: 0
STRIDOR: 0
FACIAL SWELLING: 0
ANAL BLEEDING: 0
SHORTNESS OF BREATH: 0
EYE PAIN: 0
TROUBLE SWALLOWING: 0
COLOR CHANGE: 0
BLOOD IN STOOL: 0
BACK PAIN: 0
DIARRHEA: 0

## 2025-04-14 NOTE — PROGRESS NOTES
Ramon Rosales is a 55 y.o. male  .  Subjective:      Legs dry . Wound healed. Painful marble sized mass to right flank. Due for blood work.       Review of Systems   Constitutional:  Positive for fatigue. Negative for activity change, appetite change, chills, diaphoresis, fever and unexpected weight change.   HENT:  Negative for congestion, dental problem, drooling, ear discharge, ear pain, facial swelling, hearing loss, mouth sores, nosebleeds, postnasal drip, rhinorrhea, sinus pressure, sneezing, sore throat, tinnitus, trouble swallowing and voice change.    Eyes:  Negative for photophobia, pain, discharge, redness, itching and visual disturbance.   Respiratory:  Negative for apnea, cough, choking, chest tightness, shortness of breath, wheezing and stridor.    Cardiovascular:  Negative for chest pain, palpitations and leg swelling.   Gastrointestinal:  Negative for abdominal distention, abdominal pain, anal bleeding, blood in stool, constipation, diarrhea, nausea, rectal pain and vomiting.   Endocrine: Negative for cold intolerance, heat intolerance, polydipsia, polyphagia and polyuria.   Genitourinary:  Negative for decreased urine volume, difficulty urinating, dysuria, enuresis, flank pain, frequency, genital sores, hematuria, penile discharge, penile pain, penile swelling, scrotal swelling, testicular pain and urgency.   Musculoskeletal:  Negative for arthralgias, back pain, gait problem, joint swelling, myalgias, neck pain and neck stiffness.   Skin:  Negative for color change, pallor, rash and wound.   Allergic/Immunologic: Negative for environmental allergies, food allergies and immunocompromised state.   Neurological:  Negative for dizziness, tremors, seizures, syncope, facial asymmetry, speech difficulty, weakness, light-headedness, numbness and headaches.   Hematological:  Negative for adenopathy. Does not bruise/bleed easily.   Psychiatric/Behavioral:  Negative for agitation, behavioral problems,

## 2025-04-15 ENCOUNTER — RESULTS FOLLOW-UP (OUTPATIENT)
Dept: FAMILY MEDICINE CLINIC | Age: 55
End: 2025-04-15

## 2025-04-15 RX ORDER — DICLOFENAC SODIUM 75 MG/1
75 TABLET, DELAYED RELEASE ORAL 2 TIMES DAILY
Qty: 60 TABLET | Refills: 0 | Status: SHIPPED | OUTPATIENT
Start: 2025-04-15

## 2025-04-21 ENCOUNTER — PATIENT MESSAGE (OUTPATIENT)
Dept: FAMILY MEDICINE CLINIC | Age: 55
End: 2025-04-21

## 2025-04-21 RX ORDER — VALACYCLOVIR HYDROCHLORIDE 500 MG/1
500 TABLET, FILM COATED ORAL DAILY
Qty: 90 TABLET | Refills: 5 | Status: SHIPPED | OUTPATIENT
Start: 2025-04-21

## 2025-05-07 NOTE — PROGRESS NOTES
light-headedness, numbness and headaches. Hematological: Negative for adenopathy. Does not bruise/bleed easily. Psychiatric/Behavioral: Negative for agitation, behavioral problems, confusion, decreased concentration, dysphoric mood, hallucinations, self-injury, sleep disturbance and suicidal ideas. The patient is not nervous/anxious and is not hyperactive. Past Medical History:   Diagnosis Date    Colitis, ulcerative chronic (HCC)     GERD (gastroesophageal reflux disease)     Hypertension     Thyroid disease        Social History     Socioeconomic History    Marital status:      Spouse name: Not on file    Number of children: Not on file    Years of education: Not on file    Highest education level: Not on file   Occupational History    Not on file   Tobacco Use    Smoking status: Never Smoker    Smokeless tobacco: Never Used   Vaping Use    Vaping Use: Never used   Substance and Sexual Activity    Alcohol use: No     Comment: occasional     Drug use: No    Sexual activity: Yes     Partners: Female   Other Topics Concern    Not on file   Social History Narrative    Not on file     Social Determinants of Health     Financial Resource Strain: Low Risk     Difficulty of Paying Living Expenses: Not hard at all   Food Insecurity: No Food Insecurity    Worried About Running Out of Food in the Last Year: Never true    [address] in the Last Year: Never true   Transportation Needs:     Lack of Transportation (Medical): Not on file    Lack of Transportation (Non-Medical):  Not on file   Physical Activity:     Days of Exercise per Week: Not on file    Minutes of Exercise per Session: Not on file   Stress:     Feeling of Stress : Not on file   Social Connections:     Frequency of Communication with Friends and Family: Not on file    Frequency of Social Gatherings with Friends and Family: Not on file    Attends Zoroastrianism Services: Not on file   [employer]: Not on file    Attends Club or Organization Meetings: Not on file    Marital Status: Not on file   Intimate Partner Violence:     Fear of Current or Ex-Partner: Not on file    Emotionally Abused: Not on file    Physically Abused: Not on file    Sexually Abused: Not on file   Housing Stability:     Unable to Pay for Housing in the Last Year: Not on file    Number of Places Lived in the Last Year: Not on file    Unstable Housing in the Last Year: Not on file       Family History   Problem Relation Age of Onset    Heart Disease Mother     Heart Disease Father     Arthritis Sister        Current Outpatient Medications on File Prior to Visit   Medication Sig Dispense Refill    levothyroxine (SYNTHROID) 200 MCG tablet TAKE 1 TABLET BY MOUTH  DAILY 90 tablet 3    hydroCHLOROthiazide (HYDRODIURIL) 25 MG tablet TAKE 1 TABLET BY MOUTH  DAILY 90 tablet 3    traZODone (DESYREL) 50 MG tablet TAKE 2 TABLETS BY MOUTH AT  NIGHT 60 tablet 11    valACYclovir (VALTREX) 1 g tablet TAKE TWO TABLETS BY MOUTH  TWICE A DAY FOR ONE DAY 12 tablet 3    gabapentin (NEURONTIN) 600 MG tablet Take 1 tablet by mouth 3 times daily. 270 tablet 3    propranolol (INDERAL LA) 80 MG extended release capsule Take 1 capsule by mouth daily 90 capsule 5    omeprazole (PRILOSEC) 40 MG delayed release capsule Take 1 capsule by mouth every morning (before breakfast) 90 capsule 1    busPIRone (BUSPAR) 7.5 MG tablet take 1 tablet by mouth twice a day 60 tablet 5     No current facility-administered medications on file prior to visit. Allergies   Allergen Reactions    Penicillins        I have reviewed his allergies, medications, problem list, medical,social and family history and have updated as needed in the electronic medical record. Objective:     Physical Exam  Vitals and nursing note reviewed. Constitutional:       General: He is not in acute distress. Appearance: He is well-developed.  He is not diaphoretic. HENT:      Head: Normocephalic and atraumatic. Right Ear: External ear normal.      Left Ear: External ear normal.      Nose: Nose normal.      Mouth/Throat:      Pharynx: No oropharyngeal exudate. Eyes:      General: No scleral icterus. Right eye: No discharge. Left eye: No discharge. Conjunctiva/sclera: Conjunctivae normal.      Pupils: Pupils are equal, round, and reactive to light. Neck:      Thyroid: No thyromegaly. Vascular: No JVD. Trachea: No tracheal deviation. Cardiovascular:      Rate and Rhythm: Normal rate and regular rhythm. Heart sounds: Normal heart sounds. No murmur heard. No friction rub. No gallop. Pulmonary:      Effort: Pulmonary effort is normal. No respiratory distress. Breath sounds: Normal breath sounds. No stridor. No wheezing or rales. Chest:      Chest wall: No tenderness. Abdominal:      General: Bowel sounds are normal. There is no distension. Palpations: Abdomen is soft. There is no mass. Tenderness: There is no abdominal tenderness. There is no guarding or rebound. Genitourinary:     Comments: Deferred by patient  Musculoskeletal:         General: No tenderness. Normal range of motion. Cervical back: Normal range of motion and neck supple. Lymphadenopathy:      Cervical: No cervical adenopathy. Skin:     General: Skin is warm and dry. Coloration: Skin is not pale. Findings: No erythema or rash. Neurological:      Mental Status: He is alert and oriented to person, place, and time. Cranial Nerves: No cranial nerve deficit. Motor: No abnormal muscle tone. Coordination: Coordination normal.      Deep Tendon Reflexes: Reflexes are normal and symmetric. Reflexes normal.   Psychiatric:         Behavior: Behavior normal.         Thought Content:  Thought content normal.         Judgment: Judgment normal.         Assessment / Plan:   Jessica Reid was seen today for discuss labs.    Diagnoses and all orders for this visit:    Benign essential HTN    Mixed hyperlipidemia  -     Lipid Panel; Future    IFG (impaired fasting glucose)  -     Hemoglobin A1C; Future    Chronic fatigue  -     CBC Auto Differential; Future  -     Comprehensive Metabolic Panel; Future  -     TSH without Reflex; Future  -     T4, Free; Future    Ulcerative colitis without complications, unspecified location ([facility]    Acquired hypothyroidism  -     TSH without Reflex; Future  -     T4, Free; Future        Reviewed healthmaintenance report. Patient is aware of deficiencies and suggested preventative tests. 06:33

## 2025-05-12 DIAGNOSIS — M54.16 LUMBAR RADICULOPATHY: ICD-10-CM

## 2025-05-12 DIAGNOSIS — M51.9 LUMBAR DISC DISORDER: ICD-10-CM

## 2025-05-12 NOTE — TELEPHONE ENCOUNTER
Name of Medication(s) Requested:  Requested Prescriptions     Pending Prescriptions Disp Refills    metaxalone (SKELAXIN) 800 MG tablet [Pharmacy Med Name: METAXALONE 800 MG TABLET] 90 tablet 4     Sig: TAKE 1 TABLET BY MOUTH THREE TIMES A DAY       Medication is on current medication list Yes    Dosage and directions were verified? Yes    Quantity verified: 30 day supply     Pharmacy Verified?  Yes    Last Appointment:  4/14/2025    Future appts:  Future Appointments   Date Time Provider Department Center   7/8/2025  3:15 PM Home Narvaez DO [facility] [facility]   7/15/2025  7:20 AM Myah Morales APRN - CNP [facility] SLE[facility]        (If no appt send self scheduling link. .REFILLAPPT)  Scheduling request sent?     [] Yes  [x] No    Does patient need updated?  [] Yes  [x] No

## 2025-05-13 DIAGNOSIS — R19.00 ABDOMINAL WALL MASS OF LEFT FLANK: ICD-10-CM

## 2025-05-13 RX ORDER — DICLOFENAC SODIUM 75 MG/1
75 TABLET, DELAYED RELEASE ORAL 2 TIMES DAILY
Qty: 60 TABLET | Refills: 0 | Status: SHIPPED | OUTPATIENT
Start: 2025-05-13

## 2025-05-13 NOTE — TELEPHONE ENCOUNTER
Name of Medication(s) Requested:  Requested Prescriptions     Pending Prescriptions Disp Refills    diclofenac (VOLTAREN) 75 MG EC tablet [Pharmacy Med Name: DICLOFENAC SOD EC 75 MG TAB] 60 tablet 0     Sig: TAKE 1 TABLET BY MOUTH TWICE A DAY       Medication is on current medication list Yes    Dosage and directions were verified? Yes    Quantity verified: 30 day supply     Pharmacy Verified?  Yes    Last Appointment:  4/14/2025    Future appts:  Future Appointments   Date Time Provider Department Center   7/8/2025  3:15 PM Home Narvaez DO [place] [facility]   7/15/2025  7:20 AM Myah Morales, RENETTA - CNP [facility] SLEEP [facility]        (If no appt send self scheduling link. .REFILLAPPT)  Scheduling request sent?     [] Yes  [x] No    Does patient need updated?  [] Yes  [x] No

## 2025-05-14 RX ORDER — METAXALONE 800 MG/1
800 TABLET ORAL 3 TIMES DAILY
Qty: 90 TABLET | Refills: 4 | Status: SHIPPED | OUTPATIENT
Start: 2025-05-14

## 2025-05-23 DIAGNOSIS — K51.90 ULCERATIVE COLITIS WITHOUT COMPLICATIONS, UNSPECIFIED LOCATION (HCC): Primary | ICD-10-CM

## 2025-05-28 RX ORDER — CLINDAMYCIN PHOSPHATE 10 UG/ML
LOTION TOPICAL 2 TIMES DAILY
Qty: 60 ML | Refills: 2 | Status: SHIPPED | OUTPATIENT
Start: 2025-05-28

## 2025-06-06 DIAGNOSIS — I87.2 VENOUS INSUFFICIENCY (CHRONIC) (PERIPHERAL): ICD-10-CM

## 2025-06-06 NOTE — TELEPHONE ENCOUNTER
Name of Medication(s) Requested:  Requested Prescriptions     Pending Prescriptions Disp Refills    metOLazone (ZAROXOLYN) 5 MG tablet [Pharmacy Med Name: METOLAZONE 5 MG TABLET] 90 tablet 3     Sig: TAKE 1 TABLET BY MOUTH EVERY DAY       Medication is on current medication list Yes    Dosage and directions were verified? Yes    Quantity verified: 90 day supply     Pharmacy Verified?  Yes    Last Appointment:  4/14/2025    Future appts:  Future Appointments   Date Time Provider Department Center   7/8/2025  3:15 PM Home Narvaez DO [place] [facility]   7/15/2025  7:20 AM Myah Morales, RENETTA - CNP [facility]        (If no appt send self scheduling link. .REFILLAPPT)  Scheduling request sent?     [] Yes  [x] No    Does patient need updated?  [] Yes  [x] No

## 2025-06-07 RX ORDER — METOLAZONE 5 MG/1
5 TABLET ORAL DAILY
Qty: 90 TABLET | Refills: 3 | Status: SHIPPED | OUTPATIENT
Start: 2025-06-07

## 2025-06-27 DIAGNOSIS — R19.00 ABDOMINAL WALL MASS OF LEFT FLANK: ICD-10-CM

## 2025-06-27 RX ORDER — DICLOFENAC SODIUM 75 MG/1
75 TABLET, DELAYED RELEASE ORAL 2 TIMES DAILY
Qty: 60 TABLET | Refills: 0 | Status: SHIPPED | OUTPATIENT
Start: 2025-06-27

## 2025-06-27 NOTE — TELEPHONE ENCOUNTER
Name of Medication(s) Requested:  Requested Prescriptions     Pending Prescriptions Disp Refills    diclofenac (VOLTAREN) 75 MG EC tablet [Pharmacy Med Name: DICLOFENAC SOD EC 75 MG TAB] 60 tablet 0     Sig: TAKE 1 TABLET BY MOUTH TWICE A DAY       Medication is on current medication list Yes    Dosage and directions were verified? Yes    Quantity verified: 30 day supply     Pharmacy Verified?  Yes    Last Appointment:  4/14/2025    Future appts:  Future Appointments   Date Time Provider Department Center   7/8/2025  3:15 PM Home Narvaez DO [place] [facility] DEP   7/15/2025  7:20 AM Myah Morales, RENETTA - CNP [facility] SLEEP [facility]        (If no appt send self scheduling link. .REFILLAPPT)  Scheduling request sent?     [] Yes  [x] No    Does patient need updated?  [] Yes  [x] No

## 2025-07-07 DIAGNOSIS — R19.00 ABDOMINAL WALL MASS OF LEFT FLANK: ICD-10-CM

## 2025-07-07 DIAGNOSIS — L20.82 FLEXURAL ECZEMA: ICD-10-CM

## 2025-07-07 DIAGNOSIS — R53.82 CHRONIC FATIGUE: ICD-10-CM

## 2025-07-07 DIAGNOSIS — K51.90 ULCERATIVE COLITIS WITHOUT COMPLICATIONS, UNSPECIFIED LOCATION (HCC): ICD-10-CM

## 2025-07-07 DIAGNOSIS — R73.01 IFG (IMPAIRED FASTING GLUCOSE): ICD-10-CM

## 2025-07-07 DIAGNOSIS — E03.9 ACQUIRED HYPOTHYROIDISM: ICD-10-CM

## 2025-07-07 DIAGNOSIS — E61.1 IRON DEFICIENCY: ICD-10-CM

## 2025-07-07 LAB
ALBUMIN: 4 G/DL (ref 3.5–5.2)
ALP BLD-CCNC: 87 U/L (ref 40–129)
ALT SERPL-CCNC: 20 U/L (ref 0–50)
ANION GAP SERPL CALCULATED.3IONS-SCNC: 10 MMOL/L (ref 7–16)
AST SERPL-CCNC: 27 U/L (ref 0–50)
BASOPHILS ABSOLUTE: 0.06 K/UL (ref 0–0.2)
BASOPHILS RELATIVE PERCENT: 1 % (ref 0–2)
BILIRUB SERPL-MCNC: 0.5 MG/DL (ref 0–1.2)
BUN BLDV-MCNC: 15 MG/DL (ref 6–20)
C-REACTIVE PROTEIN: <3 MG/L (ref 0–5)
CALCIUM SERPL-MCNC: 9.6 MG/DL (ref 8.6–10)
CHLORIDE BLD-SCNC: 105 MMOL/L (ref 98–107)
CO2: 27 MMOL/L (ref 22–29)
CREAT SERPL-MCNC: 1.2 MG/DL (ref 0.7–1.2)
EOSINOPHILS ABSOLUTE: 0.19 K/UL (ref 0.05–0.5)
EOSINOPHILS RELATIVE PERCENT: 3 % (ref 0–6)
FERRITIN: 28 NG/ML
GFR, ESTIMATED: 76 ML/MIN/1.73M2
GLUCOSE BLD-MCNC: 90 MG/DL (ref 74–99)
HBA1C MFR BLD: 5.1 % (ref 4–5.6)
HCT VFR BLD CALC: 42.1 % (ref 37–54)
HEMOGLOBIN: 14 G/DL (ref 12.5–16.5)
IMMATURE GRANULOCYTES %: 0 % (ref 0–5)
IMMATURE GRANULOCYTES ABSOLUTE: <0.03 K/UL (ref 0–0.58)
IRON % SATURATION: 25 % (ref 20–55)
IRON: 91 UG/DL (ref 61–157)
LYMPHOCYTES ABSOLUTE: 1.54 K/UL (ref 1.5–4)
LYMPHOCYTES RELATIVE PERCENT: 24 % (ref 20–42)
MCH RBC QN AUTO: 29.4 PG (ref 26–35)
MCHC RBC AUTO-ENTMCNC: 33.3 G/DL (ref 32–34.5)
MCV RBC AUTO: 88.3 FL (ref 80–99.9)
MONOCYTES ABSOLUTE: 0.72 K/UL (ref 0.1–0.95)
MONOCYTES RELATIVE PERCENT: 11 % (ref 2–12)
NEUTROPHILS ABSOLUTE: 3.84 K/UL (ref 1.8–7.3)
NEUTROPHILS RELATIVE PERCENT: 60 % (ref 43–80)
PDW BLD-RTO: 14.6 % (ref 11.5–15)
PLATELET # BLD: 257 K/UL (ref 130–450)
PMV BLD AUTO: 10.8 FL (ref 7–12)
POTASSIUM SERPL-SCNC: 5.4 MMOL/L (ref 3.5–5.1)
RBC # BLD: 4.77 M/UL (ref 3.8–5.8)
SED RATE, AUTOMATED: 4 MM/HR (ref 0–15)
SODIUM BLD-SCNC: 141 MMOL/L (ref 136–145)
T3 FREE: 2.98 PG/ML (ref 2–4.4)
T4 FREE: 1.1 NG/DL (ref 0.9–1.7)
TOTAL IRON BINDING CAPACITY: 367 UG/DL (ref 250–450)
TOTAL PROTEIN: 6.5 G/DL (ref 6.4–8.3)
TSH SERPL DL<=0.05 MIU/L-ACNC: 1.92 UIU/ML (ref 0.27–4.2)
WBC # BLD: 6.4 K/UL (ref 4.5–11.5)

## 2025-07-08 ENCOUNTER — OFFICE VISIT (OUTPATIENT)
Dept: FAMILY MEDICINE CLINIC | Age: 55
End: 2025-07-08
Payer: COMMERCIAL

## 2025-07-08 VITALS
HEART RATE: 57 BPM | BODY MASS INDEX: 35.09 KG/M2 | DIASTOLIC BLOOD PRESSURE: 72 MMHG | RESPIRATION RATE: 18 BRPM | HEIGHT: 75 IN | TEMPERATURE: 97.1 F | WEIGHT: 282.19 LBS | OXYGEN SATURATION: 98 % | SYSTOLIC BLOOD PRESSURE: 128 MMHG

## 2025-07-08 DIAGNOSIS — H69.91 EUSTACHIAN TUBE DISORDER, RIGHT: ICD-10-CM

## 2025-07-08 DIAGNOSIS — K51.90 ULCERATIVE COLITIS WITHOUT COMPLICATIONS, UNSPECIFIED LOCATION (HCC): Primary | ICD-10-CM

## 2025-07-08 DIAGNOSIS — Z12.5 SCREENING FOR MALIGNANT NEOPLASM OF PROSTATE: ICD-10-CM

## 2025-07-08 DIAGNOSIS — E78.2 MIXED HYPERLIPIDEMIA: ICD-10-CM

## 2025-07-08 DIAGNOSIS — R73.01 IFG (IMPAIRED FASTING GLUCOSE): ICD-10-CM

## 2025-07-08 DIAGNOSIS — E61.1 IRON DEFICIENCY: ICD-10-CM

## 2025-07-08 DIAGNOSIS — M25.561 CHRONIC PAIN OF RIGHT KNEE: ICD-10-CM

## 2025-07-08 DIAGNOSIS — E34.9 HYPOTESTOSTERONISM: ICD-10-CM

## 2025-07-08 DIAGNOSIS — G89.29 CHRONIC PAIN OF RIGHT KNEE: ICD-10-CM

## 2025-07-08 PROCEDURE — 3074F SYST BP LT 130 MM HG: CPT | Performed by: FAMILY MEDICINE

## 2025-07-08 PROCEDURE — 3078F DIAST BP <80 MM HG: CPT | Performed by: FAMILY MEDICINE

## 2025-07-08 PROCEDURE — 99214 OFFICE O/P EST MOD 30 MIN: CPT | Performed by: FAMILY MEDICINE

## 2025-07-08 RX ORDER — IPRATROPIUM BROMIDE 42 UG/1
2 SPRAY, METERED NASAL 4 TIMES DAILY
Qty: 15 ML | Refills: 3 | Status: SHIPPED | OUTPATIENT
Start: 2025-07-08

## 2025-07-08 RX ORDER — LACTULOSE 10 G/15ML
10 SOLUTION ORAL EVERY EVENING
Qty: 237 ML | Refills: 3 | Status: SHIPPED | OUTPATIENT
Start: 2025-07-08

## 2025-07-08 NOTE — PROGRESS NOTES
Ramon Rosales is a 55 y.o. male  .  Subjective:      HPI.  History of Present Illness  The patient presents for constipation, eustachian tube disorder, knee pain, and health maintenance.    He has been experiencing intermittent constipation for several months. Bowel movements are infrequent, occurring every 2 to 3 days, followed by a day of loose stools. He also reports feeling bloated. Stool softeners provide some relief. He has not noticed any blood in his stool for the past few months.    He continues to experience ear popping, a sensation that compels him to pop his ears. This provides temporary relief, but the need to pop his ears returns within a minute. He also reports significant sinus drainage and a persistent runny nose with clear discharge. He suspects these symptoms may be related to his CPAP use. He does not currently use a nasal spray. He has been taking Allegra, suspecting allergies as the cause, but this has not alleviated his symptoms.    He has been experiencing knee pain for the past few months, particularly when sleeping on his side. The pain is severe enough to wake him up at night, but it subsides after he gets up and walks around. He recently purchased a new mattress and wonders if it could be too soft. He does not report any cracking or popping sounds from the knee. He took Tylenol this morning to manage the pain.    He is interested in having his PSA and testosterone levels checked.       Review of Systems   Constitutional:  Positive for fatigue. Negative for activity change, appetite change, chills, diaphoresis, fever and unexpected weight change.   HENT:  Negative for congestion, dental problem, drooling, ear discharge, ear pain, facial swelling, hearing loss, mouth sores, nosebleeds, postnasal drip, rhinorrhea, sinus pressure, sneezing, sore throat, tinnitus, trouble swallowing and voice change.    Eyes:  Negative for photophobia, pain, discharge, redness, itching and visual disturbance.

## 2025-07-13 DIAGNOSIS — E87.5 HYPERKALEMIA: Primary | ICD-10-CM

## 2025-07-15 ENCOUNTER — OFFICE VISIT (OUTPATIENT)
Dept: SLEEP CENTER | Age: 55
End: 2025-07-15
Payer: COMMERCIAL

## 2025-07-15 VITALS
HEIGHT: 76 IN | RESPIRATION RATE: 16 BRPM | SYSTOLIC BLOOD PRESSURE: 104 MMHG | DIASTOLIC BLOOD PRESSURE: 68 MMHG | WEIGHT: 285.06 LBS | OXYGEN SATURATION: 97 % | TEMPERATURE: 98.2 F | BODY MASS INDEX: 34.71 KG/M2 | HEART RATE: 65 BPM

## 2025-07-15 DIAGNOSIS — G47.33 OBSTRUCTIVE SLEEP APNEA: Primary | ICD-10-CM

## 2025-07-15 PROCEDURE — 3074F SYST BP LT 130 MM HG: CPT | Performed by: NURSE PRACTITIONER

## 2025-07-15 PROCEDURE — 3078F DIAST BP <80 MM HG: CPT | Performed by: NURSE PRACTITIONER

## 2025-07-15 PROCEDURE — 99214 OFFICE O/P EST MOD 30 MIN: CPT | Performed by: NURSE PRACTITIONER

## 2025-07-15 ASSESSMENT — SLEEP AND FATIGUE QUESTIONNAIRES
HOW LIKELY ARE YOU TO NOD OFF OR FALL ASLEEP IN A CAR, WHILE STOPPED FOR A FEW MINUTES IN TRAFFIC: WOULD NEVER DOZE
HOW LIKELY ARE YOU TO NOD OFF OR FALL ASLEEP WHILE SITTING QUIETLY AFTER LUNCH WITHOUT ALCOHOL: SLIGHT CHANCE OF DOZING
HOW LIKELY ARE YOU TO NOD OFF OR FALL ASLEEP WHILE SITTING INACTIVE IN A PUBLIC PLACE: SLIGHT CHANCE OF DOZING
HOW LIKELY ARE YOU TO NOD OFF OR FALL ASLEEP WHILE WATCHING TV: SLIGHT CHANCE OF DOZING
HOW LIKELY ARE YOU TO NOD OFF OR FALL ASLEEP WHILE SITTING AND READING: MODERATE CHANCE OF DOZING
ESS TOTAL SCORE: 9
HOW LIKELY ARE YOU TO NOD OFF OR FALL ASLEEP WHEN YOU ARE A PASSENGER IN A CAR FOR AN HOUR WITHOUT A BREAK: SLIGHT CHANCE OF DOZING
HOW LIKELY ARE YOU TO NOD OFF OR FALL ASLEEP WHILE LYING DOWN TO REST IN THE AFTERNOON WHEN CIRCUMSTANCES PERMIT: HIGH CHANCE OF DOZING
HOW LIKELY ARE YOU TO NOD OFF OR FALL ASLEEP WHILE SITTING AND TALKING TO SOMEONE: WOULD NEVER DOZE

## 2025-07-15 NOTE — PROGRESS NOTES
0.06 % nasal spray, 2 sprays by Each Nostril route 4 times daily, Disp: 15 mL, Rfl: 3    metOLazone (ZAROXOLYN) 5 MG tablet, TAKE 1 TABLET BY MOUTH EVERY DAY, Disp: 90 tablet, Rfl: 3    clindamycin (CLEOCIN T) 1 % lotion, APPLY TO AFFECTED AREA TWICE A DAY, Disp: 60 mL, Rfl: 2    metaxalone (SKELAXIN) 800 MG tablet, TAKE 1 TABLET BY MOUTH THREE TIMES A DAY, Disp: 90 tablet, Rfl: 4    valACYclovir (VALTREX) 500 MG tablet, Take 1 tablet by mouth daily, Disp: 90 tablet, Rfl: 5    clobetasol propionate 0.05 % LOTN lotion, Apply thin film twice a day as needed to legs, Disp: 118 mL, Rfl: 3    propranolol (INDERAL LA) 120 MG extended release capsule, Take 1 capsule by mouth at bedtime, Disp: 90 capsule, Rfl: 3    busPIRone (BUSPAR) 7.5 MG tablet, Take 1 tablet by mouth 2 times daily, Disp: 180 tablet, Rfl: 5    ibuprofen (ADVIL;MOTRIN) 800 MG tablet, TAKE 1 TABLET BY MOUTH 3 TIMES  DAILY WITH MEALS, Disp: 270 tablet, Rfl: 3    levothyroxine (SYNTHROID) 200 MCG tablet, Take 1 tablet by mouth Daily, Disp: 90 tablet, Rfl: 3    omeprazole (PRILOSEC) 40 MG delayed release capsule, Take 1 capsule by mouth daily, Disp: 90 capsule, Rfl: 3    traZODone (DESYREL) 50 MG tablet, TAKE 2 TABLETS BY MOUTH AT NIGHT, Disp: 60 tablet, Rfl: 5    pregabalin (LYRICA) 150 MG capsule, Take 1 capsule by mouth in the morning, at noon, and at bedtime., Disp: , Rfl:     azelastine (ASTELIN) 0.1 % nasal spray, 2 sprays by Nasal route 2 times daily Use in each nostril as directed, Disp: 120 mL, Rfl: 3    tadalafil (CIALIS) 5 MG tablet, Take 1 tablet by mouth daily, Disp: 30 tablet, Rfl: 4    hyoscyamine (LEVSIN) 125 MCG tablet, Take 1 tablet by mouth every 4 hours as needed (IBS symptoms), Disp: 120 tablet, Rfl: 3        7/15/2025     7:29 AM 12/9/2024     7:34 AM 7/9/2024     7:04 AM 4/16/2024     7:07 AM   Sleep Medicine   Sitting and reading 2 1 2 1   Watching TV 1 1 1 2   Sitting, inactive in a public place (e.g. a theatre or a meeting) 1 0 0 2

## 2025-07-17 DIAGNOSIS — K51.90 ULCERATIVE COLITIS WITHOUT COMPLICATIONS, UNSPECIFIED LOCATION (HCC): ICD-10-CM

## 2025-07-17 RX ORDER — LACTULOSE 10 G/15ML
10 SOLUTION ORAL EVERY EVENING
Qty: 237 ML | Refills: 4 | Status: SHIPPED | OUTPATIENT
Start: 2025-07-17

## 2025-08-08 DIAGNOSIS — L20.82 FLEXURAL ECZEMA: ICD-10-CM

## 2025-08-08 RX ORDER — CLOBETASOL PROPIONATE 0.5 MG/ML
LOTION TOPICAL
Qty: 118 ML | Refills: 3 | Status: SHIPPED | OUTPATIENT
Start: 2025-08-08

## 2025-08-12 ENCOUNTER — TELEPHONE (OUTPATIENT)
Dept: SLEEP CENTER | Age: 55
End: 2025-08-12

## 2025-08-13 DIAGNOSIS — G47.33 OBSTRUCTIVE SLEEP APNEA: Primary | ICD-10-CM

## 2025-08-15 DIAGNOSIS — G25.0 ESSENTIAL TREMOR: ICD-10-CM

## 2025-08-20 RX ORDER — DICLOFENAC SODIUM 75 MG/1
75 TABLET, DELAYED RELEASE ORAL 2 TIMES DAILY
Qty: 180 TABLET | Refills: 2 | Status: SHIPPED | OUTPATIENT
Start: 2025-08-20

## 2025-08-20 RX ORDER — VALACYCLOVIR HYDROCHLORIDE 500 MG/1
500 TABLET, FILM COATED ORAL DAILY
Qty: 90 TABLET | Refills: 3 | Status: SHIPPED | OUTPATIENT
Start: 2025-08-20

## 2025-08-20 RX ORDER — PROPRANOLOL HYDROCHLORIDE 120 MG/1
120 CAPSULE, EXTENDED RELEASE ORAL DAILY
Qty: 90 CAPSULE | Refills: 3 | Status: SHIPPED | OUTPATIENT
Start: 2025-08-20

## 2025-08-25 DIAGNOSIS — H69.91 EUSTACHIAN TUBE DISORDER, RIGHT: ICD-10-CM

## 2025-08-25 RX ORDER — CLINDAMYCIN PHOSPHATE 10 UG/ML
LOTION TOPICAL 2 TIMES DAILY
Qty: 60 ML | Refills: 2 | Status: SHIPPED | OUTPATIENT
Start: 2025-08-25

## 2025-08-25 RX ORDER — IPRATROPIUM BROMIDE 42 UG/1
SPRAY, METERED NASAL
Qty: 1 EACH | Refills: 4 | Status: SHIPPED | OUTPATIENT
Start: 2025-08-25

## (undated) DEVICE — 6 ML SYRINGE LUER-LOCK TIP: Brand: MONOJECT

## (undated) DEVICE — Device: Brand: PORTEX

## (undated) DEVICE — TIP COVER ACCESSORY

## (undated) DEVICE — SUTURE V-LOC 180 SZ 0 L9IN ABSRB GRN GS-21 L37MM 1/2 CIR VLOCL0346

## (undated) DEVICE — BANDAGE ADH W1XL3IN NAT FAB WVN FLX DURABLE N ADH PD SEAL

## (undated) DEVICE — GOWN,SIRUS,NONRNF,SETINSLV,XL,20/CS: Brand: MEDLINE

## (undated) DEVICE — NEEDLE HYPO 25GA L1.5IN BLU POLYPR HUB S STL REG BVL STR

## (undated) DEVICE — PACK SURG LAP CHOLE CUSTOM

## (undated) DEVICE — NON-DEHP CATHETER EXTENSION SET, MALE LUER LOCK ADAPTER

## (undated) DEVICE — CANNULA SEAL

## (undated) DEVICE — 34" SINGLE PATIENT USE HOVERMATT BREATHABLE: Brand: SINGLE PATIENT USE HOVERMATT

## (undated) DEVICE — SUTURE DEV SZ 0 L6IN N ABSORBABLE

## (undated) DEVICE — 3M™ RED DOT™ MONITORING ELECTRODE WITH FOAM TAPE AND STICKY GEL 2560, 50/BAG, 20/CASE, 72/PLT: Brand: RED DOT™

## (undated) DEVICE — SCISSORS SURG DIA8MM MPLR CRV ENDOWRIST

## (undated) DEVICE — GLOVE ORANGE PI 7 1/2   MSG9075

## (undated) DEVICE — GARMENT,MEDLINE,DVT,INT,CALF,MED, GEN2: Brand: MEDLINE

## (undated) DEVICE — ELECTRODE PT RET AD L9FT HI MOIST COND ADH HYDRGEL CORDED

## (undated) DEVICE — COLUMN DRAPE

## (undated) DEVICE — SYRINGE MED 10ML TRNSLUC BRL PLUNG BLK MRK POLYPR CTRL

## (undated) DEVICE — WARMER SCP LAP

## (undated) DEVICE — SYRINGE, LUER LOCK, 5ML: Brand: MEDLINE

## (undated) DEVICE — 12 ML SYRINGE,LUER-LOCK TIP: Brand: MONOJECT

## (undated) DEVICE — ARM DRAPE

## (undated) DEVICE — MARKER,SKIN,WI/RULER AND LABELS: Brand: MEDLINE

## (undated) DEVICE — COVER,LIGHT HANDLE,FLX,1/PK: Brand: MEDLINE INDUSTRIES, INC.

## (undated) DEVICE — DOUBLE BASIN SET: Brand: MEDLINE INDUSTRIES, INC.

## (undated) DEVICE — SET INST DAVINCI XI ACCESSORIES

## (undated) DEVICE — PROGRASP FORCEPS: Brand: ENDOWRIST

## (undated) DEVICE — SET MAJOR INSTR HOUSE

## (undated) DEVICE — SUTURE ABSRB L6IN L37MM 0 GS-21 GRN 1/2 CIR TAPR PNT NDL VLOCL0306

## (undated) DEVICE — NEEDLE HYPO 18GA L1.5IN PNK POLYPR HUB S STL REG BVL STR

## (undated) DEVICE — SCOPE DAVINCI XI 30 DEG W/CORD

## (undated) DEVICE — GAUZE,SPONGE,4"X4",8PLY,STRL,LF,10/TRAY: Brand: MEDLINE

## (undated) DEVICE — SHEET DRAPE FULL 70X100

## (undated) DEVICE — ELECTRO LUBE IS A SINGLE PATIENT USE DEVICE THAT IS INTENDED TO BE USED ON ELECTROSURGICAL ELECTRODES TO REDUCE STICKING.: Brand: KEY SURGICAL ELECTRO LUBE

## (undated) DEVICE — APPLICATOR MEDICATED 26 CC SOLUTION HI LT ORNG CHLORAPREP

## (undated) DEVICE — INSUFFLATION NEEDLE TO ESTABLISH PNEUMOPERITONEUM.: Brand: INSUFFLATION NEEDLE

## (undated) DEVICE — MEGA SUTURECUT ND: Brand: ENDOWRIST

## (undated) DEVICE — MICRO TIP WIPE: Brand: DEVON